# Patient Record
Sex: FEMALE | Race: WHITE | Employment: OTHER | URBAN - METROPOLITAN AREA
[De-identification: names, ages, dates, MRNs, and addresses within clinical notes are randomized per-mention and may not be internally consistent; named-entity substitution may affect disease eponyms.]

---

## 2019-10-15 ENCOUNTER — TELEPHONE (OUTPATIENT)
Dept: PULMONOLOGY | Age: 84
End: 2019-10-15

## 2019-10-17 ENCOUNTER — OFFICE VISIT (OUTPATIENT)
Dept: PULMONOLOGY | Age: 84
End: 2019-10-17
Payer: MEDICARE

## 2019-10-17 VITALS
OXYGEN SATURATION: 97 % | HEART RATE: 68 BPM | HEIGHT: 66 IN | DIASTOLIC BLOOD PRESSURE: 78 MMHG | WEIGHT: 173.5 LBS | SYSTOLIC BLOOD PRESSURE: 136 MMHG | BODY MASS INDEX: 27.88 KG/M2

## 2019-10-17 DIAGNOSIS — D68.9 COAGULATION DEFECT (HCC): ICD-10-CM

## 2019-10-17 DIAGNOSIS — R91.8 LUNG MASS: Primary | ICD-10-CM

## 2019-10-17 PROCEDURE — G8427 DOCREV CUR MEDS BY ELIG CLIN: HCPCS | Performed by: INTERNAL MEDICINE

## 2019-10-17 PROCEDURE — G8400 PT W/DXA NO RESULTS DOC: HCPCS | Performed by: INTERNAL MEDICINE

## 2019-10-17 PROCEDURE — 4040F PNEUMOC VAC/ADMIN/RCVD: CPT | Performed by: INTERNAL MEDICINE

## 2019-10-17 PROCEDURE — G8598 ASA/ANTIPLAT THER USED: HCPCS | Performed by: INTERNAL MEDICINE

## 2019-10-17 PROCEDURE — 1036F TOBACCO NON-USER: CPT | Performed by: INTERNAL MEDICINE

## 2019-10-17 PROCEDURE — G8484 FLU IMMUNIZE NO ADMIN: HCPCS | Performed by: INTERNAL MEDICINE

## 2019-10-17 PROCEDURE — 1123F ACP DISCUSS/DSCN MKR DOCD: CPT | Performed by: INTERNAL MEDICINE

## 2019-10-17 PROCEDURE — 99204 OFFICE O/P NEW MOD 45 MIN: CPT | Performed by: INTERNAL MEDICINE

## 2019-10-17 PROCEDURE — G8417 CALC BMI ABV UP PARAM F/U: HCPCS | Performed by: INTERNAL MEDICINE

## 2019-10-17 PROCEDURE — 1090F PRES/ABSN URINE INCON ASSESS: CPT | Performed by: INTERNAL MEDICINE

## 2019-10-22 ENCOUNTER — TELEPHONE (OUTPATIENT)
Dept: PULMONOLOGY | Age: 84
End: 2019-10-22

## 2019-10-23 ENCOUNTER — HOSPITAL ENCOUNTER (OUTPATIENT)
Dept: GENERAL RADIOLOGY | Age: 84
Discharge: HOME | End: 2019-10-23
Payer: MEDICARE

## 2019-10-23 ENCOUNTER — HOSPITAL ENCOUNTER (OUTPATIENT)
Dept: CT IMAGING | Age: 84
Discharge: HOME | End: 2019-10-23
Payer: MEDICARE

## 2019-10-23 VITALS
SYSTOLIC BLOOD PRESSURE: 139 MMHG | DIASTOLIC BLOOD PRESSURE: 70 MMHG | OXYGEN SATURATION: 96 % | TEMPERATURE: 98.4 F | HEART RATE: 65 BPM | RESPIRATION RATE: 18 BRPM

## 2019-10-23 DIAGNOSIS — R91.8 LUNG MASS: ICD-10-CM

## 2019-10-23 LAB
APTT: 30 SEC (ref 26–36)
INR BLD: 0.99 (ref 0.86–1.14)
PLATELET # BLD: 230 K/UL (ref 135–450)
PROTHROMBIN TIME: 11.3 SEC (ref 9.8–13)

## 2019-10-23 PROCEDURE — 71045 X-RAY EXAM CHEST 1 VIEW: CPT

## 2019-10-23 PROCEDURE — 85049 AUTOMATED PLATELET COUNT: CPT

## 2019-10-23 PROCEDURE — 7100000011 HC PHASE II RECOVERY - ADDTL 15 MIN

## 2019-10-23 PROCEDURE — 36415 COLL VENOUS BLD VENIPUNCTURE: CPT

## 2019-10-23 PROCEDURE — 7100000010 HC PHASE II RECOVERY - FIRST 15 MIN

## 2019-10-23 PROCEDURE — 88342 IMHCHEM/IMCYTCHM 1ST ANTB: CPT

## 2019-10-23 PROCEDURE — 85730 THROMBOPLASTIN TIME PARTIAL: CPT

## 2019-10-23 PROCEDURE — 6360000002 HC RX W HCPCS: Performed by: RADIOLOGY

## 2019-10-23 PROCEDURE — 88305 TISSUE EXAM BY PATHOLOGIST: CPT

## 2019-10-23 PROCEDURE — 2709999900 CT NEEDLE BIOPSY LUNG PERCUTANEOUS

## 2019-10-23 PROCEDURE — 88341 IMHCHEM/IMCYTCHM EA ADD ANTB: CPT

## 2019-10-23 PROCEDURE — 85610 PROTHROMBIN TIME: CPT

## 2019-10-23 RX ORDER — MIDAZOLAM HYDROCHLORIDE 1 MG/ML
0.5 INJECTION INTRAMUSCULAR; INTRAVENOUS ONCE
Status: COMPLETED | OUTPATIENT
Start: 2019-10-23 | End: 2019-10-23

## 2019-10-23 RX ORDER — FENTANYL CITRATE 50 UG/ML
25 INJECTION, SOLUTION INTRAMUSCULAR; INTRAVENOUS ONCE
Status: COMPLETED | OUTPATIENT
Start: 2019-10-23 | End: 2019-10-23

## 2019-10-23 RX ADMIN — FENTANYL CITRATE 25 MCG: 50 INJECTION, SOLUTION INTRAMUSCULAR; INTRAVENOUS at 11:40

## 2019-10-23 RX ADMIN — MIDAZOLAM HYDROCHLORIDE 0.5 MG: 2 INJECTION, SOLUTION INTRAMUSCULAR; INTRAVENOUS at 11:40

## 2019-10-23 ASSESSMENT — PAIN SCALES - GENERAL
PAINLEVEL_OUTOF10: 0

## 2019-11-05 ENCOUNTER — OFFICE VISIT (OUTPATIENT)
Dept: PULMONOLOGY | Age: 84
End: 2019-11-05
Payer: MEDICARE

## 2019-11-05 VITALS
OXYGEN SATURATION: 97 % | HEART RATE: 63 BPM | WEIGHT: 172 LBS | DIASTOLIC BLOOD PRESSURE: 84 MMHG | SYSTOLIC BLOOD PRESSURE: 130 MMHG | RESPIRATION RATE: 16 BRPM | BODY MASS INDEX: 27.76 KG/M2

## 2019-11-05 DIAGNOSIS — C34.2 MALIGNANT NEOPLASM OF MIDDLE LOBE OF RIGHT LUNG (HCC): Primary | ICD-10-CM

## 2019-11-05 PROCEDURE — G8598 ASA/ANTIPLAT THER USED: HCPCS | Performed by: INTERNAL MEDICINE

## 2019-11-05 PROCEDURE — 99214 OFFICE O/P EST MOD 30 MIN: CPT | Performed by: INTERNAL MEDICINE

## 2019-11-05 PROCEDURE — G8427 DOCREV CUR MEDS BY ELIG CLIN: HCPCS | Performed by: INTERNAL MEDICINE

## 2019-11-05 PROCEDURE — 1036F TOBACCO NON-USER: CPT | Performed by: INTERNAL MEDICINE

## 2019-11-05 PROCEDURE — 4040F PNEUMOC VAC/ADMIN/RCVD: CPT | Performed by: INTERNAL MEDICINE

## 2019-11-05 PROCEDURE — 1123F ACP DISCUSS/DSCN MKR DOCD: CPT | Performed by: INTERNAL MEDICINE

## 2019-11-05 PROCEDURE — G8400 PT W/DXA NO RESULTS DOC: HCPCS | Performed by: INTERNAL MEDICINE

## 2019-11-05 PROCEDURE — G8417 CALC BMI ABV UP PARAM F/U: HCPCS | Performed by: INTERNAL MEDICINE

## 2019-11-05 PROCEDURE — 1090F PRES/ABSN URINE INCON ASSESS: CPT | Performed by: INTERNAL MEDICINE

## 2019-11-05 PROCEDURE — G8484 FLU IMMUNIZE NO ADMIN: HCPCS | Performed by: INTERNAL MEDICINE

## 2019-11-07 ENCOUNTER — TELEPHONE (OUTPATIENT)
Dept: PULMONOLOGY | Age: 84
End: 2019-11-07

## 2019-11-07 ENCOUNTER — TRANSCRIBE ORDERS (OUTPATIENT)
Dept: ADMINISTRATIVE | Facility: HOSPITAL | Age: 84
End: 2019-11-07

## 2019-11-07 DIAGNOSIS — C34.2 MALIGNANT NEOPLASM OF MIDDLE LOBE, BRONCHUS OR LUNG (HCC): Primary | ICD-10-CM

## 2019-11-18 ENCOUNTER — HOSPITAL ENCOUNTER (OUTPATIENT)
Dept: MRI IMAGING | Facility: HOSPITAL | Age: 84
Discharge: HOME | End: 2019-11-18

## 2019-11-18 ENCOUNTER — HOSPITAL ENCOUNTER (OUTPATIENT)
Dept: PULMONOLOGY | Facility: HOSPITAL | Age: 84
Discharge: HOME | End: 2019-11-18

## 2019-11-18 ENCOUNTER — HOSPITAL ENCOUNTER (OUTPATIENT)
Dept: PET IMAGING | Facility: HOSPITAL | Age: 84
Discharge: HOME | End: 2019-11-18

## 2019-11-18 ENCOUNTER — HOSPITAL ENCOUNTER (OUTPATIENT)
Dept: PET IMAGING | Facility: HOSPITAL | Age: 84
Discharge: HOME | End: 2019-11-18
Admitting: INTERNAL MEDICINE

## 2019-11-18 DIAGNOSIS — C34.2 MALIGNANT NEOPLASM OF MIDDLE LOBE, BRONCHUS OR LUNG (HCC): ICD-10-CM

## 2019-11-18 LAB — GLUCOSE BLDC GLUCOMTR-MCNC: 106 MG/DL (ref 70–130)

## 2019-11-18 PROCEDURE — 94729 DIFFUSING CAPACITY: CPT

## 2019-11-18 PROCEDURE — 94727 GAS DIL/WSHOT DETER LNG VOL: CPT | Performed by: INTERNAL MEDICINE

## 2019-11-18 PROCEDURE — 94729 DIFFUSING CAPACITY: CPT | Performed by: INTERNAL MEDICINE

## 2019-11-18 PROCEDURE — 94060 EVALUATION OF WHEEZING: CPT | Performed by: INTERNAL MEDICINE

## 2019-11-18 PROCEDURE — 94060 EVALUATION OF WHEEZING: CPT

## 2019-11-18 PROCEDURE — 0 FLUDEOXYGLUCOSE F18 SOLUTION: Performed by: INTERNAL MEDICINE

## 2019-11-18 PROCEDURE — A9552 F18 FDG: HCPCS | Performed by: INTERNAL MEDICINE

## 2019-11-18 PROCEDURE — 82962 GLUCOSE BLOOD TEST: CPT

## 2019-11-18 PROCEDURE — 94727 GAS DIL/WSHOT DETER LNG VOL: CPT

## 2019-11-18 PROCEDURE — 70551 MRI BRAIN STEM W/O DYE: CPT

## 2019-11-18 PROCEDURE — 78815 PET IMAGE W/CT SKULL-THIGH: CPT

## 2019-11-18 RX ADMIN — FLUDEOXYGLUCOSE F18 1 DOSE: 300 INJECTION INTRAVENOUS at 13:40

## 2019-11-19 DIAGNOSIS — C34.2 MALIGNANT NEOPLASM OF MIDDLE LOBE OF RIGHT LUNG (HCC): ICD-10-CM

## 2019-11-25 DIAGNOSIS — C34.2 MALIGNANT NEOPLASM OF MIDDLE LOBE OF RIGHT LUNG (HCC): ICD-10-CM

## 2019-12-01 PROBLEM — C34.2 PRIMARY CANCER OF RIGHT MIDDLE LOBE OF LUNG (HCC): Status: ACTIVE | Noted: 2019-12-01

## 2019-12-01 NOTE — PROGRESS NOTES
DATE OF CONSULTATION: 12/2/2019    REFERRING PHYSICIAN: Juvencio Piña MD    Dear Juvencio Campo MD  Thank you for asking for my medical advice on this patient. I saw her in the  [place] office on 12/2/2019    REASON FOR CONSULTATION: Right middle lobe adenocarcinoma     HISTORY OF PRESENT ILLNESS: The patient is a very pleasant 84 y.o.  female   who was in her usual state of health until October 2019.  Patient had chest x-ray done by her primary care provider since she did not have one long time given her history of smoking.  This revealed right middle lobe lung nodule this was followed by CT scan that confirmed 4.3 cm right middle lobe lung mass with patient has CT-guided biopsy done October 25, 2019 that revealed adenocarcinoma. MRI brain whole-body PET scan did not show any evidence of metastatic disease.  Patient was referred to me for further recommendations.    SUBJECTIVE: When I saw the patient today she is here with her son.  He is complaining of mild fatigue but she does have chronic osteoarthritis with degenerative disc disease.  She fell recently and fractured her left proximal humerus bone.  She was deemed to be a surgical candidate.  Denied any weight loss.  She quit smoking more than 30 years ago.    Review of Systems   Constitutional: Negative for activity change, appetite change, chills, fatigue, fever and unexpected weight change.   HENT: Negative for hearing loss, mouth sores, nosebleeds, sore throat and trouble swallowing.    Eyes: Negative for visual disturbance.   Respiratory: Negative for cough, chest tightness, shortness of breath and wheezing.    Cardiovascular: Negative for chest pain, palpitations and leg swelling.   Gastrointestinal: Negative for abdominal distention, abdominal pain, blood in stool, constipation, diarrhea, nausea, rectal pain and vomiting.   Endocrine: Negative for cold intolerance and heat intolerance.   Genitourinary: Negative for difficulty urinating,  "dysuria, frequency and urgency.   Musculoskeletal: Negative for arthralgias, back pain, gait problem, joint swelling and myalgias.   Skin: Negative for rash.   Neurological: Negative for dizziness, tremors, syncope, weakness, light-headedness, numbness and headaches.   Hematological: Negative for adenopathy. Does not bruise/bleed easily.   Psychiatric/Behavioral: Negative for confusion, sleep disturbance and suicidal ideas. The patient is not nervous/anxious.        History reviewed. No pertinent past medical history.    Social History     Socioeconomic History   • Marital status:      Spouse name: Not on file   • Number of children: Not on file   • Years of education: Not on file   • Highest education level: Not on file       History reviewed. No pertinent family history.    History reviewed. No pertinent surgical history.    No Known Allergies       Current Outpatient Medications:   •  atorvastatin (LIPITOR) 10 MG tablet, Take 10 mg by mouth., Disp: , Rfl:   •  citalopram (CeleXA) 20 MG tablet, Take 20 mg by mouth., Disp: , Rfl:   •  clopidogrel (PLAVIX) 75 MG tablet, Take 75 mg by mouth., Disp: , Rfl:   •  lisinopril (PRINIVIL,ZESTRIL) 2.5 MG tablet, Take 2.5 mg by mouth., Disp: , Rfl:   •  melatonin 5 MG tablet tablet, Take 5 mg by mouth At Night As Needed., Disp: , Rfl:   •  aspirin 81 MG tablet, Take 81 mg by mouth., Disp: , Rfl:   •  gabapentin (NEURONTIN) 100 MG capsule, Take 100 mg by mouth., Disp: , Rfl:   •  HM OMEGA-3-6-9 FATTY ACIDS capsule, Take 1,200 mg by mouth., Disp: , Rfl:   •  RABEprazole (ACIPHEX) 20 MG EC tablet, Take 20 mg by mouth., Disp: , Rfl:     PHYSICAL EXAMINATION:   /77   Pulse 66   Temp 97.7 °F (36.5 °C) (Temporal)   Resp 12   Ht 167.6 cm (66\")   Wt 80.3 kg (177 lb)   SpO2 98%   BMI 28.57 kg/m²    ECOG Performance Status: 1 - Symptomatic but completely ambulatory  General Appearance:  alert, cooperative, no apparent distress and appears stated age "   Neurologic/Psychiatric: A&O x 3, gait steady, appropriate affect, strength 5/5 in all muscle groups   HEENT:  Normocephalic, without obvious abnormality, mucous membranes moist   Neck: Supple, symmetrical, trachea midline, no adenopathy;  No thyromegaly, masses, or tenderness   Lungs:   Clear to auscultation bilaterally; respirations regular, even, and unlabored bilaterally   Heart:  Regular rate and rhythm, no murmurs appreciated   Abdomen:   Soft, non-tender, non-distended and no organomegaly   Lymph nodes: No cervical, supraclavicular, inguinal or axillary adenopathy noted   Extremities: Normal, atraumatic; no clubbing, cyanosis, or edema    Skin: No rashes, ulcers, or suspicious lesions noted       No visits with results within 2 Week(s) from this visit.   Latest known visit with results is:   Hospital Outpatient Visit on 11/18/2019   Component Date Value Ref Range Status   • Glucose 11/18/2019 106  70 - 130 mg/dL Final        Mri Brain Without Contrast    Result Date: 11/18/2019  Narrative: EXAMINATION: MRI BRAIN WO CONTRAST-  INDICATION: C34.2-Malignant neoplasm of middle lobe, bronchus or lung.  TECHNIQUE: Multiplanar MRI of the brain without intravenous contrast.  COMPARISON: None.  FINDINGS: No restriction on diffusion-weighted sequences. Midline structures are symmetric without evidence of mass, mass effect or midline shift, however, advanced overall moderate to severe increased signal findings on T2 and FLAIR imaging within the supratentorial white matter consistent with chronic small vessel ischemic disease. Pituitary and sella within normal limits. Cervicomedullary junction is widely patent. Globes and orbits retain normal T2 signal characteristics. Visualized paranasal sinuses and mastoid air cells are grossly clear and well pneumatized. No cerebellopontine angle mass lesion. Normal signal flow voids of the distal internal carotid and basilar arteries.      Impression: No acute intracranial  abnormality specifically no evidence for acute infarction with advanced chronic small vessel ischemic disease findings of increased signal in the periventricular and deep white matter of the supratentorial structures. Limited evaluation for intracranial metastasis given lack of intravenous contrast.   D:  11/18/2019 E:  11/18/2019  This report was finalized on 11/18/2019 4:30 PM by Dr. Stevie Beck.      Nm Pet Skull Base To Mid Thigh    Result Date: 11/18/2019  Narrative: EXAMINATION: NM PET, SKULL BASE TO MID THIGH-11/18/2019:  INDICATION: C34.2  MAL NEOPLASM RIGHT LUNG; C34.2-Malignant neoplasm of middle lobe, bronchus or lung, right lung cancer.  TECHNIQUE: With fasting blood glucose level of 106 mg/dL, a total of 12.36 mCi of FDG was administered via the right wrist. Following appropriate delay PET/CT imaging was obtained from the skull vertex to the mid thighs bilaterally and fused multiplanar images were reconstructed. The CT scan is an unenhanced study and used for reference to the PET scan only and should not be considered a diagnostic CT scan. PET/CT imaging was reviewed in the axial, coronal, and sagittal planes as well as within the cine mode.  The radiation dose reduction device was turned on as low as reasonably achievable for each scan per ALARA protocol.  COMPARISON:  Initial exam for treatment. No prior study is available for comparison.  FINDINGS: Normal variant activity identified within the oropharynx and muscles of phonation. Normal variant activity identified in the region of the vocal cords. Slight asymmetry seen with uptake of tracer identified within the right thyroid lobe. Several small calcifications correlating to this area of uptake of tracer. Correlation to ultrasound can be performed as indicated. Within the chest there is fracture seen of the humeral head and neck on the right with uptake of tracer identified to suggest fracture. There is a focus of hypermetabolic activity correlating  to a soft tissue mass seen within the right lower lobe. This mass has maximum SUV of 9.0 and is consistent with malignancy. No additional hypermetabolic activity identified within the chest.  The abdomen and pelvis reveals normal variant activity seen within the GI and  tract. No hypermetabolic activity identified to suggest evidence of metastatic disease. Some inflammatory change is identified within the bursa of the greater trochanter of the right femur.      Impression: Single focus of hypermetabolic activity correlating to malignancy within the right mid lung. No additional hypermetabolic focus to suggest evidence of metastatic disease. There is hypermetabolic activity correlating to the shoulder fracture on the left. Inflammatory changes seen within the greater trochanter of the right femur.  D:  11/18/2019 E:  11/18/2019     This report was finalized on 11/18/2019 3:48 PM by Dr. Jessica Melendez MD.          DIAGNOSTIC DATA:   1. Radiology: As above   2. Dr. Piña's note reviewed by me and documented in the  chart.   3. Pathology report: CT guided biopsy 10/25/19 showed adenocarcinooma  4. Laboratory data: Reviewed by me and documented in the patient's chart     ASSESSMENT: The patient is a very pleasant 84 y.o.  female  with right middle lobe lung cancer    PROBLEM LIST:   1. Right middle lobe lung adenocarcinoma, [id number] stage IIA:  A. Presented with abnormal chest x-ray  B. Ct chest showed 4.3 cm right middle lobe lung cancer.  C. Ct guided biopsy done 10/25/19 showed moderately differentiated adenocarcinoma  D.  MRI brain whole-body PET scan done on November 2019 did not show any evidence of metastatic disease  2.  Coronary artery disease  3.  Hypertension  4.  Peripheral neuropathy    PLAN:   1. I had a long discussion today with the patient and her son about her  new diagnosis of lung cancer. I did go over the final pathology report in  detail with both of them. I reviewed the patient's documents  including refereing provider's notes, lab results, imaging, and path report.   2.  I reviewed the patient PET scan films myself and I went over the pictures with the patient and her son.  3.  Given the patient age multiple comorbidities and not being able to tolerate left humerus fracture repair I really do not think she will be a good candidate for right thoracotomy.  I think patient should be treated with CyberKnife.  I explained to the patient that CyberKnife is acceptable alternative to surgery.  I do think patient need to have fiducials given the size of the mass.  4.  I will discuss the case at tumor board tomorrow morning.  5.  I will send molecular testing.  6.  The patient follow-up with me in 4 months with repeat whole-body PET scan.  7.  Future treatment options include immunotherapy or targeted treatment.  Andrzej Mirza MD  12/2/2019

## 2019-12-02 ENCOUNTER — TELEPHONE (OUTPATIENT)
Dept: ONCOLOGY | Facility: CLINIC | Age: 84
End: 2019-12-02

## 2019-12-02 ENCOUNTER — CONSULT (OUTPATIENT)
Dept: ONCOLOGY | Facility: CLINIC | Age: 84
End: 2019-12-02

## 2019-12-02 ENCOUNTER — OFFICE VISIT (OUTPATIENT)
Dept: ORTHOPEDIC SURGERY | Facility: CLINIC | Age: 84
End: 2019-12-02

## 2019-12-02 VITALS — WEIGHT: 173 LBS | OXYGEN SATURATION: 98 % | BODY MASS INDEX: 27.8 KG/M2 | HEART RATE: 72 BPM | HEIGHT: 66 IN

## 2019-12-02 VITALS
SYSTOLIC BLOOD PRESSURE: 157 MMHG | HEIGHT: 66 IN | DIASTOLIC BLOOD PRESSURE: 77 MMHG | WEIGHT: 177 LBS | BODY MASS INDEX: 28.45 KG/M2 | RESPIRATION RATE: 12 BRPM | TEMPERATURE: 97.7 F | HEART RATE: 66 BPM | OXYGEN SATURATION: 98 %

## 2019-12-02 DIAGNOSIS — C34.2 PRIMARY CANCER OF RIGHT MIDDLE LOBE OF LUNG (HCC): Primary | ICD-10-CM

## 2019-12-02 DIAGNOSIS — S42.292A OTHER CLOSED DISPLACED FRACTURE OF PROXIMAL END OF LEFT HUMERUS, INITIAL ENCOUNTER: Primary | ICD-10-CM

## 2019-12-02 PROCEDURE — 99204 OFFICE O/P NEW MOD 45 MIN: CPT | Performed by: INTERNAL MEDICINE

## 2019-12-02 PROCEDURE — 99203 OFFICE O/P NEW LOW 30 MIN: CPT | Performed by: ORTHOPAEDIC SURGERY

## 2019-12-02 RX ORDER — CHOLECALCIFEROL (VITAMIN D3) 50 MCG
1200 CAPSULE ORAL
COMMUNITY

## 2019-12-02 RX ORDER — CITALOPRAM 20 MG/1
20 TABLET ORAL
COMMUNITY
Start: 2017-07-27

## 2019-12-02 RX ORDER — LISINOPRIL 2.5 MG/1
2.5 TABLET ORAL
COMMUNITY
Start: 2017-06-12 | End: 2021-01-01

## 2019-12-02 RX ORDER — RABEPRAZOLE SODIUM 20 MG/1
20 TABLET, DELAYED RELEASE ORAL
COMMUNITY

## 2019-12-02 RX ORDER — CHOLECALCIFEROL (VITAMIN D3) 125 MCG
5 CAPSULE ORAL NIGHTLY PRN
COMMUNITY

## 2019-12-02 RX ORDER — ATORVASTATIN CALCIUM 10 MG/1
10 TABLET, FILM COATED ORAL
COMMUNITY
Start: 2015-09-24

## 2019-12-02 RX ORDER — GABAPENTIN 100 MG/1
100 CAPSULE ORAL
COMMUNITY
End: 2021-01-01

## 2019-12-02 RX ORDER — CLOPIDOGREL BISULFATE 75 MG/1
75 TABLET ORAL
COMMUNITY
Start: 2015-09-24 | End: 2022-01-01 | Stop reason: ALTCHOICE

## 2019-12-02 NOTE — PROGRESS NOTES
[facility] Note    Subjective     Chief Complaint   Patient presents with   • Left Shoulder - Pain     6 weeks ago, patient missed a step, falling and landed on left shoulder.         ADITHYA Alvarez is a 84 y.o. female.  Patient presents today for evaluation of left shoulder pain.  She injured the shoulder about 6 weeks ago, when she fell resulting in a proximal humerus fracture.  Fracture was a valgus impacted fracture, and was treated conservatively.  She has mild pain today, which is dull in quality, and 2 out of 10, and she has been in a sling.      Patient Active Problem List   Diagnosis   • Primary cancer of right middle lobe of lung (CMS/HCC)     No past medical history on file.   No past surgical history on file.   No family history on file.  Social History     Socioeconomic History   • Marital status:      Spouse name: Not on file   • Number of children: Not on file   • Years of education: Not on file   • Highest education level: Not on file      Current Outpatient Medications on File Prior to Visit   Medication Sig Dispense Refill   • aspirin 81 MG tablet Take 81 mg by mouth.     • atorvastatin (LIPITOR) 10 MG tablet Take 10 mg by mouth.     • citalopram (CeleXA) 20 MG tablet Take 20 mg by mouth.     • clopidogrel (PLAVIX) 75 MG tablet Take 75 mg by mouth.     • gabapentin (NEURONTIN) 100 MG capsule Take 100 mg by mouth.     • HM OMEGA-3-6-9 FATTY ACIDS capsule Take 1,200 mg by mouth.     • lisinopril (PRINIVIL,ZESTRIL) 2.5 MG tablet Take 2.5 mg by mouth.     • melatonin 5 MG tablet tablet Take 5 mg by mouth At Night As Needed.     • RABEprazole (ACIPHEX) 20 MG EC tablet Take 20 mg by mouth.       No current facility-administered medications on file prior to visit.       No Known Allergies     Review of Systems   Constitutional: Negative for activity change, appetite change, chills, diaphoresis, fatigue, fever and unexpected weight change.   HENT: Negative for  "congestion, dental problem, drooling, ear discharge, ear pain, facial swelling, hearing loss, mouth sores, nosebleeds, postnasal drip, rhinorrhea, sinus pressure, sneezing, sore throat, tinnitus, trouble swallowing and voice change.    Eyes: Negative for photophobia, pain, discharge, redness, itching and visual disturbance.   Respiratory: Negative for apnea, cough, choking, chest tightness, shortness of breath, wheezing and stridor.    Cardiovascular: Positive for leg swelling. Negative for chest pain and palpitations.   Gastrointestinal: Negative for abdominal distention, abdominal pain, anal bleeding, blood in stool, constipation, diarrhea, nausea, rectal pain and vomiting.   Endocrine: Negative for cold intolerance, heat intolerance, polydipsia, polyphagia and polyuria.   Genitourinary: Positive for frequency. Negative for decreased urine volume, difficulty urinating, dysuria, enuresis, flank pain, genital sores, hematuria and urgency.   Musculoskeletal: Positive for arthralgias. Negative for back pain, gait problem, joint swelling, myalgias, neck pain and neck stiffness.   Skin: Negative for color change, pallor, rash and wound.   Allergic/Immunologic: Negative for environmental allergies, food allergies and immunocompromised state.   Neurological: Positive for dizziness and headaches. Negative for tremors, seizures, syncope, facial asymmetry, speech difficulty, weakness, light-headedness and numbness.   Hematological: Negative for adenopathy. Does not bruise/bleed easily.   Psychiatric/Behavioral: Negative for agitation, behavioral problems, confusion, decreased concentration, dysphoric mood, hallucinations, self-injury, sleep disturbance and suicidal ideas. The patient is not nervous/anxious and is not hyperactive.         Objective      Physical Exam  Pulse 72   Ht 167.5 cm (65.95\")   Wt 78.5 kg (173 lb)   SpO2 98%   Breastfeeding? No   BMI 27.97 kg/m²     Body mass index is 27.97 " kg/m².    General:   Mental Status:  Alert   Appearance: Cooperative, in no acute distress   Build and Nutrition: Well-nourished well-developed female   Orientation: Alert and oriented to person, place and time   Posture: Normal    Neurologic:   Sensation:    Left hand: Intact to light touch in the digits   Motor:  Left upper extremity: Intact deltoid, biceps, triceps, wrist flexors, wrist extensors, and interossei  Vascular:   Left upper extremity: 2+ radial pulse, prompt capillary refill    Upper Extremities:   Left Shoulder:    Tenderness:  None    Swelling:  None    Crepitus: None    Atrophy:  None    Range of motion:  External rotation:  30°       Forward flexion:  80°       Abduction:   70°  Instability:  None  Deformities:  None      Imaging/Studies  Imaging Results (Last 24 Hours)     Procedure Component Value Units Date/Time    XR Shoulder 2+ View Left [[id number]] Resulted:  12/02/19 1420     Updated:  12/02/19 1421    Narrative:       Left Shoulder Radiographs  Indication: Left proximal humerus fracture, follow-up  Views: AP, outlet and axillary views of the left shoulder    Comparison: None available    Findings:  Healing proximal humerus fracture in acceptable alignment, with impaction,   and no evidence of joint dislocation.              Assessment and Plan     Antonette was seen today for pain.    Diagnoses and all orders for this visit:    Other closed displaced fracture of proximal end of left humerus, initial encounter  -     XR Shoulder 2+ View Left  -     Ambulatory Referral to Physical Therapy Evaluate and treat, Ortho; ROM        1. Other closed displaced fracture of proximal end of left humerus, initial encounter        I reviewed my findings with the patient and her son who accompanies her today.  Fracture appears to be healing, and is in acceptable position given her functional demands.  At this point, recommended physical therapy, and a referral was provided.  I will see her back in 6 weeks  with a repeat x-ray to ensure continued healing, but sooner for any problems.  Anticipated functional limitations were discussed today.    Return in about 6 weeks (around 1/13/2020) for Recheck with X-Rays.      Medical Decision Making  Management Options : physical/occupational therapy and close treatment of fracture or dislocation  Data/Risk: radiology tests and independent visualization of imaging, lab tests, or EMG/NCV      Franco Mujica MD  12/02/19  4:32 PM

## 2019-12-02 NOTE — TELEPHONE ENCOUNTER
----- Message from Georgielaura Kolb sent at 12/2/2019 12:21 PM EST -----  Regarding: [facility]  Contact: [phone]  [place] with [facility] called and patient's insurance will not cover the testing for this pathology so now it would be up to the patient to get other insurance or pay out of pocket. If you need to reach him back with any questions or from where to go from here number is above.

## 2019-12-02 NOTE — TELEPHONE ENCOUNTER
VM left for patient's son, Doron, advising that dr gallego can send a blood test (Tempus) that should be covered by insurance since the next gen sequencing was not performed by pathology with Dr Piña  To return call for more explanation if willing to perform this test

## 2019-12-04 ENCOUNTER — OFFICE VISIT (OUTPATIENT)
Dept: RADIATION ONCOLOGY | Facility: HOSPITAL | Age: 84
End: 2019-12-04

## 2019-12-04 ENCOUNTER — HOSPITAL ENCOUNTER (OUTPATIENT)
Dept: RADIATION ONCOLOGY | Facility: HOSPITAL | Age: 84
Setting detail: RADIATION/ONCOLOGY SERIES
Discharge: HOME | End: 2019-12-04

## 2019-12-04 VITALS
DIASTOLIC BLOOD PRESSURE: 75 MMHG | WEIGHT: 176.1 LBS | BODY MASS INDEX: 28.3 KG/M2 | TEMPERATURE: 98.4 F | OXYGEN SATURATION: 95 % | SYSTOLIC BLOOD PRESSURE: 157 MMHG | HEIGHT: 66 IN | HEART RATE: 55 BPM | RESPIRATION RATE: 16 BRPM

## 2019-12-04 DIAGNOSIS — C34.2 PRIMARY CANCER OF RIGHT MIDDLE LOBE OF LUNG (HCC): Primary | ICD-10-CM

## 2019-12-04 PROCEDURE — G0463 HOSPITAL OUTPT CLINIC VISIT: HCPCS

## 2019-12-04 NOTE — PROGRESS NOTES
CONSULTATION NOTE      :                                                          1935  DATE OF CONSULTATION:                       2019   REQUESTING PHYSICIAN:                   Andrzej Mirza MD  REASON FOR CONSULTATION:           Primary cancer of right middle lobe of lung (CMS/HCC)  Staging form: Lung, AJCC 8th Edition  - Clinical: Stage IIA (cT2b, cN0, cM0) - Signed by Andrzej Mirza MD on 2019         BRIEF HISTORY:  The patient is a very pleasant 84 y.o. female  with recently diagnosed lung cancer.  She presented with cough secondary to bronchitis.  She has a remote history of cigarette smoking discontinued 30 years ago.  She had no change in breathing otherwise and does not use supplemental oxygen.  No hemoptysis.  No reported weight loss or chest pain.  Radiographic imaging, however, revealed a solitary noncalcified spiculated 4.3 cm right middle lobe lung tumor.  CT-guided biopsy performed 10/23/2019 showed moderately differentiated adenocarcinoma.  Nuclear medicine PET/CT performed 2019 showed to the right lung tumor to be metabolically active, SUV 9, consistent with malignancy.  There was no hypermetabolic activity identified elsewhere in the chest.  There was some reactive/inflammatory change in the area of recent left humerus fracture.  MRI of the brain showed no evidence of metastatic disease.  She is not an operative candidate.  She was referred for definitive treatment with special consideration for stereotactic body radiotherapy.    No Known Allergies    Social History     Socioeconomic History   • Marital status:      Spouse name: Not on file   • Number of children: Not on file   • Years of education: Not on file   • Highest education level: Not on file   Tobacco Use   • Smoking status: Former Smoker     Last attempt to quit: 1980     Years since quittin.9   • Smokeless tobacco: Never Used   Substance and Sexual Activity   • Alcohol use: No     Frequency: Never  "  • Drug use: No   • Sexual activity: Defer       Past Medical History:   Diagnosis Date   • H/O multiple myeloma    • Lung cancer (CMS/HCC)    • Stroke (CMS/HCC) 2014       family history includes Cancer in her father; Melanoma in her brother; Stroke in her mother.     Past Surgical History:   Procedure Laterality Date   • LUNG BIOPSY     • MULTIPLE TOOTH EXTRACTIONS          Review of Systems   Constitutional: Positive for fatigue.   Eyes: Negative.    Respiratory: Positive for wheezing.    Cardiovascular: Positive for leg swelling.   Gastrointestinal: Negative.    Endocrine: Negative.    Genitourinary: Positive for bladder incontinence.    Musculoskeletal: Positive for arthralgias and back pain.   Skin: Negative.    Neurological: Positive for headaches.   Hematological: Bruises/bleeds easily.   Psychiatric/Behavioral: Negative.            Objective   VITAL SIGNS:   Vitals:    12/04/19 1508   BP: 157/75   Pulse: 55   Resp: 16   Temp: 98.4 °F (36.9 °C)   TempSrc: Temporal   SpO2: 95%  Comment: RA   Weight: 79.9 kg (176 lb 1.6 oz)   Height: 167.6 cm (66\")   PainSc:   4   PainLoc: Leg  Comment: left leg        Karnofsky score: 70        Physical Exam   Constitutional: She is oriented to person, place, and time. She appears well-developed and well-nourished.   HENT:   Head: Normocephalic and atraumatic.   Neck: Normal range of motion. Neck supple.   Cardiovascular: Normal rate, regular rhythm and normal heart sounds.   No murmur heard.  Pulmonary/Chest: Effort normal and breath sounds normal. She has no wheezes. She has no rales.   Abdominal: Soft. Bowel sounds are normal. She exhibits no distension. There is no hepatosplenomegaly. There is no tenderness.   Musculoskeletal: Normal range of motion. She exhibits edema ( 1+ right lower leg and 2+ left lower leg with some erythema.  No calf tenderness.) and deformity ( Left shoulder and right wrist.). She exhibits no tenderness.   Lymphadenopathy:     She has no cervical " adenopathy.     She has no axillary adenopathy.        Right: No supraclavicular adenopathy present.        Left: No supraclavicular adenopathy present.   Neurological: She is alert and oriented to person, place, and time. She has normal strength. No sensory deficit.   Skin: Skin is warm and dry.   Psychiatric: She has a normal mood and affect. Her behavior is normal. Judgment and thought content normal.   Nursing note and vitals reviewed.           The following portions of the patient's history were reviewed and updated as appropriate: allergies, current medications, past family history, past medical history, past social history, past surgical history and problem list.    Assessment      Non-small cell carcinoma, adenocarcinoma of the right middle lobe lung, clinical stage IIA (T2b, N0, M0).  She is medically inoperable but still a good candidate for definitive treatment.  Stereotactic body radiotherapy would offer an excellent chance of local tumor control with significantly reduced treatment related morbidity.  The CyberKnife treatment procedures been reviewed in detail with patient and son today.  Informed consent obtained.    RECOMMENDATIONS: She will return later this week for CT simulation.  The right middle lobe lung tumor will receive 4 fractions of 12 Gray each on the CyberKnife.  I do not believe she will require placement of gold seed fiducials.    Return in about 2 days (around 12/6/2019) for Simulation.  Antonette was seen today for lung cancer.    Diagnoses and all orders for this visit:    Primary cancer of right middle lobe of lung (CMS/HCC)         Juan A Briseno MD

## 2019-12-06 ENCOUNTER — HOSPITAL ENCOUNTER (OUTPATIENT)
Dept: RADIATION ONCOLOGY | Facility: HOSPITAL | Age: 84
Discharge: HOME | End: 2019-12-06

## 2019-12-06 ENCOUNTER — APPOINTMENT (OUTPATIENT)
Dept: LAB | Facility: HOSPITAL | Age: 84
End: 2019-12-06

## 2019-12-06 DIAGNOSIS — C34.2 PRIMARY CANCER OF RIGHT MIDDLE LOBE OF LUNG (HCC): Primary | ICD-10-CM

## 2019-12-06 PROCEDURE — 77280 THER RAD SIMULAJ FIELD SMPL: CPT | Performed by: RADIOLOGY

## 2019-12-10 ENCOUNTER — TREATMENT (OUTPATIENT)
Dept: PHYSICAL THERAPY | Facility: CLINIC | Age: 84
End: 2019-12-10

## 2019-12-10 ENCOUNTER — TELEPHONE (OUTPATIENT)
Dept: ONCOLOGY | Facility: CLINIC | Age: 84
End: 2019-12-10

## 2019-12-10 DIAGNOSIS — M25.612 POST-TRAUMATIC STIFFNESS OF LEFT SHOULDER JOINT: ICD-10-CM

## 2019-12-10 DIAGNOSIS — M25.512 CHRONIC LEFT SHOULDER PAIN: ICD-10-CM

## 2019-12-10 DIAGNOSIS — G89.29 CHRONIC LEFT SHOULDER PAIN: ICD-10-CM

## 2019-12-10 DIAGNOSIS — S42.295S OTHER CLOSED NONDISPLACED FRACTURE OF PROXIMAL END OF LEFT HUMERUS, SEQUELA: Primary | ICD-10-CM

## 2019-12-10 PROCEDURE — 77295 3-D RADIOTHERAPY PLAN: CPT | Performed by: RADIOLOGY

## 2019-12-10 PROCEDURE — 97162 PT EVAL MOD COMPLEX 30 MIN: CPT | Performed by: PHYSICAL THERAPIST

## 2019-12-10 PROCEDURE — 77300 RADIATION THERAPY DOSE PLAN: CPT | Performed by: RADIOLOGY

## 2019-12-10 PROCEDURE — 77334 RADIATION TREATMENT AID(S): CPT | Performed by: RADIOLOGY

## 2019-12-10 PROCEDURE — 97110 THERAPEUTIC EXERCISES: CPT | Performed by: PHYSICAL THERAPIST

## 2019-12-10 NOTE — TELEPHONE ENCOUNTER
[facility] called to let us know that they do not have enough tissue remaining to do the PDL1HC and DNA Mismatch repair panel and they have already sent it off, so they won't be able to do it. They will, however, send over the report when they get it back.

## 2019-12-10 NOTE — PROGRESS NOTES
Physical Therapy Initial Evaluation and Plan of Care        Patient: Antonette Alvarez   : 1935  Diagnosis/ICD-10 Code:  Other closed nondisplaced fracture of proximal end of left humerus, sequela [S42.295S]  Referring practitioner: Franco Mujica MD  Date of Initial Visit: 12/10/2019  Today's Date: 12/10/2019  Patient seen for 1 sessions           Subjective Questionnaire: QuickDASH: 56.82      Subjective Evaluation    History of Present Illness  Date of onset: 10/19/2019  Mechanism of injury: Patient was stepping down from the kitchen to the family room when she fell directly onto the shoulder and resulted in a fx.  She was placed in a sling and seems to be healing routinely.  She presents today out of the sling completely.  She also fell about 4-6 weeks prior to that and fx'd the right wrist that was handled conservatively as well.    Quality of life: good    Pain  Current pain ratin  At best pain ratin  At worst pain ratin  Quality: dull ache and tight  Relieving factors: support  Aggravating factors: repetitive movement and lifting (gripping)  Progression: no change    Social Support  Lives in: multiple-level home  Lives with: adult children    Diagnostic Tests  X-ray: abnormal (healing fx)  MRI studies: normal    Treatments  Previous treatment: immobilization  Current treatment: medication  Current treatment comments: OTC tylenol.     Patient Goals  Patient goals for therapy: decreased pain, increased motion and increased strength             Objective       Postural Observations    Additional Postural Observation Details  Fwd head, mild slumped sitting posture.    Observations     Additional Observation Details  atrophy around the left deltoid, no sling, generalized unwillingness to move the LUE.      Active Range of Motion   Left Shoulder   Flexion: 75 degrees   Abduction: 75 degrees   External rotation BTH: C4   Internal rotation BTB: L2     Right Shoulder   External rotation BTH: C7    Internal rotation BTB: sacrum (psoterolateral hip)     Passive Range of Motion   Left Shoulder   Flexion: 160 degrees   Abduction: 150 degrees   External rotation 0°: 35 degrees   Internal rotation 0°: WFL    Joint Play   Left Shoulder  Hypomobile in the anterior capsule, posterior capsule and inferior capsule.    Strength/Myotome Testing     Left Shoulder     Planes of Motion   Flexion: 2+   Abduction: 2+   External rotation at 0°: 3   Internal rotation at 0°: 4+     Isolated Muscles   Biceps: 5   Triceps: 5     Right Shoulder     Planes of Motion   Flexion: 4+   Abduction: 4   External rotation at 0°: 4+   Internal rotation at 0°: 4+     Isolated Muscles   Biceps: 5   Triceps: 5     Additional Strength Details  : Left 25, 25, 23  Right 25,20,22.  Position two.         Assessment & Plan     Assessment  Impairments: abnormal gait, abnormal or restricted ROM, activity intolerance, impaired balance, impaired physical strength, lacks appropriate home exercise program, pain with function and safety issue  Assessment details: Patient presents s/p left shoulder humeral fx but she is progressing well at this point, she demonstrate nearly 90 degrees of elevation with weakness in her RTC as expected.  She is undergoing concurrent cancer treatment for lung cancer and has a history of CVA.  She has recently moved in with her son and his family and has spent time less independent since the injury.  She also has a right wrist fx from a fall 4-6 weeks prior to the humeral fx, but she doesn't have an order for this currently.  This will likely improve with her exercises and time overall, but she may need further treatment for this as well with less improvements.    Prognosis: fair  Prognosis details: Barriers include advanced age, concurrent CA treatments, CVA history.  Functional Limitations: carrying objects, lifting, pulling, pushing, uncomfortable because of pain, moving in bed, reaching behind back and reaching  overhead  Goals  Plan Goals: Short Term Goals (4wks)  1. Patient to report right shoulder pain less than or equal to 1/10.  2. Patient to demonstrate at least 110 degrees of AROM left shoulder flexion  3. Patient will be independent and compliant with initial home exercise program.   4. Pt will report resting pain 0-1/10, 2-3/10 with PROM.  5. Pt will demonstrate 10-30° improvements in PFE, PER, PIR, and PABD.    Long Term Goals (6-8wks)  1. Patient to demonstrate at least 135 degrees of left shoulder flexion AROM.  2. Patient to demonstrate at least 135 degrees of left shoulder abduction AROM.  3. Patient to achieve left hand behind head to at least C7 without significant change in pain.  4. Pt will perform left  hand behind back to at least L3 to improve ability to tuck in shirt.  5. Pt. will be independent and compliant with advanced home exercise program to facilitate self-management of symptoms.  6. Patient will improve Quick Dash score to at least 30%.  7. Patient to resume independent cooking and cleaning tasks without limitation.      Plan  Therapy options: will be seen for skilled physical therapy services  Planned modality interventions: thermotherapy (hydrocollator packs) and cryotherapy  Planned therapy interventions: manual therapy, therapeutic activities, postural training, body mechanics training, functional ROM exercises, flexibility, gait training, stretching, strengthening, joint mobilization, neuromuscular re-education, soft tissue mobilization and spinal/joint mobilization  Frequency: 2x week  Duration in visits: 16  Treatment plan discussed with: patient  Plan details: Slow progression of ROM AA-A, scapular and shoulder stabilization, manual and modalities as indicated based on pain.        Timed:  Manual Therapy:    0     mins  49936;  Therapeutic Exercise:    10     mins  46148;     Neuromuscular Eduin:    0    mins  08163;    Therapeutic Activity:     0     mins  74386;     Gait Training:       0     mins  49852;     Ultrasound:     0     mins  94218;    Electrical Stimulation:    0     mins  07637 ( );    Untimed:  Electrical Stimulation:    0     mins  46910 ( );  Mechanical Traction:    0     mins  60265;     Timed Treatment:   10   mins   Total Treatment:     40   mins    PT SIGNATURE: Aaron Zarco, PT   DATE TREATMENT INITIATED: 12/10/2019    Medicare Initial Certification  Certification Period: 3/9/2020  I certify that the therapy services are furnished while this patient is under my care.  The services outlined above are required by this patient, and will be reviewed every 90 days.     PHYSICIAN: Franco Mujica MD      DATE:     Please sign and return via fax to [phone].. Thank you, [facility].

## 2019-12-10 NOTE — TELEPHONE ENCOUNTER
Spoke with [facility] pathology who states that they will have [employer] send back the block that was originally sent by the referring physician to have this testing done. It was not covered by patient's insurance, so the testing was never completed. They just received the request for tissue from our [facility] submission request, so they will have [employer] return the block so it can be submitted to [facility].

## 2019-12-10 NOTE — TELEPHONE ENCOUNTER
Update:  Insurance will not cover [facility] one testing.   [facility] never reached out to ask if she wants to be self-pay.     They want to make sure that we don't need anything else from [facility] then they will send it off to do PDL1HC and DNA Mismatch repair panel, and that's the only testing that will be done.     Please give them a call at

## 2019-12-17 ENCOUNTER — TREATMENT (OUTPATIENT)
Dept: PHYSICAL THERAPY | Facility: CLINIC | Age: 84
End: 2019-12-17

## 2019-12-17 DIAGNOSIS — M25.512 CHRONIC LEFT SHOULDER PAIN: ICD-10-CM

## 2019-12-17 DIAGNOSIS — M25.612 POST-TRAUMATIC STIFFNESS OF LEFT SHOULDER JOINT: ICD-10-CM

## 2019-12-17 DIAGNOSIS — G89.29 CHRONIC LEFT SHOULDER PAIN: ICD-10-CM

## 2019-12-17 DIAGNOSIS — S42.295S OTHER CLOSED NONDISPLACED FRACTURE OF PROXIMAL END OF LEFT HUMERUS, SEQUELA: Primary | ICD-10-CM

## 2019-12-17 PROCEDURE — 97110 THERAPEUTIC EXERCISES: CPT | Performed by: PHYSICAL THERAPIST

## 2019-12-17 PROCEDURE — A9300 EXERCISE EQUIPMENT: HCPCS | Performed by: PHYSICAL THERAPIST

## 2019-12-17 PROCEDURE — 97140 MANUAL THERAPY 1/> REGIONS: CPT | Performed by: PHYSICAL THERAPIST

## 2019-12-17 NOTE — PROGRESS NOTES
Physical Therapy Daily Progress Note        Patient: Antonette Alvarez   : 1935  Diagnosis/ICD-10 Code:  Other closed nondisplaced fracture of proximal end of left humerus, sequela [S42.295S]  Referring practitioner: Franco Mujica MD  Date of Initial Visit: Type: THERAPY  Noted: 12/10/2019  Today's Date: 2019  Patient seen for 2 sessions         Patient reports she has been working on her exercises and feels she is making improvements.  She is most limited with reaching behind her, but notices continued troubles lifting her pants after going to the bathroom. Her pain level is 3/10 upon arrival.      Objective   Passive ER arm at about 30 abd 45 with end range pain  Passive flexion to about 135.  Active flexion to 95    See Exercise, Manual, and Modality Logs for complete treatment.       Assessment & Plan     Assessment  Assessment details: Patient is improving well as expected and her ROM is well exceeding expectations at this point.  She is beginning cyberknife radiation treatments, which could impact her care potentially this week.  She is appropriate to begin light strengthening activities.    Plan  Plan details: Initiate light strengthening activities.            Timed:  Manual Therapy:    11     mins  94263;  Therapeutic Exercise:    20     mins  29363;     Neuromuscular Eduin:    0    mins  87732;    Therapeutic Activity:     0     mins  15939;     Gait Trainin     mins  60755;     Ultrasound:     0     mins  27149;    Electrical Stimulation:    0     mins  71670 ( );    Untimed:  Electrical Stimulation:    0     mins  34790 ( );  Mechanical Traction:    0     mins  96808;     Timed Treatment:   31   mins   Total Treatment:     31   mins  Aaron Zarco, PT  Physical Therapist

## 2019-12-18 ENCOUNTER — HOSPITAL ENCOUNTER (OUTPATIENT)
Dept: RADIATION ONCOLOGY | Facility: HOSPITAL | Age: 84
Discharge: HOME | End: 2019-12-18

## 2019-12-18 PROCEDURE — 77373 STRTCTC BDY RAD THER TX DLVR: CPT | Performed by: RADIOLOGY

## 2019-12-18 PROCEDURE — 77280 THER RAD SIMULAJ FIELD SMPL: CPT | Performed by: RADIOLOGY

## 2019-12-19 ENCOUNTER — HOSPITAL ENCOUNTER (OUTPATIENT)
Dept: RADIATION ONCOLOGY | Facility: HOSPITAL | Age: 84
Discharge: HOME | End: 2019-12-19

## 2019-12-19 PROCEDURE — 77373 STRTCTC BDY RAD THER TX DLVR: CPT | Performed by: RADIOLOGY

## 2019-12-20 ENCOUNTER — HOSPITAL ENCOUNTER (OUTPATIENT)
Dept: RADIATION ONCOLOGY | Facility: HOSPITAL | Age: 84
Discharge: HOME | End: 2019-12-20

## 2019-12-20 PROCEDURE — 77373 STRTCTC BDY RAD THER TX DLVR: CPT | Performed by: RADIOLOGY

## 2019-12-23 ENCOUNTER — HOSPITAL ENCOUNTER (OUTPATIENT)
Dept: RADIATION ONCOLOGY | Facility: HOSPITAL | Age: 84
Discharge: HOME | End: 2019-12-23

## 2019-12-23 PROCEDURE — 77336 RADIATION PHYSICS CONSULT: CPT | Performed by: RADIOLOGY

## 2019-12-23 PROCEDURE — 77373 STRTCTC BDY RAD THER TX DLVR: CPT | Performed by: RADIOLOGY

## 2019-12-27 ENCOUNTER — TREATMENT (OUTPATIENT)
Dept: PHYSICAL THERAPY | Facility: CLINIC | Age: 84
End: 2019-12-27

## 2019-12-27 DIAGNOSIS — S42.295S OTHER CLOSED NONDISPLACED FRACTURE OF PROXIMAL END OF LEFT HUMERUS, SEQUELA: Primary | ICD-10-CM

## 2019-12-27 DIAGNOSIS — M25.512 CHRONIC LEFT SHOULDER PAIN: ICD-10-CM

## 2019-12-27 DIAGNOSIS — M25.612 POST-TRAUMATIC STIFFNESS OF LEFT SHOULDER JOINT: ICD-10-CM

## 2019-12-27 DIAGNOSIS — G89.29 CHRONIC LEFT SHOULDER PAIN: ICD-10-CM

## 2019-12-27 PROCEDURE — 97110 THERAPEUTIC EXERCISES: CPT | Performed by: PHYSICAL THERAPIST

## 2019-12-27 PROCEDURE — 97140 MANUAL THERAPY 1/> REGIONS: CPT | Performed by: PHYSICAL THERAPIST

## 2019-12-27 NOTE — PROGRESS NOTES
Physical Therapy Daily Progress Note        Patient: Antonette Alvarez   : 1935  Diagnosis/ICD-10 Code:  Other closed nondisplaced fracture of proximal end of left humerus, sequela [S42.295S]  Referring practitioner: Franco Mujica MD  Date of Initial Visit: Type: THERAPY  Noted: 12/10/2019  Today's Date: 2019  Patient seen for 3 sessions         Patient reports that she has completed her radiation treatments and has had fatigue that has limited her participation in exercises a little at home.  She notes that her pain is along the superior shoulder and primarily with moving the arm.  She is, however, sleeping better on her right side. Her pain level is 2/10 upon arrival.          Objective   AROM elevation to 95 scapular plane.  PROM flex 135, abd 135, ER 55 at 45 ABD    See Exercise, Manual, and Modality Logs for complete treatment.       Assessment & Plan     Assessment  Assessment details: Patient demonstrate better than anticipated ROM since her surgery following humeral fx.  She is progressing through exercises and her pain is improving.  She is able to improve her comfort with sleeping.  She continues to have concurrent wrist pain that is unchanged.    Plan  Plan details: Add band RTC strengthening.              Timed:  Manual Therapy:    14     mins  07526;  Therapeutic Exercise:    17     mins  37338;     Neuromuscular Eduin:    0    mins  05746;    Therapeutic Activity:     0     mins  13546;     Gait Trainin     mins  11695;     Ultrasound:     0     mins  38224;    Electrical Stimulation:    0     mins  09675 ( );    Untimed:  Electrical Stimulation:    0     mins  24036 ( );  Mechanical Traction:    0     mins  24802;     Timed Treatment:   31   mins   Total Treatment:     31   mins  Aaron Zarco, PT  Physical Therapist

## 2020-01-07 ENCOUNTER — TREATMENT (OUTPATIENT)
Dept: PHYSICAL THERAPY | Facility: CLINIC | Age: 85
End: 2020-01-07

## 2020-01-07 DIAGNOSIS — M25.512 CHRONIC LEFT SHOULDER PAIN: ICD-10-CM

## 2020-01-07 DIAGNOSIS — M25.612 POST-TRAUMATIC STIFFNESS OF LEFT SHOULDER JOINT: ICD-10-CM

## 2020-01-07 DIAGNOSIS — S42.295S OTHER CLOSED NONDISPLACED FRACTURE OF PROXIMAL END OF LEFT HUMERUS, SEQUELA: Primary | ICD-10-CM

## 2020-01-07 DIAGNOSIS — G89.29 CHRONIC LEFT SHOULDER PAIN: ICD-10-CM

## 2020-01-07 PROCEDURE — 97110 THERAPEUTIC EXERCISES: CPT | Performed by: PHYSICAL THERAPIST

## 2020-01-07 NOTE — PROGRESS NOTES
Re-Assessment / Re-Certification        Patient: Antonette Alvarez   : 1935  Diagnosis/ICD-10 Code:  Other closed nondisplaced fracture of proximal end of left humerus, sequela [S42.295S]  Referring practitioner: Franco Mujica MD  Date of Initial Visit: Type: THERAPY  Noted: 12/10/2019  Today's Date: 2020  Patient seen for 4 sessions      Subjective:     Subjective Questionnaire: QuickDASH: 25%  Clinical Progress: improved  Home Program Compliance: Yes  Treatment has included: therapeutic exercise, manual therapy and therapeutic activity    Subjective Evaluation    History of Present Illness    Subjective comment: Patient notes a mild increase in her pain during exercise at times but it is temporary annd transient.     Objective       Postural Observations    Additional Postural Observation Details  Fwd head, mild slumped sitting posture.    Observations     Additional Observation Details  atrophy around the left deltoid, no sling    Active Range of Motion   Left Shoulder   Flexion: 122 (scapular plane) degrees with pain  External rotation BTH: C6   Internal rotation BTB: sacrum with pain    Right Shoulder   External rotation BTH: C7   Internal rotation BTB: L2     Passive Range of Motion   Left Shoulder   Flexion: 160 degrees   Abduction: 150 degrees   External rotation 0°: 50 degrees   Internal rotation 0°: WFL    Joint Play   Left Shoulder  Hypomobile in the anterior capsule, posterior capsule and inferior capsule.    Strength/Myotome Testing     Left Shoulder     Planes of Motion   Flexion: 2+   Abduction: 2+   External rotation at 0°: 3   Internal rotation at 0°: 4+     Isolated Muscles   Biceps: 5   Triceps: 5     Right Shoulder     Planes of Motion   Flexion: 4+   Abduction: 4   External rotation at 0°: 4+   Internal rotation at 0°: 4+     Isolated Muscles   Biceps: 5   Triceps: 5     Additional Strength Details  : Left 32,32,32 Right 25, 26, 25.  Position two     Assessment & Plan      Assessment  Impairments: abnormal gait, abnormal or restricted ROM, activity intolerance, impaired balance, impaired physical strength, lacks appropriate home exercise program, pain with function and safety issue  Assessment details: Reassessment performed in clinic today.  She has improved over 45 degrees in her elevation ability.  She is more comfortable with moving the arm but she has pain at end range elevation.  She still has significant RTC weakness as exepcted with her injury.  She still is having right wrist pain and  strength weakness from a second fracture. While she is improving and is exceeding expectations at this point, she will benefit from strengthening and stabilization at least through an HEP.  Prognosis: fair  Prognosis details: Barriers include advanced age, concurrent CA treatments, CVA history.  Functional Limitations: carrying objects, lifting, pulling, pushing, uncomfortable because of pain, moving in bed, reaching behind back and reaching overhead  Goals  Plan Goals: Short Term Goals (4wks)  1. Patient to report right shoulder pain less than or equal to 1/10.  2. Patient to demonstrate at least 110 degrees of AROM left shoulder flexion  3. Patient will be independent and compliant with initial home exercise program.   4. Pt will report resting pain 0-1/10, 2-3/10 with PROM.  5. Pt will demonstrate 10-30° improvements in PFE, PER, PIR, and PABD.    Long Term Goals (6-8wks)  1. Patient to demonstrate at least 135 degrees of left shoulder flexion AROM.  2. Patient to demonstrate at least 135 degrees of left shoulder abduction AROM.  3. Patient to achieve left hand behind head to at least C7 without significant change in pain.  4. Pt will perform left  hand behind back to at least L3 to improve ability to tuck in shirt.  5. Pt. will be independent and compliant with advanced home exercise program to facilitate self-management of symptoms.  6. Patient will improve Quick Dash score to at  least 30%.  7. Patient to resume independent cooking and cleaning tasks without limitation.      Plan  Therapy options: will be seen for skilled physical therapy services  Planned modality interventions: thermotherapy (hydrocollator packs) and cryotherapy  Planned therapy interventions: manual therapy, therapeutic activities, postural training, body mechanics training, functional ROM exercises, flexibility, gait training, stretching, strengthening, joint mobilization, neuromuscular re-education, soft tissue mobilization and spinal/joint mobilization  Frequency: 1x week  Duration in visits: 10  Treatment plan discussed with: patient  Plan details: Begin to progress shoulder strengthening and stabilization with scapular strengthening, manual and modalities as indicated.      Progress toward previous goals: Partially Met            Timeframe: 1 month  Prognosis to achieve goals: good    PT Signature: Aaron Zarco, PT      Based upon review of the patient's progress and continued therapy plan, it is my medical opinion that Antonette Alvarez should continue physical therapy treatment at [facility]  [address] [place] [id number] [phone].    Signature: __________________________________  Franco Mujica MD    Timed:  Manual Therapy:    0     mins  60968;  Therapeutic Exercise:    41     mins  84359;     Neuromuscular Eduin:    0    mins  50060;    Therapeutic Activity:     0     mins  67919;     Gait Trainin     mins  14243;     Ultrasound:     0     mins  35980;    Electrical Stimulation:    0     mins  45188 ( );    Untimed:  Electrical Stimulation:    0     mins  27876 ( );  Mechanical Traction:    0     mins  79122;     Timed Treatment:   41   mins   Total Treatment:     41   mins

## 2020-01-13 ENCOUNTER — OFFICE VISIT (OUTPATIENT)
Dept: ORTHOPEDIC SURGERY | Facility: CLINIC | Age: 85
End: 2020-01-13

## 2020-01-13 VITALS — OXYGEN SATURATION: 98 % | WEIGHT: 170 LBS | BODY MASS INDEX: 27.32 KG/M2 | HEIGHT: 66 IN | HEART RATE: 80 BPM

## 2020-01-13 DIAGNOSIS — S42.292D OTHER CLOSED DISPLACED FRACTURE OF PROXIMAL END OF LEFT HUMERUS WITH ROUTINE HEALING, SUBSEQUENT ENCOUNTER: ICD-10-CM

## 2020-01-13 DIAGNOSIS — Z09 FRACTURE FOLLOW-UP: Primary | ICD-10-CM

## 2020-01-13 PROCEDURE — 99212 OFFICE O/P EST SF 10 MIN: CPT | Performed by: ORTHOPAEDIC SURGERY

## 2020-01-13 NOTE — PROGRESS NOTES
[facility] [place] [place] [facility] Clinic Note    Subjective     Chief Complaint   Patient presents with   • Follow-up     6 week f/u; Other closed displaced fracture of proximal end of left humerus        ADITHYA Alvarez is a 84 y.o. female.  She follows up today for her left proximal humerus fracture.  She is doing well today, with only mild pain.  Improvement with physical therapy.      Patient Active Problem List   Diagnosis   • Primary cancer of right middle lobe of lung (CMS/HCC)     Past Medical History:   Diagnosis Date   • H/O multiple myeloma    • Lung cancer (CMS/HCC)    • Stroke (CMS/HCC)       Past Surgical History:   Procedure Laterality Date   • LUNG BIOPSY     • MULTIPLE TOOTH EXTRACTIONS        Family History   Problem Relation Age of Onset   • Stroke Mother    • Cancer Father    • Melanoma Brother      Social History     Socioeconomic History   • Marital status:      Spouse name: Not on file   • Number of children: Not on file   • Years of education: Not on file   • Highest education level: Not on file   Tobacco Use   • Smoking status: Former Smoker     Last attempt to quit: 1980     Years since quittin.0   • Smokeless tobacco: Never Used   Substance and Sexual Activity   • Alcohol use: No     Frequency: Never   • Drug use: No   • Sexual activity: Defer      Current Outpatient Medications on File Prior to Visit   Medication Sig Dispense Refill   • aspirin 81 MG tablet Take 81 mg by mouth.     • atorvastatin (LIPITOR) 10 MG tablet Take 10 mg by mouth.     • citalopram (CeleXA) 20 MG tablet Take 20 mg by mouth.     • clopidogrel (PLAVIX) 75 MG tablet Take 75 mg by mouth.     • gabapentin (NEURONTIN) 100 MG capsule Take 100 mg by mouth.     • HM OMEGA-3-6-9 FATTY ACIDS capsule Take 1,200 mg by mouth.     • lisinopril (PRINIVIL,ZESTRIL) 2.5 MG tablet Take 2.5 mg by mouth.     • melatonin 5 MG tablet tablet Take 5 mg by mouth At Night As Needed.     • RABEprazole (ACIPHEX) 20 MG EC  tablet Take 20 mg by mouth.       No current facility-administered medications on file prior to visit.       No Known Allergies     Review of Systems   Constitutional: Negative for activity change, appetite change, chills, diaphoresis, fatigue, fever and unexpected weight change.   HENT: Negative for congestion, dental problem, drooling, ear discharge, ear pain, facial swelling, hearing loss, mouth sores, nosebleeds, postnasal drip, rhinorrhea, sinus pressure, sneezing, sore throat, tinnitus, trouble swallowing and voice change.    Eyes: Negative for photophobia, pain, discharge, redness, itching and visual disturbance.   Respiratory: Negative for apnea, cough, choking, chest tightness, shortness of breath, wheezing and stridor.    Cardiovascular: Negative for chest pain, palpitations and leg swelling.   Gastrointestinal: Negative for abdominal distention, abdominal pain, anal bleeding, blood in stool, constipation, diarrhea, nausea, rectal pain and vomiting.   Endocrine: Negative for cold intolerance, heat intolerance, polydipsia, polyphagia and polyuria.   Genitourinary: Positive for frequency. Negative for decreased urine volume, difficulty urinating, dysuria, enuresis, flank pain, genital sores, hematuria and urgency.   Musculoskeletal: Positive for arthralgias. Negative for back pain, gait problem, joint swelling, myalgias, neck pain and neck stiffness.   Skin: Negative for color change, pallor, rash and wound.   Allergic/Immunologic: Negative for environmental allergies, food allergies and immunocompromised state.   Neurological: Positive for dizziness. Negative for tremors, seizures, syncope, facial asymmetry, speech difficulty, weakness, light-headedness, numbness and headaches.   Hematological: Negative for adenopathy. Does not bruise/bleed easily.   Psychiatric/Behavioral: Negative for agitation, behavioral problems, confusion, decreased concentration, dysphoric mood, hallucinations, self-injury, sleep  "disturbance and suicidal ideas. The patient is not nervous/anxious and is not hyperactive.         Objective      Physical Exam  Pulse 80   Ht 167.6 cm (65.98\")   Wt 77.1 kg (170 lb)   SpO2 98%   BMI 27.45 kg/m²     Body mass index is 27.45 kg/m².    General:   Mental Status:  Alert   Appearance: Cooperative, in no acute distress   Build and Nutrition: Well-nourished well-developed female   Orientation: Alert and oriented to person, place and time   Posture: Normal    Upper Extremities:   Left Shoulder:    Tenderness:  None    Swelling:  None    Range of motion:  External rotation:  40°       Forward flexion:  160°       Abduction:   150°  Deformities:  None      Imaging/Studies  Imaging Results (Last 24 Hours)     Procedure Component Value Units Date/Time    XR Shoulder 2+ View Left [[id number]] Resulted:  01/13/20 0931     Updated:  01/13/20 0932    Narrative:       Left Shoulder Radiographs  Indication: Follow-up left proximal humerus fracture  Views: AP, outlet and axillary views of the left shoulder    Comparison: 12/2/2019    Findings:  Fracture alignment is stable compared to the previous films, with good   signs of initial healing, with valgus impaction of the fracture, with no   unusual bony features.              Assessment and Plan     Antonette was seen today for follow-up.    Diagnoses and all orders for this visit:    Fracture follow-up  -     XR Shoulder 2+ View Left    Other closed displaced fracture of proximal end of left humerus with routine healing, subsequent encounter        1. Fracture follow-up    2. Other closed displaced fracture of proximal end of left humerus with routine healing, subsequent encounter        I reviewed my findings with the patient and her son.  Fracture is healed, and range of motion has improved.  She will finish out her physical therapy, and I will see her back if there are any problems in the future.  She seems to be functional with her motion, and can get her hand " behind her head and back.      Return if symptoms worsen or fail to improve.      Medical Decision Making  Management Options : close treatment of fracture or dislocation  Data/Risk: radiology tests and independent visualization of imaging, lab tests, or EMG/NCV      Franco Mujica MD  01/13/20  12:01 PM

## 2020-01-14 ENCOUNTER — TREATMENT (OUTPATIENT)
Dept: PHYSICAL THERAPY | Facility: CLINIC | Age: 85
End: 2020-01-14

## 2020-01-14 DIAGNOSIS — M25.612 POST-TRAUMATIC STIFFNESS OF LEFT SHOULDER JOINT: ICD-10-CM

## 2020-01-14 DIAGNOSIS — M25.512 CHRONIC LEFT SHOULDER PAIN: ICD-10-CM

## 2020-01-14 DIAGNOSIS — S42.295S OTHER CLOSED NONDISPLACED FRACTURE OF PROXIMAL END OF LEFT HUMERUS, SEQUELA: Primary | ICD-10-CM

## 2020-01-14 DIAGNOSIS — G89.29 CHRONIC LEFT SHOULDER PAIN: ICD-10-CM

## 2020-01-14 PROCEDURE — 97110 THERAPEUTIC EXERCISES: CPT | Performed by: PHYSICAL THERAPIST

## 2020-01-14 NOTE — PROGRESS NOTES
Physical Therapy Daily Progress Note        Patient: Antonette Alvarez   : 1935  Diagnosis/ICD-10 Code:  Other closed nondisplaced fracture of proximal end of left humerus, sequela [S42.295S]  Referring practitioner: Franco Mujica MD  Date of Initial Visit: Type: THERAPY  Noted: 12/10/2019  Today's Date: 2020  Patient seen for 5 sessions         Patient reports that she is doing great and feels she can continue PT independently at home.  She has been released by ortho.  She still has a little pain with lowering from an elevated position. Her pain level is 0-1/10 upon arrival.      Objective   Full PROM elevation.        See Exercise, Manual, and Modality Logs for complete treatment.       Assessment & Plan     Assessment  Assessment details: Patient is independent with her current program and appropriate to transition to [facility].    Plan  Plan details: Hold PT, follow up PRN over the next three weeks.              Timed:  Manual Therapy:    0     mins  88421;  Therapeutic Exercise:    30     mins  02137;     Neuromuscular Eduin:    0    mins  38330;    Therapeutic Activity:     0     mins  21810;     Gait Trainin     mins  74222;     Ultrasound:     0     mins  64986;    Electrical Stimulation:    0     mins  52017 ( );    Untimed:  Electrical Stimulation:    0     mins  61665 ( );  Mechanical Traction:    0     mins  74693;     Timed Treatment:   30   mins   Total Treatment:     30   mins  Aaron Zarco, PT  Physical Therapist

## 2020-01-21 ENCOUNTER — OFFICE VISIT (OUTPATIENT)
Dept: RADIATION ONCOLOGY | Facility: HOSPITAL | Age: 85
End: 2020-01-21

## 2020-01-21 ENCOUNTER — HOSPITAL ENCOUNTER (OUTPATIENT)
Dept: RADIATION ONCOLOGY | Facility: HOSPITAL | Age: 85
Setting detail: RADIATION/ONCOLOGY SERIES
Discharge: HOME | End: 2020-01-21

## 2020-01-21 VITALS
WEIGHT: 175.3 LBS | TEMPERATURE: 98.4 F | DIASTOLIC BLOOD PRESSURE: 70 MMHG | BODY MASS INDEX: 29.2 KG/M2 | OXYGEN SATURATION: 93 % | HEIGHT: 65 IN | HEART RATE: 63 BPM | SYSTOLIC BLOOD PRESSURE: 160 MMHG | RESPIRATION RATE: 16 BRPM

## 2020-01-21 DIAGNOSIS — C34.2 PRIMARY CANCER OF RIGHT MIDDLE LOBE OF LUNG (HCC): Primary | ICD-10-CM

## 2020-01-21 PROCEDURE — G0463 HOSPITAL OUTPT CLINIC VISIT: HCPCS

## 2020-01-21 NOTE — PROGRESS NOTES
FOLLOW UP NOTE    PATIENT:                                                      Antonette Alvarez  MEDICAL RECORD #:                        [id number]  :                                                          1935  COMPLETION DATE:    2019  DIAGNOSIS:     Primary cancer of right middle lobe of lung (CMS/HCC)  - Stage IIA (cT2b, cN0, cM0)      BRIEF HISTORY:  Ms. Alvarez returns for initial follow-up.  Remote history of multiple myeloma, in remission.  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  MRI of brain and whole body PET scan did not show evidence of metastatic disease.  She was not considered a good operative candidate.  She underwent definitive treatment with CyberKnife SBRT to the right lung mass with 48 Gy in 4 fractions.  She tolerated treatment well.  She did develop moderate post-treatment fatigue, now improving.  Denies hemoptysis, shortness of air, or chest pain.  She does have occasional cough with clear sputum.  She did not develop esophagitis symptoms and maintains a stable weight.  She continues to heal from a left humerus fracture status post fall.        MEDICATIONS: Medication reconciliation for the patient was reviewed and confirmed in the electronic medical record.    Review of Systems   Constitutional: Positive for fatigue.   Respiratory: Positive for cough. Negative for hemoptysis and shortness of breath.    Cardiovascular: Positive for leg swelling.   Musculoskeletal: Positive for arthralgias.   All other systems reviewed and are negative.      KPS 80%    Physical Exam   Constitutional: She is oriented to person, place, and time. She appears well-developed and well-nourished. No distress.   HENT:   Head: Normocephalic and atraumatic.   Eyes: Pupils are equal, round, and reactive to light. Conjunctivae are normal.   Neck: Normal range of motion. Neck supple.   Cardiovascular: Normal rate and regular rhythm.  "  Pulmonary/Chest: Effort normal and breath sounds normal. She has no wheezes. She exhibits no tenderness.   Musculoskeletal: She exhibits edema and tenderness.   RLE 1+ edema.  LLE 2+ edema.  Range of motion to LUE limited by pain.  Tender to palpation over left humeral head.   Lymphadenopathy:     She has no cervical adenopathy.        Right: No supraclavicular adenopathy present.        Left: No supraclavicular adenopathy present.   Neurological: She is alert and oriented to person, place, and time.   Skin: Skin is warm and dry.   Psychiatric: She has a normal mood and affect. Her behavior is normal. Judgment and thought content normal.   Nursing note and vitals reviewed.      VITAL SIGNS:   Vitals:    01/21/20 1330   BP: 160/70   Pulse: 63   Resp: 16   Temp: 98.4 °F (36.9 °C)   TempSrc: Temporal   SpO2: 93%   Weight: 79.5 kg (175 lb 4.8 oz)   Height: 165.1 cm (65\")   PainSc: 0-No pain       The following portions of the patient's history were reviewed and updated as appropriate: allergies, current medications, past family history, past medical history, past social history, past surgical history and problem list.       Antonette was seen today for primary cancer of right middle lobe of lung.    Diagnoses and all orders for this visit:    Primary cancer of right middle lobe of lung (CMS/HCC)      IMPRESSION:    History of medically inoperable, right middle lobe lung adenocarcinoma, clinical stage IIA (T2b, N0, M0).  She is 1 month status post definitive treatment with CyberKnife SBRT.  She tolerated treatment well.    No signs or symptoms of acute radiation-related toxicities.  PET scan has been previously ordered to determine tumor response to treatment.  We discussed follow-up intervals, surveillance imaging, and expectations for response to treatment.  She remains under the medical oncologic care of Dr. Mirza.      RECOMMENDATIONS:  Ms. Alvarez is scheduled for PET scan at the end of March, and will follow up with " Dr. Mirza on 4/7/2020.  I will try to coordinate 3 month follow-up same day in our office.    Return in about 11 weeks (around 4/7/2020) for Office Visit.    MEENA Falk

## 2020-03-24 ENCOUNTER — DOCUMENTATION (OUTPATIENT)
Dept: PHYSICAL THERAPY | Facility: CLINIC | Age: 85
End: 2020-03-24

## 2020-03-24 DIAGNOSIS — C34.2 PRIMARY CANCER OF RIGHT MIDDLE LOBE OF LUNG (HCC): Primary | ICD-10-CM

## 2020-03-24 NOTE — PROGRESS NOTES
Discharge Summary  Discharge Summary from Physical Therapy Report      Dates  PT visit: 12/10/2019-1/14/2020  Number of Visits: 5/7     Discharge Status of Patient: Patient was making really good progress, MD was agreeable to transition to [facility] and she did not require follow up after last visit.    Goals: Partially Met    Discharge Plan: Continue with current home exercise program as instructed      Date of Discharge 2/14/2020        Aaron Zarco, PT  Physical Therapist

## 2020-03-30 ENCOUNTER — TELEPHONE (OUTPATIENT)
Dept: ONCOLOGY | Facility: CLINIC | Age: 85
End: 2020-03-30

## 2020-03-30 NOTE — TELEPHONE ENCOUNTER
Spoke with Doron and rescheduled patient to 5/8/20 for visit and then transferred the call to central scheduling so he could r/s PET Scan.

## 2020-03-30 NOTE — TELEPHONE ENCOUNTER
CALLER- MEENU DAVIES (SON)    PT-REDD BLACK-DR. KIESHA CORRIGAN ASKED TO SPEAK WITH DR. HENDERSON OR A NURSE ABOUT POSTPONING THE PT'S 4/7/20 F/U APPT WITH DR. HENDERSON.     THE PT WAS SUPPOSED TO F/U EVERY 3 MONTHS BUT MAY RISK DECLINING HEALTH.    SHE JUST FINISHED TREATMENT WITH PLAN TO F/U.     MEENU IS ASKING IF IT'S POSSIBLE TO DELAY THE APPT TO 5/7/20 WITHOUT NEGATIVELY IMPACTING PT'S HEALTH.    MEENU'S PHONE #:    [phone])

## 2020-04-02 ENCOUNTER — APPOINTMENT (OUTPATIENT)
Dept: PET IMAGING | Facility: HOSPITAL | Age: 85
End: 2020-04-02

## 2020-05-04 ENCOUNTER — HOSPITAL ENCOUNTER (OUTPATIENT)
Dept: PET IMAGING | Facility: HOSPITAL | Age: 85
Discharge: HOME | End: 2020-05-04

## 2020-05-04 ENCOUNTER — HOSPITAL ENCOUNTER (OUTPATIENT)
Dept: PET IMAGING | Facility: HOSPITAL | Age: 85
Discharge: HOME | End: 2020-05-04
Admitting: INTERNAL MEDICINE

## 2020-05-04 DIAGNOSIS — C34.2 PRIMARY CANCER OF RIGHT MIDDLE LOBE OF LUNG (HCC): ICD-10-CM

## 2020-05-04 LAB — GLUCOSE BLDC GLUCOMTR-MCNC: 100 MG/DL (ref 70–130)

## 2020-05-04 PROCEDURE — 78815 PET IMAGE W/CT SKULL-THIGH: CPT

## 2020-05-04 PROCEDURE — A9552 F18 FDG: HCPCS | Performed by: INTERNAL MEDICINE

## 2020-05-04 PROCEDURE — 82962 GLUCOSE BLOOD TEST: CPT

## 2020-05-04 PROCEDURE — 0 FLUDEOXYGLUCOSE F18 SOLUTION: Performed by: INTERNAL MEDICINE

## 2020-05-04 RX ADMIN — FLUDEOXYGLUCOSE F18 1 DOSE: 300 INJECTION INTRAVENOUS at 10:52

## 2020-05-08 ENCOUNTER — TELEMEDICINE (OUTPATIENT)
Dept: ONCOLOGY | Facility: CLINIC | Age: 85
End: 2020-05-08

## 2020-05-08 DIAGNOSIS — C34.2 PRIMARY CANCER OF RIGHT MIDDLE LOBE OF LUNG (HCC): Primary | ICD-10-CM

## 2020-05-08 PROCEDURE — 99442 PR PHYS/QHP TELEPHONE EVALUATION 11-20 MIN: CPT | Performed by: INTERNAL MEDICINE

## 2020-05-08 NOTE — PROGRESS NOTES
DATE OF VISIT: 5/8/2020    REASON FOR VISIT: Followup for right middle lobe non-small cell lung cancer     HISTORY OF PRESENT ILLNESS: The patient is a very pleasant 84 y.o. female  with past medical history significant for right lung cancer.  Patient was treated with CyberKnife addition treatment January 2020. The patient is here today for a follow-up visit.    SUBJECTIVE: The patient has been doing fairly well. she was interviewed today using telemedicine given the current pandemic.  Her son was sitting next to her. she denied any fever or  chills, no night sweats, denied any headaches    PAST MEDICAL HISTORY/SOCIAL HISTORY/FAMILY HISTORY: Reviewed by me and unchanged from my documentation done on 05/08/20.    Review of Systems   Constitutional: Negative for activity change, appetite change, chills, fatigue, fever and unexpected weight change.   HENT: Negative for hearing loss, mouth sores, nosebleeds, sore throat and trouble swallowing.    Eyes: Negative for visual disturbance.   Respiratory: Negative for cough, chest tightness, shortness of breath and wheezing.    Cardiovascular: Negative for chest pain, palpitations and leg swelling.   Gastrointestinal: Negative for abdominal distention, abdominal pain, blood in stool, constipation, diarrhea, nausea, rectal pain and vomiting.   Endocrine: Negative for cold intolerance and heat intolerance.   Genitourinary: Negative for difficulty urinating, dysuria, frequency and urgency.   Musculoskeletal: Negative for arthralgias, back pain, gait problem, joint swelling and myalgias.   Skin: Negative for rash.   Neurological: Negative for dizziness, tremors, syncope, weakness, light-headedness, numbness and headaches.   Hematological: Negative for adenopathy. Does not bruise/bleed easily.   Psychiatric/Behavioral: Negative for confusion, sleep disturbance and suicidal ideas. The patient is not nervous/anxious.          Current Outpatient Medications:   •  aspirin 81 MG  tablet, Take 81 mg by mouth., Disp: , Rfl:   •  atorvastatin (LIPITOR) 10 MG tablet, Take 10 mg by mouth., Disp: , Rfl:   •  citalopram (CeleXA) 20 MG tablet, Take 20 mg by mouth., Disp: , Rfl:   •  clopidogrel (PLAVIX) 75 MG tablet, Take 75 mg by mouth., Disp: , Rfl:   •  gabapentin (NEURONTIN) 100 MG capsule, Take 100 mg by mouth., Disp: , Rfl:   •  HM OMEGA-3-6-9 FATTY ACIDS capsule, Take 1,200 mg by mouth., Disp: , Rfl:   •  lisinopril (PRINIVIL,ZESTRIL) 2.5 MG tablet, Take 2.5 mg by mouth., Disp: , Rfl:   •  melatonin 5 MG tablet tablet, Take 5 mg by mouth At Night As Needed., Disp: , Rfl:   •  RABEprazole (ACIPHEX) 20 MG EC tablet, Take 20 mg by mouth., Disp: , Rfl:     PHYSICAL EXAMINATION:   There were no vitals taken for this visit.   There were no vitals filed for this visit.    ECOG Performance Status: 1 - Symptomatic but completely ambulatory  General Appearance:  alert, cooperative, no apparent distress and appears stated age   Neurologic/Psychiatric: A&O x 3, gait steady, appropriate affect, strength 5/5 in all muscle groups   HEENT:     Neck:    Lungs:      Heart:     Abdomen:      Lymph nodes:    Extremities:    Skin:      Hospital Outpatient Visit on 05/04/2020   Component Date Value Ref Range Status   • Glucose 05/04/2020 100  70 - 130 mg/dL Final        Nm Pet Skull Base To Mid Thigh    Result Date: 5/5/2020  Narrative: EXAMINATION: NM PET SKULL BASE TO MID THIGH-  INDICATION: C34.2-Malignant neoplasm of middle lobe, bronchus or lung; followup lung cancer, multiple myeloma.  TECHNIQUE: With fasting blood glucose level of 100 mg/dL, a total of 12.08 mCi of FDG was administered via the right upper arm. Following appropriate delay, PET CT imaging was obtained from the skull vertex to the mid thighs bilaterally and fused multiplanar images were reconstructed. The CT scan is an unenhanced study and used for reference to the PET scan only and should not be considered a diagnostic CT scan. PET CT imaging  was reviewed in the axial, coronal, and sagittal planes as well as within the cine mode.  COMPARISON: Subsequent exam for treatment with prior study available for comparison dated 11/18/2019.  FINDINGS: There is normal variant activity identified within the oropharynx and muscles of phonation.  Normal variant activity identified in the region of the vocal cords. There is degenerative changes identified within the cervical spine. Uptake is seen in the paraspinal muscles. There is asymmetric uptake again seen of tracer within the right thyroid lobe. No hypermetabolic focus is identified within the neck to suggest evidence of malignancy or metastatic disease. There is significant decrease seen in size of the mass in the periphery of the right upper lobe. There is only minimal minimal residual markings identified on today's examination. No significant hypermetabolic activity associated with the residual opacification. Previously noted maximum SUV was 9.0. Today's maximum SUV in the residual area is 1.3 and falls well below the threshold level for malignancy. There is evidence of a tiny right pleural effusion. Cardiac activity is within normal limits. Within the abdomen and pelvis there is normal variant activity seen within the GI and  tract. No hypermetabolic focus to suggest evidence of malignancy or metastatic disease. Upper thighs are unremarkable.      Impression: 1. Stable activity identified in the right thyroid lobe. Previously seen activity in the mass in the right upper lobe and its periphery is resolved in the interval. There is minimal residual nodular density remaining that demonstrates no significant hypermetabolic activity. Remainder of the exam is grossly unremarkable. 2. Essentially negative PET CT scan.  D:  05/04/2020 E:  05/04/2020    This report was finalized on 5/5/2020 11:48 AM by Dr. Jessica Melendez MD.        ASSESSMENT: The patient is a very pleasant 84 y.o. female  with  right middle lobe  lung cancer    PROBLEM LIST:   1. Right middle lobe lung adenocarcinoma, [id number] stage IIA:  A. Presented with abnormal chest x-ray  B. Ct chest showed 4.3 cm right middle lobe lung cancer.  C. Ct guided biopsy done 10/25/19 showed moderately differentiated adenocarcinoma  D.  MRI brain whole-body PET scan done on November 2019 did not show any evidence of metastatic disease  2.  Coronary artery disease  3.  Hypertension  4.  Peripheral neuropathy  5. Multiple myeloma:  A.  Treated on clinical trial at the [facility] using regimen containing thalidomide.  B.  Has been remission for the last 10 years    PLAN:  1.  I did go over the PET scan results with the patient and her son Doron reassured there is no evidence of residual or recurrent disease.  2.  The patient follow-up with us in 6-month with another PET scan per  3.  We will repeat serum protein pheresis and return.    This visit has been rescheduled as a phone visit to comply with patient safety concerns in accordance with CDC recommendations. Total time of discussion was 15 minutes.      Andrzej Mirza MD  5/8/2020

## 2020-05-11 ENCOUNTER — HOSPITAL ENCOUNTER (OUTPATIENT)
Dept: RADIATION ONCOLOGY | Facility: HOSPITAL | Age: 85
Setting detail: RADIATION/ONCOLOGY SERIES
Discharge: HOME | End: 2020-05-11

## 2020-11-10 ENCOUNTER — LAB (OUTPATIENT)
Dept: LAB | Facility: HOSPITAL | Age: 85
End: 2020-11-10

## 2020-11-10 ENCOUNTER — HOSPITAL ENCOUNTER (OUTPATIENT)
Dept: PET IMAGING | Facility: HOSPITAL | Age: 85
Discharge: HOME | End: 2020-11-10

## 2020-11-10 DIAGNOSIS — C34.2 PRIMARY CANCER OF RIGHT MIDDLE LOBE OF LUNG (HCC): ICD-10-CM

## 2020-11-10 LAB
ALBUMIN SERPL-MCNC: 4.3 G/DL (ref 3.5–5.2)
ALBUMIN/GLOB SERPL: 2.2 G/DL
ALP SERPL-CCNC: 70 U/L (ref 39–117)
ALT SERPL W P-5'-P-CCNC: 14 U/L (ref 1–33)
ANION GAP SERPL CALCULATED.3IONS-SCNC: 10 MMOL/L (ref 5–15)
AST SERPL-CCNC: 20 U/L (ref 1–32)
BASOPHILS # BLD AUTO: 0.07 10*3/MM3 (ref 0–0.2)
BASOPHILS NFR BLD AUTO: 1.2 % (ref 0–1.5)
BILIRUB SERPL-MCNC: 0.5 MG/DL (ref 0–1.2)
BUN SERPL-MCNC: 14 MG/DL (ref 8–23)
BUN/CREAT SERPL: 20 (ref 7–25)
CALCIUM SPEC-SCNC: 9.7 MG/DL (ref 8.6–10.5)
CHLORIDE SERPL-SCNC: 104 MMOL/L (ref 98–107)
CO2 SERPL-SCNC: 28 MMOL/L (ref 22–29)
CREAT SERPL-MCNC: 0.7 MG/DL (ref 0.57–1)
DEPRECATED RDW RBC AUTO: 51.2 FL (ref 37–54)
EOSINOPHIL # BLD AUTO: 0.19 10*3/MM3 (ref 0–0.4)
EOSINOPHIL NFR BLD AUTO: 3.2 % (ref 0.3–6.2)
ERYTHROCYTE [DISTWIDTH] IN BLOOD BY AUTOMATED COUNT: 15.5 % (ref 12.3–15.4)
GFR SERPL CREATININE-BSD FRML MDRD: 80 ML/MIN/1.73
GLOBULIN UR ELPH-MCNC: 2 GM/DL
GLUCOSE BLDC GLUCOMTR-MCNC: 105 MG/DL (ref 70–130)
GLUCOSE SERPL-MCNC: 96 MG/DL (ref 65–99)
HCT VFR BLD AUTO: 35.7 % (ref 34–46.6)
HGB BLD-MCNC: 10.9 G/DL (ref 12–15.9)
IMM GRANULOCYTES # BLD AUTO: 0.02 10*3/MM3 (ref 0–0.05)
IMM GRANULOCYTES NFR BLD AUTO: 0.3 % (ref 0–0.5)
LYMPHOCYTES # BLD AUTO: 1.46 10*3/MM3 (ref 0.7–3.1)
LYMPHOCYTES NFR BLD AUTO: 24.9 % (ref 19.6–45.3)
MCH RBC QN AUTO: 27.2 PG (ref 26.6–33)
MCHC RBC AUTO-ENTMCNC: 30.5 G/DL (ref 31.5–35.7)
MCV RBC AUTO: 89 FL (ref 79–97)
MONOCYTES # BLD AUTO: 0.57 10*3/MM3 (ref 0.1–0.9)
MONOCYTES NFR BLD AUTO: 9.7 % (ref 5–12)
NEUTROPHILS NFR BLD AUTO: 3.55 10*3/MM3 (ref 1.7–7)
NEUTROPHILS NFR BLD AUTO: 60.7 % (ref 42.7–76)
NRBC BLD AUTO-RTO: 0 /100 WBC (ref 0–0.2)
PLATELET # BLD AUTO: 176 10*3/MM3 (ref 140–450)
PMV BLD AUTO: 12.3 FL (ref 6–12)
POTASSIUM SERPL-SCNC: 4.8 MMOL/L (ref 3.5–5.2)
PROT SERPL-MCNC: 6.3 G/DL (ref 6–8.5)
RBC # BLD AUTO: 4.01 10*6/MM3 (ref 3.77–5.28)
SODIUM SERPL-SCNC: 142 MMOL/L (ref 136–145)
WBC # BLD AUTO: 5.86 10*3/MM3 (ref 3.4–10.8)

## 2020-11-10 PROCEDURE — 80053 COMPREHEN METABOLIC PANEL: CPT

## 2020-11-10 PROCEDURE — 0 FLUDEOXYGLUCOSE F18 SOLUTION: Performed by: INTERNAL MEDICINE

## 2020-11-10 PROCEDURE — 85025 COMPLETE CBC W/AUTO DIFF WBC: CPT

## 2020-11-10 PROCEDURE — A9552 F18 FDG: HCPCS | Performed by: INTERNAL MEDICINE

## 2020-11-10 PROCEDURE — 84155 ASSAY OF PROTEIN SERUM: CPT

## 2020-11-10 PROCEDURE — 78815 PET IMAGE W/CT SKULL-THIGH: CPT

## 2020-11-10 PROCEDURE — 84165 PROTEIN E-PHORESIS SERUM: CPT

## 2020-11-10 PROCEDURE — 36415 COLL VENOUS BLD VENIPUNCTURE: CPT

## 2020-11-10 PROCEDURE — 82962 GLUCOSE BLOOD TEST: CPT

## 2020-11-10 RX ADMIN — FLUDEOXYGLUCOSE F18 1 DOSE: 300 INJECTION INTRAVENOUS at 10:25

## 2020-11-11 ENCOUNTER — TELEPHONE (OUTPATIENT)
Dept: ONCOLOGY | Facility: CLINIC | Age: 85
End: 2020-11-11

## 2020-11-11 LAB
ALBUMIN SERPL ELPH-MCNC: 3.8 G/DL (ref 2.9–4.4)
ALBUMIN/GLOB SERPL: 1.3 {RATIO} (ref 0.7–1.7)
ALPHA1 GLOB SERPL ELPH-MCNC: 0.3 G/DL (ref 0–0.4)
ALPHA2 GLOB SERPL ELPH-MCNC: 1 G/DL (ref 0.4–1)
B-GLOBULIN SERPL ELPH-MCNC: 1 G/DL (ref 0.7–1.3)
GAMMA GLOB SERPL ELPH-MCNC: 0.7 G/DL (ref 0.4–1.8)
GLOBULIN SER CALC-MCNC: 3 G/DL (ref 2.2–3.9)
LABORATORY COMMENT REPORT: NORMAL
M PROTEIN SERPL ELPH-MCNC: NORMAL G/DL
PROT PATTERN SERPL ELPH-IMP: NORMAL
PROT SERPL-MCNC: 6.8 G/DL (ref 6–8.5)

## 2020-11-11 NOTE — TELEPHONE ENCOUNTER
Spoke with pt son Doron I told him that he may come in with his mother for her appt with MEENA Gastelum. He verbalized understanding.

## 2020-11-11 NOTE — TELEPHONE ENCOUNTER
Caller: MEENU DAVIES    Relationship to patient: SON    Best call back number: [phone]    REQUEST THAT HE BE ALLOWED TO  COME IN WITH HIS MOTHER TO HER APPT, SAYS SHE IS NOT STABLE AT THE MOMENT AND HE WANTS TO ASSIST HER

## 2020-11-13 ENCOUNTER — OFFICE VISIT (OUTPATIENT)
Dept: ONCOLOGY | Facility: CLINIC | Age: 85
End: 2020-11-13

## 2020-11-13 VITALS
SYSTOLIC BLOOD PRESSURE: 170 MMHG | TEMPERATURE: 96.9 F | RESPIRATION RATE: 20 BRPM | HEIGHT: 65 IN | OXYGEN SATURATION: 94 % | HEART RATE: 72 BPM | WEIGHT: 183 LBS | DIASTOLIC BLOOD PRESSURE: 77 MMHG | BODY MASS INDEX: 30.49 KG/M2

## 2020-11-13 DIAGNOSIS — C34.2 PRIMARY CANCER OF RIGHT MIDDLE LOBE OF LUNG (HCC): Primary | ICD-10-CM

## 2020-11-13 PROCEDURE — 99213 OFFICE O/P EST LOW 20 MIN: CPT | Performed by: NURSE PRACTITIONER

## 2020-11-13 NOTE — PROGRESS NOTES
DATE OF VISIT: 11/13/2020    REASON FOR VISIT: Followup for right middle lobe non-small cell lung cancer     HISTORY OF PRESENT ILLNESS: The patient is a very pleasant 85 y.o. female  with past medical history significant for right lung cancer.  Patient was treated with CyberKnife addition treatment January 2020. The patient is here today for a follow-up visit.    SUBJECTIVE: The patient is here today with her son. She has been doing fairly well. She has had no significant changes in health history. She denies new bone or joint pain. She still has some difficulty with her speech and gait since having a stoke a few years ago. She has been staying in during the pandemic, and has gained some weight.     PAST MEDICAL HISTORY/SOCIAL HISTORY/FAMILY HISTORY: Reviewed by me and unchanged from my documentation done on 11/13/20.    Review of Systems   Constitutional: Negative for activity change, appetite change, chills, fatigue, fever and unexpected weight change.        Weight gain   HENT: Negative for hearing loss, mouth sores, nosebleeds, sore throat and trouble swallowing.    Eyes: Negative for visual disturbance.   Respiratory: Negative for cough, chest tightness, shortness of breath and wheezing.    Cardiovascular: Negative for chest pain, palpitations and leg swelling.   Gastrointestinal: Negative for abdominal distention, abdominal pain, blood in stool, constipation, diarrhea, nausea, rectal pain and vomiting.   Endocrine: Negative for cold intolerance and heat intolerance.   Genitourinary: Negative for difficulty urinating, dysuria, frequency and urgency.   Musculoskeletal: Positive for gait problem. Negative for arthralgias, back pain, joint swelling and myalgias.        Weakness to left leg   Skin: Negative for rash.   Neurological: Negative for dizziness, tremors, syncope, weakness, light-headedness, numbness and headaches.        Mild expressive aphasia   Hematological: Negative for adenopathy. Does not  "bruise/bleed easily.   Psychiatric/Behavioral: Negative for confusion, sleep disturbance and suicidal ideas. The patient is not nervous/anxious.          Current Outpatient Medications:   •  aspirin 81 MG tablet, Take 81 mg by mouth., Disp: , Rfl:   •  atorvastatin (LIPITOR) 10 MG tablet, Take 10 mg by mouth., Disp: , Rfl:   •  citalopram (CeleXA) 20 MG tablet, Take 20 mg by mouth., Disp: , Rfl:   •  clopidogrel (PLAVIX) 75 MG tablet, Take 75 mg by mouth., Disp: , Rfl:   •  gabapentin (NEURONTIN) 100 MG capsule, Take 100 mg by mouth., Disp: , Rfl:   •  HM OMEGA-3-6-9 FATTY ACIDS capsule, Take 1,200 mg by mouth., Disp: , Rfl:   •  lisinopril (PRINIVIL,ZESTRIL) 2.5 MG tablet, Take 2.5 mg by mouth., Disp: , Rfl:   •  melatonin 5 MG tablet tablet, Take 5 mg by mouth At Night As Needed., Disp: , Rfl:   •  RABEprazole (ACIPHEX) 20 MG EC tablet, Take 20 mg by mouth., Disp: , Rfl:     PHYSICAL EXAMINATION:   /77 Comment: RUE  Pulse 72   Temp 96.9 °F (36.1 °C) (Temporal)   Resp 20   Ht 165.1 cm (65\")   Wt 83 kg (183 lb)   SpO2 94% Comment: RA  BMI 30.45 kg/m²    Pain Score    11/13/20 1335   PainSc:   3   PainLoc: Leg  Comment: Left       ECOG Performance Status: 1 - Symptomatic but completely ambulatory  General Appearance:  alert, cooperative, no apparent distress and appears stated age   Neurologic/Psychiatric: A&O x 3, slight limp, but gain steady, appropriate affect.   HEENT:  Normocephalic, without obvious abnormality, mucous membranes moist   Neck: Supple, symmetrical, trachea midline, no adenopathy;  No thyromegaly, masses, or tenderness   Lungs:   Clear to auscultation bilaterally; respirations regular, even, and unlabored bilaterally   Heart:  Regular rate and rhythm, no murmurs appreciated   Abdomen:      Lymph nodes: No cervical, supraclavicular, inguinal or axillary adenopathy noted   Extremities: Normal, atraumatic; no clubbing, cyanosis, or edema    Skin: No rashes, ulcers, or suspicious lesions " noted     Hospital Outpatient Visit on 11/10/2020   Component Date Value Ref Range Status   • Glucose 11/10/2020 105  70 - 130 mg/dL Final   Lab on 11/10/2020   Component Date Value Ref Range Status   • Glucose 11/10/2020 96  65 - 99 mg/dL Final   • BUN 11/10/2020 14  8 - 23 mg/dL Final   • Creatinine 11/10/2020 0.70  0.57 - 1.00 mg/dL Final   • Sodium 11/10/2020 142  136 - 145 mmol/L Final   • Potassium 11/10/2020 4.8  3.5 - 5.2 mmol/L Final    Specimen hemolyzed.  Results may be affected.   • Chloride 11/10/2020 104  98 - 107 mmol/L Final   • CO2 11/10/2020 28.0  22.0 - 29.0 mmol/L Final   • Calcium 11/10/2020 9.7  8.6 - 10.5 mg/dL Final   • Total Protein 11/10/2020 6.3  6.0 - 8.5 g/dL Final   • Albumin 11/10/2020 4.30  3.50 - 5.20 g/dL Final   • ALT (SGPT) 11/10/2020 14  1 - 33 U/L Final    Specimen hemolyzed.  Results may be affected.   • AST (SGOT) 11/10/2020 20  1 - 32 U/L Final   • Alkaline Phosphatase 11/10/2020 70  39 - 117 U/L Final   • Total Bilirubin 11/10/2020 0.5  0.0 - 1.2 mg/dL Final   • eGFR Non African Amer 11/10/2020 80  >60 mL/min/1.73 Final   • Globulin 11/10/2020 2.0  gm/dL Final   • A/G Ratio 11/10/2020 2.2  g/dL Final   • BUN/Creatinine Ratio 11/10/2020 20.0  7.0 - 25.0 Final   • Anion Gap 11/10/2020 10.0  5.0 - 15.0 mmol/L Final   • Total Protein 11/10/2020 6.8  6.0 - 8.5 g/dL Final   • Albumin 11/10/2020 3.8  2.9 - 4.4 g/dL Final   • Alpha-1-Globulin 11/10/2020 0.3  0.0 - 0.4 g/dL Final   • Alpha-2-Globulin 11/10/2020 1.0  0.4 - 1.0 g/dL Final   • Beta Globulin 11/10/2020 1.0  0.7 - 1.3 g/dL Final   • Gamma Globulin 11/10/2020 0.7  0.4 - 1.8 g/dL Final   • M-Scott 11/10/2020 Not Observed  Not Observed g/dL Final   • Globulin 11/10/2020 3.0  2.2 - 3.9 g/dL Final   • A/G Ratio 11/10/2020 1.3  0.7 - 1.7 Final   • Please note 11/10/2020 Comment   Final    Protein electrophoresis scan will follow via computer, mail, or   delivery.   • SPE Interpretation 11/10/2020 Comment   Final    The  SPE pattern appears unremarkable. Evidence of monoclonal  protein is not apparent.   • WBC 11/10/2020 5.86  3.40 - 10.80 10*3/mm3 Final   • RBC 11/10/2020 4.01  3.77 - 5.28 10*6/mm3 Final   • Hemoglobin 11/10/2020 10.9* 12.0 - 15.9 g/dL Final   • Hematocrit 11/10/2020 35.7  34.0 - 46.6 % Final   • MCV 11/10/2020 89.0  79.0 - 97.0 fL Final   • MCH 11/10/2020 27.2  26.6 - 33.0 pg Final   • MCHC 11/10/2020 30.5* 31.5 - 35.7 g/dL Final   • RDW 11/10/2020 15.5* 12.3 - 15.4 % Final   • RDW-SD 11/10/2020 51.2  37.0 - 54.0 fl Final   • MPV 11/10/2020 12.3* 6.0 - 12.0 fL Final   • Platelets 11/10/2020 176  140 - 450 10*3/mm3 Final   • Neutrophil % 11/10/2020 60.7  42.7 - 76.0 % Final   • Lymphocyte % 11/10/2020 24.9  19.6 - 45.3 % Final   • Monocyte % 11/10/2020 9.7  5.0 - 12.0 % Final   • Eosinophil % 11/10/2020 3.2  0.3 - 6.2 % Final   • Basophil % 11/10/2020 1.2  0.0 - 1.5 % Final   • Immature Grans % 11/10/2020 0.3  0.0 - 0.5 % Final   • Neutrophils, Absolute 11/10/2020 3.55  1.70 - 7.00 10*3/mm3 Final   • Lymphocytes, Absolute 11/10/2020 1.46  0.70 - 3.10 10*3/mm3 Final   • Monocytes, Absolute 11/10/2020 0.57  0.10 - 0.90 10*3/mm3 Final   • Eosinophils, Absolute 11/10/2020 0.19  0.00 - 0.40 10*3/mm3 Final   • Basophils, Absolute 11/10/2020 0.07  0.00 - 0.20 10*3/mm3 Final   • Immature Grans, Absolute 11/10/2020 0.02  0.00 - 0.05 10*3/mm3 Final   • nRBC 11/10/2020 0.0  0.0 - 0.2 /100 WBC Final        Nm Pet Skull Base To Mid Thigh    Result Date: 11/11/2020  Narrative: EXAMINATION: NM PET, SKULL BASE TO MID THIGH-11/10/2020:  INDICATION: F/U scan; C34.2-Malignant neoplasm of middle lobe, bronchus or lung.  TECHNIQUE: Fasting blood glucose 105 mg/dL. Radiopharmaceutical injection 12.0 mCi of F-18 FDG injected into a right AC vein with the patient imaged after an appropriate amount of time. CT datasets performed for fusion analysis alone and should not be used for diagnostic purposes as these are low-dose protocol.   COMPARISON: PET/CT dated 05/04/2020.  FINDINGS:  HEAD AND NECK: Physiologic activity within grossly symmetric cerebral hemispheres on limited intracranial evaluation. Physiologic activity within the extraocular muscles, muscles of phonation and tonsils with continued asymmetric activity in the right thyroid, maximum SUV 4.8 versus 4.5 on prior comparison with at least some areas of calcification present. No new site of abnormal hypermetabolism within the head and neck identified.  CHEST: Physiologic activity within the left ventricular myocardium without abnormal hypermetabolism of the chest or pulmonary parenchyma otherwise identified.  ABDOMEN AND PELVIS: Physiologic activity within the liver, spleen, renal collecting systems and GI tract without abnormal hypermetabolism of the abdomen or pelvis.  Osseous structures and proximal thighs unremarkable for abnormal hypermetabolism.      Impression: Continued asymmetric and mildly increased activity in the right thyroid lobe previously identified with mild progression of hypermetabolism. Essentially negative PET/CT otherwise.  D:  11/10/2020 E:  11/10/2020     This report was finalized on 11/11/2020 12:05 PM by Dr. Stevie Beck.        ASSESSMENT: The patient is a very pleasant 85 y.o. female  with  right middle lobe lung cancer    PROBLEM LIST:   1. Right middle lobe lung adenocarcinoma, [id number] stage IIA:  A. Presented with abnormal chest x-ray  B. Ct chest showed 4.3 cm right middle lobe lung cancer.  C. Ct guided biopsy done 10/25/19 showed moderately differentiated adenocarcinoma  D.  MRI brain whole-body PET scan done on November 2019 did not show any evidence of metastatic disease  2.  Coronary artery disease  3.  Hypertension  4.  Peripheral neuropathy  5. Multiple myeloma:  A.  Treated on clinical trial at the [facility] using regimen containing thalidomide.  B.  Has been remission for the last 10 years    PLAN:  1.  I reviewed the PET scan results  with the patient and her son. I reviewed the images myself. I told the patient she has no evidence of new or progressive disease. She still has some mild uptake in her thyroid however it is stable.   2. I reviewed the lab results with the patient. I reassured her that her M-spike is not observed. Her kidney function and liver enzymes are normal. She has mild normocytic anemia with normal WBC and platelets.   3. We will see the patient back in 6 months with repeat PET scan ordered prior to return as well as repeat labs including CBC, CMP, and SPEP.  4. I advised she call and return sooner with any new symptoms such as worsening cough, chest pain, bone pain, unexplained weight loss, or fatigue for evaluation of symptoms.   5. She will continue Plavix and aspirin as well as Lipitor secondary to history of CVA.         Oriana Last, APRN  11/13/2020

## 2021-01-01 ENCOUNTER — TELEPHONE (OUTPATIENT)
Dept: INFUSION THERAPY | Facility: HOSPITAL | Age: 86
End: 2021-01-01

## 2021-01-01 ENCOUNTER — HOSPITAL ENCOUNTER (OUTPATIENT)
Dept: PET IMAGING | Facility: HOSPITAL | Age: 86
Discharge: HOME | End: 2021-07-06

## 2021-01-01 ENCOUNTER — HOSPITAL ENCOUNTER (OUTPATIENT)
Dept: CT IMAGING | Facility: HOSPITAL | Age: 86
Discharge: HOME | End: 2021-11-29
Admitting: INTERNAL MEDICINE

## 2021-01-01 ENCOUNTER — OFFICE VISIT (OUTPATIENT)
Dept: ONCOLOGY | Facility: CLINIC | Age: 86
End: 2021-01-01

## 2021-01-01 ENCOUNTER — HOSPITAL ENCOUNTER (OUTPATIENT)
Dept: RADIATION ONCOLOGY | Facility: HOSPITAL | Age: 86
Discharge: HOME | End: 2021-08-26

## 2021-01-01 ENCOUNTER — OFFICE VISIT (OUTPATIENT)
Dept: RADIATION ONCOLOGY | Facility: HOSPITAL | Age: 86
End: 2021-01-01

## 2021-01-01 ENCOUNTER — TELEPHONE (OUTPATIENT)
Dept: RADIATION ONCOLOGY | Facility: HOSPITAL | Age: 86
End: 2021-01-01

## 2021-01-01 ENCOUNTER — HOSPITAL ENCOUNTER (OUTPATIENT)
Dept: RADIATION ONCOLOGY | Facility: HOSPITAL | Age: 86
Discharge: HOME | End: 2021-08-30

## 2021-01-01 ENCOUNTER — LAB (OUTPATIENT)
Dept: LAB | Facility: HOSPITAL | Age: 86
End: 2021-01-01

## 2021-01-01 ENCOUNTER — HOSPITAL ENCOUNTER (OUTPATIENT)
Dept: RADIATION ONCOLOGY | Facility: HOSPITAL | Age: 86
Setting detail: RADIATION/ONCOLOGY SERIES
Discharge: HOME | End: 2021-08-18

## 2021-01-01 ENCOUNTER — APPOINTMENT (OUTPATIENT)
Dept: CT IMAGING | Facility: HOSPITAL | Age: 86
End: 2021-01-01

## 2021-01-01 ENCOUNTER — HOSPITAL ENCOUNTER (OUTPATIENT)
Dept: RADIATION ONCOLOGY | Facility: HOSPITAL | Age: 86
Discharge: HOME | End: 2021-08-31

## 2021-01-01 ENCOUNTER — HOSPITAL ENCOUNTER (OUTPATIENT)
Dept: RADIATION ONCOLOGY | Facility: HOSPITAL | Age: 86
Setting detail: RADIATION/ONCOLOGY SERIES
Discharge: HOME | End: 2021-07-14

## 2021-01-01 ENCOUNTER — TELEPHONE (OUTPATIENT)
Dept: ONCOLOGY | Facility: CLINIC | Age: 86
End: 2021-01-01

## 2021-01-01 ENCOUNTER — HOSPITAL ENCOUNTER (OUTPATIENT)
Dept: CT IMAGING | Facility: HOSPITAL | Age: 86
Discharge: HOME | End: 2021-08-10
Admitting: RADIOLOGY

## 2021-01-01 ENCOUNTER — HOSPITAL ENCOUNTER (OUTPATIENT)
Dept: CT IMAGING | Facility: HOSPITAL | Age: 86
Discharge: HOME | End: 2021-08-11
Admitting: RADIOLOGY

## 2021-01-01 ENCOUNTER — HOSPITAL ENCOUNTER (OUTPATIENT)
Dept: PET IMAGING | Facility: HOSPITAL | Age: 86
End: 2021-01-01

## 2021-01-01 ENCOUNTER — APPOINTMENT (OUTPATIENT)
Dept: PET IMAGING | Facility: HOSPITAL | Age: 86
End: 2021-01-01

## 2021-01-01 ENCOUNTER — HOSPITAL ENCOUNTER (OUTPATIENT)
Dept: RADIATION ONCOLOGY | Facility: HOSPITAL | Age: 86
Setting detail: RADIATION/ONCOLOGY SERIES
Discharge: HOME | End: 2021-10-04

## 2021-01-01 ENCOUNTER — HOSPITAL ENCOUNTER (OUTPATIENT)
Dept: CT IMAGING | Facility: HOSPITAL | Age: 86
Discharge: HOME | End: 2021-05-21

## 2021-01-01 ENCOUNTER — HOSPITAL ENCOUNTER (OUTPATIENT)
Dept: RADIATION ONCOLOGY | Facility: HOSPITAL | Age: 86
Discharge: HOME | End: 2021-08-27

## 2021-01-01 VITALS
OXYGEN SATURATION: 90 % | BODY MASS INDEX: 30.49 KG/M2 | SYSTOLIC BLOOD PRESSURE: 142 MMHG | HEIGHT: 65 IN | HEART RATE: 77 BPM | WEIGHT: 183 LBS | DIASTOLIC BLOOD PRESSURE: 65 MMHG | TEMPERATURE: 96.4 F | RESPIRATION RATE: 18 BRPM

## 2021-01-01 VITALS
WEIGHT: 183 LBS | HEIGHT: 65 IN | SYSTOLIC BLOOD PRESSURE: 183 MMHG | HEART RATE: 79 BPM | TEMPERATURE: 97.9 F | OXYGEN SATURATION: 90 % | BODY MASS INDEX: 30.49 KG/M2 | DIASTOLIC BLOOD PRESSURE: 73 MMHG | RESPIRATION RATE: 18 BRPM

## 2021-01-01 VITALS
TEMPERATURE: 96.8 F | BODY MASS INDEX: 29.32 KG/M2 | OXYGEN SATURATION: 99 % | WEIGHT: 182.4 LBS | SYSTOLIC BLOOD PRESSURE: 159 MMHG | DIASTOLIC BLOOD PRESSURE: 79 MMHG | HEART RATE: 74 BPM | HEIGHT: 66 IN | RESPIRATION RATE: 20 BRPM

## 2021-01-01 VITALS
HEART RATE: 83 BPM | BODY MASS INDEX: 29.28 KG/M2 | DIASTOLIC BLOOD PRESSURE: 64 MMHG | OXYGEN SATURATION: 93 % | SYSTOLIC BLOOD PRESSURE: 127 MMHG | TEMPERATURE: 97.7 F | WEIGHT: 181.4 LBS | RESPIRATION RATE: 20 BRPM

## 2021-01-01 VITALS
WEIGHT: 180 LBS | HEART RATE: 87 BPM | SYSTOLIC BLOOD PRESSURE: 187 MMHG | BODY MASS INDEX: 28.93 KG/M2 | RESPIRATION RATE: 18 BRPM | OXYGEN SATURATION: 93 % | DIASTOLIC BLOOD PRESSURE: 79 MMHG | HEIGHT: 66 IN | TEMPERATURE: 97.5 F

## 2021-01-01 VITALS
BODY MASS INDEX: 29.25 KG/M2 | DIASTOLIC BLOOD PRESSURE: 63 MMHG | WEIGHT: 182 LBS | SYSTOLIC BLOOD PRESSURE: 133 MMHG | RESPIRATION RATE: 18 BRPM | HEART RATE: 71 BPM | OXYGEN SATURATION: 94 % | TEMPERATURE: 98 F | HEIGHT: 66 IN

## 2021-01-01 VITALS
DIASTOLIC BLOOD PRESSURE: 72 MMHG | OXYGEN SATURATION: 93 % | TEMPERATURE: 97.6 F | BODY MASS INDEX: 30.56 KG/M2 | HEART RATE: 75 BPM | WEIGHT: 183.4 LBS | SYSTOLIC BLOOD PRESSURE: 155 MMHG | RESPIRATION RATE: 16 BRPM | HEIGHT: 65 IN

## 2021-01-01 DIAGNOSIS — C34.2 PRIMARY CANCER OF RIGHT MIDDLE LOBE OF LUNG (HCC): ICD-10-CM

## 2021-01-01 DIAGNOSIS — C34.2 PRIMARY CANCER OF RIGHT MIDDLE LOBE OF LUNG (HCC): Primary | ICD-10-CM

## 2021-01-01 DIAGNOSIS — C79.72 MALIGNANT NEOPLASM METASTATIC TO LEFT ADRENAL GLAND (HCC): ICD-10-CM

## 2021-01-01 DIAGNOSIS — C79.72 MALIGNANT NEOPLASM METASTATIC TO LEFT ADRENAL GLAND (HCC): Primary | ICD-10-CM

## 2021-01-01 LAB
ALBUMIN SERPL ELPH-MCNC: 3.8 G/DL (ref 2.9–4.4)
ALBUMIN SERPL-MCNC: 4.2 G/DL (ref 3.5–5.2)
ALBUMIN/GLOB SERPL: 1.3 {RATIO} (ref 0.7–1.7)
ALBUMIN/GLOB SERPL: 1.8 G/DL
ALP SERPL-CCNC: 75 U/L (ref 39–117)
ALPHA1 GLOB SERPL ELPH-MCNC: 0.2 G/DL (ref 0–0.4)
ALPHA2 GLOB SERPL ELPH-MCNC: 0.8 G/DL (ref 0.4–1)
ALT SERPL W P-5'-P-CCNC: 11 U/L (ref 1–33)
ANION GAP SERPL CALCULATED.3IONS-SCNC: 7 MMOL/L (ref 5–15)
AST SERPL-CCNC: 16 U/L (ref 1–32)
B-GLOBULIN SERPL ELPH-MCNC: 1.2 G/DL (ref 0.7–1.3)
BASOPHILS # BLD AUTO: 0.06 10*3/MM3 (ref 0–0.2)
BASOPHILS NFR BLD AUTO: 0.9 % (ref 0–1.5)
BILIRUB SERPL-MCNC: 0.5 MG/DL (ref 0–1.2)
BUN SERPL-MCNC: 14 MG/DL (ref 8–23)
BUN/CREAT SERPL: 21.2 (ref 7–25)
CALCIUM SPEC-SCNC: 9.9 MG/DL (ref 8.6–10.5)
CHLORIDE SERPL-SCNC: 103 MMOL/L (ref 98–107)
CO2 SERPL-SCNC: 28 MMOL/L (ref 22–29)
CREAT BLDA-MCNC: 0.7 MG/DL (ref 0.6–1.3)
CREAT BLDA-MCNC: 0.7 MG/DL (ref 0.6–1.3)
CREAT SERPL-MCNC: 0.66 MG/DL (ref 0.57–1)
DEPRECATED RDW RBC AUTO: 52.6 FL (ref 37–54)
EOSINOPHIL # BLD AUTO: 0.2 10*3/MM3 (ref 0–0.4)
EOSINOPHIL NFR BLD AUTO: 2.9 % (ref 0.3–6.2)
ERYTHROCYTE [DISTWIDTH] IN BLOOD BY AUTOMATED COUNT: 16.1 % (ref 12.3–15.4)
GAMMA GLOB SERPL ELPH-MCNC: 0.7 G/DL (ref 0.4–1.8)
GFR SERPL CREATININE-BSD FRML MDRD: 85 ML/MIN/1.73
GLOBULIN SER CALC-MCNC: 3 G/DL (ref 2.2–3.9)
GLOBULIN UR ELPH-MCNC: 2.4 GM/DL
GLUCOSE BLDC GLUCOMTR-MCNC: 96 MG/DL (ref 70–130)
GLUCOSE SERPL-MCNC: 102 MG/DL (ref 65–99)
HCT VFR BLD AUTO: 36 % (ref 34–46.6)
HGB BLD-MCNC: 11.2 G/DL (ref 12–15.9)
IMM GRANULOCYTES # BLD AUTO: 0.03 10*3/MM3 (ref 0–0.05)
IMM GRANULOCYTES NFR BLD AUTO: 0.4 % (ref 0–0.5)
INR PPP: 0.96 (ref 0.85–1.16)
LABORATORY COMMENT REPORT: NORMAL
LYMPHOCYTES # BLD AUTO: 1.62 10*3/MM3 (ref 0.7–3.1)
LYMPHOCYTES NFR BLD AUTO: 23.4 % (ref 19.6–45.3)
M PROTEIN SERPL ELPH-MCNC: NORMAL G/DL
MCH RBC QN AUTO: 27.9 PG (ref 26.6–33)
MCHC RBC AUTO-ENTMCNC: 31.1 G/DL (ref 31.5–35.7)
MCV RBC AUTO: 89.6 FL (ref 79–97)
MONOCYTES # BLD AUTO: 0.69 10*3/MM3 (ref 0.1–0.9)
MONOCYTES NFR BLD AUTO: 10 % (ref 5–12)
NEUTROPHILS NFR BLD AUTO: 4.32 10*3/MM3 (ref 1.7–7)
NEUTROPHILS NFR BLD AUTO: 62.4 % (ref 42.7–76)
NRBC BLD AUTO-RTO: 0 /100 WBC (ref 0–0.2)
PLATELET # BLD AUTO: 193 10*3/MM3 (ref 140–450)
PLATELET # BLD AUTO: 218 10*3/MM3 (ref 140–450)
PMV BLD AUTO: 11.6 FL (ref 6–12)
POTASSIUM SERPL-SCNC: 4.6 MMOL/L (ref 3.5–5.2)
PROT PATTERN SERPL ELPH-IMP: NORMAL
PROT SERPL-MCNC: 6.6 G/DL (ref 6–8.5)
PROT SERPL-MCNC: 6.8 G/DL (ref 6–8.5)
PROTHROMBIN TIME: 12.5 SECONDS (ref 11.4–14.4)
RBC # BLD AUTO: 4.02 10*6/MM3 (ref 3.77–5.28)
SODIUM SERPL-SCNC: 138 MMOL/L (ref 136–145)
WBC # BLD AUTO: 6.92 10*3/MM3 (ref 3.4–10.8)

## 2021-01-01 PROCEDURE — 80053 COMPREHEN METABOLIC PANEL: CPT | Performed by: NURSE PRACTITIONER

## 2021-01-01 PROCEDURE — 77012 CT SCAN FOR NEEDLE BIOPSY: CPT

## 2021-01-01 PROCEDURE — 77290 THER RAD SIMULAJ FIELD CPLX: CPT | Performed by: RADIOLOGY

## 2021-01-01 PROCEDURE — G0463 HOSPITAL OUTPT CLINIC VISIT: HCPCS

## 2021-01-01 PROCEDURE — 85610 PROTHROMBIN TIME: CPT | Performed by: RADIOLOGY

## 2021-01-01 PROCEDURE — 77300 RADIATION THERAPY DOSE PLAN: CPT | Performed by: RADIOLOGY

## 2021-01-01 PROCEDURE — 25010000002 DIPHENHYDRAMINE PER 50 MG: Performed by: RADIOLOGY

## 2021-01-01 PROCEDURE — 82565 ASSAY OF CREATININE: CPT

## 2021-01-01 PROCEDURE — 71260 CT THORAX DX C+: CPT

## 2021-01-01 PROCEDURE — 0 FLUDEOXYGLUCOSE F18 SOLUTION: Performed by: INTERNAL MEDICINE

## 2021-01-01 PROCEDURE — 99152 MOD SED SAME PHYS/QHP 5/>YRS: CPT

## 2021-01-01 PROCEDURE — 85049 AUTOMATED PLATELET COUNT: CPT | Performed by: RADIOLOGY

## 2021-01-01 PROCEDURE — 99214 OFFICE O/P EST MOD 30 MIN: CPT | Performed by: INTERNAL MEDICINE

## 2021-01-01 PROCEDURE — 85025 COMPLETE CBC W/AUTO DIFF WBC: CPT | Performed by: NURSE PRACTITIONER

## 2021-01-01 PROCEDURE — 99153 MOD SED SAME PHYS/QHP EA: CPT

## 2021-01-01 PROCEDURE — 77373 STRTCTC BDY RAD THER TX DLVR: CPT | Performed by: RADIOLOGY

## 2021-01-01 PROCEDURE — 25010000002 IOPAMIDOL 61 % SOLUTION: Performed by: INTERNAL MEDICINE

## 2021-01-01 PROCEDURE — 77295 3-D RADIOTHERAPY PLAN: CPT | Performed by: RADIOLOGY

## 2021-01-01 PROCEDURE — 74177 CT ABD & PELVIS W/CONTRAST: CPT

## 2021-01-01 PROCEDURE — 77334 RADIATION TREATMENT AID(S): CPT | Performed by: RADIOLOGY

## 2021-01-01 PROCEDURE — A4648 IMPLANTABLE TISSUE MARKER: HCPCS

## 2021-01-01 PROCEDURE — 25010000002 FENTANYL CITRATE (PF) 50 MCG/ML SOLUTION: Performed by: RADIOLOGY

## 2021-01-01 PROCEDURE — 36415 COLL VENOUS BLD VENIPUNCTURE: CPT | Performed by: INTERNAL MEDICINE

## 2021-01-01 PROCEDURE — 25010000003 LIDOCAINE 1 % SOLUTION: Performed by: RADIOLOGY

## 2021-01-01 PROCEDURE — 77332 RADIATION TREATMENT AID(S): CPT | Performed by: RADIOLOGY

## 2021-01-01 PROCEDURE — 84165 PROTEIN E-PHORESIS SERUM: CPT | Performed by: NURSE PRACTITIONER

## 2021-01-01 PROCEDURE — 25010000002 IOPAMIDOL 61 % SOLUTION: Performed by: NURSE PRACTITIONER

## 2021-01-01 PROCEDURE — 82962 GLUCOSE BLOOD TEST: CPT

## 2021-01-01 PROCEDURE — A9552 F18 FDG: HCPCS | Performed by: INTERNAL MEDICINE

## 2021-01-01 PROCEDURE — 78815 PET IMAGE W/CT SKULL-THIGH: CPT

## 2021-01-01 PROCEDURE — 77336 RADIATION PHYSICS CONSULT: CPT | Performed by: RADIOLOGY

## 2021-01-01 RX ORDER — SODIUM CHLORIDE 0.9 % (FLUSH) 0.9 %
10 SYRINGE (ML) INJECTION AS NEEDED
Status: DISCONTINUED | OUTPATIENT
Start: 2021-01-01 | End: 2021-01-01 | Stop reason: HOSPADM

## 2021-01-01 RX ORDER — MIDAZOLAM HYDROCHLORIDE 1 MG/ML
INJECTION INTRAMUSCULAR; INTRAVENOUS
Status: DISCONTINUED
Start: 2021-01-01 | End: 2021-01-01 | Stop reason: WASHOUT

## 2021-01-01 RX ORDER — FENTANYL CITRATE 50 UG/ML
INJECTION, SOLUTION INTRAMUSCULAR; INTRAVENOUS
Status: DISPENSED
Start: 2021-01-01 | End: 2021-01-01

## 2021-01-01 RX ORDER — SODIUM CHLORIDE 0.9 % (FLUSH) 0.9 %
3 SYRINGE (ML) INJECTION EVERY 12 HOURS SCHEDULED
Status: DISCONTINUED | OUTPATIENT
Start: 2021-01-01 | End: 2021-01-01 | Stop reason: HOSPADM

## 2021-01-01 RX ORDER — ONDANSETRON 8 MG/1
8 TABLET, ORALLY DISINTEGRATING ORAL EVERY 8 HOURS PRN
Qty: 10 TABLET | Refills: 0 | Status: SHIPPED | OUTPATIENT
Start: 2021-01-01

## 2021-01-01 RX ORDER — TOLTERODINE 4 MG/1
CAPSULE, EXTENDED RELEASE ORAL
COMMUNITY
Start: 2021-04-21

## 2021-01-01 RX ORDER — LIDOCAINE HYDROCHLORIDE 10 MG/ML
20 INJECTION, SOLUTION INFILTRATION; PERINEURAL ONCE
Status: COMPLETED | OUTPATIENT
Start: 2021-01-01 | End: 2021-01-01

## 2021-01-01 RX ORDER — DIPHENHYDRAMINE HYDROCHLORIDE 50 MG/ML
INJECTION INTRAMUSCULAR; INTRAVENOUS
Status: DISPENSED
Start: 2021-01-01 | End: 2021-01-01

## 2021-01-01 RX ORDER — DIPHENHYDRAMINE HYDROCHLORIDE 50 MG/ML
INJECTION INTRAMUSCULAR; INTRAVENOUS
Status: DISCONTINUED
Start: 2021-01-01 | End: 2021-01-01 | Stop reason: HOSPADM

## 2021-01-01 RX ORDER — ALENDRONATE SODIUM 70 MG/1
TABLET ORAL
COMMUNITY
Start: 2021-01-01

## 2021-01-01 RX ORDER — LISINOPRIL 5 MG/1
5 TABLET ORAL DAILY
COMMUNITY
Start: 2021-01-01

## 2021-01-01 RX ORDER — GABAPENTIN 300 MG/1
CAPSULE ORAL
COMMUNITY
Start: 2021-01-01

## 2021-01-01 RX ORDER — ONDANSETRON 8 MG/1
8 TABLET, ORALLY DISINTEGRATING ORAL EVERY 8 HOURS PRN
Qty: 10 TABLET | Refills: 0 | Status: CANCELLED | OUTPATIENT
Start: 2021-01-01

## 2021-01-01 RX ORDER — PREDNISOLONE ACETATE 10 MG/ML
SUSPENSION/ DROPS OPHTHALMIC
COMMUNITY
Start: 2021-01-01

## 2021-01-01 RX ORDER — DIPHENHYDRAMINE HYDROCHLORIDE 50 MG/ML
INJECTION INTRAMUSCULAR; INTRAVENOUS
Status: COMPLETED | OUTPATIENT
Start: 2021-01-01 | End: 2021-01-01

## 2021-01-01 RX ORDER — FENTANYL CITRATE 50 UG/ML
INJECTION, SOLUTION INTRAMUSCULAR; INTRAVENOUS
Status: COMPLETED | OUTPATIENT
Start: 2021-01-01 | End: 2021-01-01

## 2021-01-01 RX ADMIN — IOPAMIDOL 85 ML: 612 INJECTION, SOLUTION INTRAVENOUS at 11:00

## 2021-01-01 RX ADMIN — LIDOCAINE HYDROCHLORIDE 20 ML: 10 INJECTION, SOLUTION INFILTRATION; PERINEURAL at 09:12

## 2021-01-01 RX ADMIN — FENTANYL CITRATE 25 MCG: 0.05 INJECTION, SOLUTION INTRAMUSCULAR; INTRAVENOUS at 09:01

## 2021-01-01 RX ADMIN — FLUDEOXYGLUCOSE F18 1 DOSE: 300 INJECTION INTRAVENOUS at 09:16

## 2021-01-01 RX ADMIN — IOPAMIDOL 85 ML: 612 INJECTION, SOLUTION INTRAVENOUS at 14:21

## 2021-01-01 RX ADMIN — DIPHENHYDRAMINE HYDROCHLORIDE 50 MG: 50 INJECTION INTRAMUSCULAR; INTRAVENOUS at 09:00

## 2021-05-07 NOTE — TELEPHONE ENCOUNTER
Spoke with Doron and patient is scheduled for CT Scan on 5/21/21 Arrive at 10:30am and Scan at 11:00am. NPO 2 hours prior. Sees Dr. Mirza same day at 1:45pm. Spoke with Amy lettmartinez Sal know that they will do all labs with scan on 5/21/21.

## 2021-05-07 NOTE — TELEPHONE ENCOUNTER
Caller: MEENU DAVIES    Relationship: SON    Best call back number: [phone]    What orders are you requesting (i.e. lab or imaging):LABS/ BLOODWORK TO CHECK MYLOMA PER [facility]    In what timeframe would the patient need to come in: 5/21 W/ CT SCAN    Where will you receive your lab/imaging services:[facility] LOCATION    Additional notes:

## 2021-05-07 NOTE — TELEPHONE ENCOUNTER
----- Message from MEENA Mckeon sent at 5/7/2021  9:07 AM EDT -----  Regarding: RE: PET SCAN  Ok to switch to CTs  ----- Message -----  From: Amy Cespedes, RN  Sent: 5/7/2021  To: MEENA Mckeon  Subject: FW: PET SCAN                                     Can you review and let me know if you want me to switch this to ct scans?    ----- Message -----  From: Lesli Ag  Sent: 5/6/2021   2:56 PM EDT  To: MEENA Mckeon, [facility] Nurse [facility]  Subject: PET SCAN                                         Patient is scheduled for PET scan on Monday 5/10 with diagnosis Primary cancer of right middle lobe of lung (CMS/HCC) (C34.2. patient last treatment was in January 2020 with post PET done in May 2020 Essentially negative PET CT scan, repeat PET in November 2020  Essentially negative PET which per notes in November states no evidence of new or progressive disease. She still has some mild uptake in her thyroid however it is stable. Since patient is currently receiving no treatment and the last 2 PET's are essentially negative PET does not meet Medicare guidelines as Medicare does not cover PET for surveillance    Thank you,  PET Compliance

## 2021-05-21 NOTE — PROGRESS NOTES
DATE OF VISIT: 5/21/2021    REASON FOR VISIT: Followup for right middle lobe non-small cell lung cancer     HISTORY OF PRESENT ILLNESS: The patient is a very pleasant 85 y.o. female  with past medical history significant for right lung cancer.  Patient was treated with CyberKnife radiation treatment January 2020. The patient is here today for a follow-up visit.    SUBJECTIVE: The patient is here today with her son.  She is doing fairly well.  She denies any fever chills night sweats.  She is anxious about the scan results.    PAST MEDICAL HISTORY/SOCIAL HISTORY/FAMILY HISTORY: Reviewed by me and unchanged from my documentation done on 05/21/21.    Review of Systems   Constitutional: Negative for activity change, appetite change, chills, fatigue, fever and unexpected weight change.        Weight gain   HENT: Negative for hearing loss, mouth sores, nosebleeds, sore throat and trouble swallowing.    Eyes: Negative for visual disturbance.   Respiratory: Negative for cough, chest tightness, shortness of breath and wheezing.    Cardiovascular: Negative for chest pain, palpitations and leg swelling.   Gastrointestinal: Negative for abdominal distention, abdominal pain, blood in stool, constipation, diarrhea, nausea, rectal pain and vomiting.   Endocrine: Negative for cold intolerance and heat intolerance.   Genitourinary: Negative for difficulty urinating, dysuria, frequency and urgency.   Musculoskeletal: Positive for gait problem. Negative for arthralgias, back pain, joint swelling and myalgias.        Weakness to left leg   Skin: Negative for rash.   Neurological: Negative for dizziness, tremors, syncope, weakness, light-headedness, numbness and headaches.        Mild expressive aphasia   Hematological: Negative for adenopathy. Does not bruise/bleed easily.   Psychiatric/Behavioral: Negative for confusion, sleep disturbance and suicidal ideas. The patient is not nervous/anxious.          Current Outpatient Medications:  "  •  aspirin 81 MG tablet, Take 81 mg by mouth., Disp: , Rfl:   •  atorvastatin (LIPITOR) 10 MG tablet, Take 10 mg by mouth., Disp: , Rfl:   •  citalopram (CeleXA) 20 MG tablet, Take 20 mg by mouth., Disp: , Rfl:   •  clopidogrel (PLAVIX) 75 MG tablet, Take 75 mg by mouth., Disp: , Rfl:   •  gabapentin (NEURONTIN) 100 MG capsule, Take 100 mg by mouth., Disp: , Rfl:   •  HM OMEGA-3-6-9 FATTY ACIDS capsule, Take 1,200 mg by mouth., Disp: , Rfl:   •  lisinopril (PRINIVIL,ZESTRIL) 2.5 MG tablet, Take 2.5 mg by mouth., Disp: , Rfl:   •  melatonin 5 MG tablet tablet, Take 5 mg by mouth At Night As Needed., Disp: , Rfl:   •  RABEprazole (ACIPHEX) 20 MG EC tablet, Take 20 mg by mouth., Disp: , Rfl:   No current facility-administered medications for this visit.    PHYSICAL EXAMINATION:   /65   Pulse 77   Temp 96.4 °F (35.8 °C) (Temporal)   Resp 18   Ht 165.1 cm (65\")   Wt 83 kg (183 lb)   SpO2 90%   BMI 30.45 kg/m²    Pain Score    05/21/21 1337   PainSc: 0-No pain       ECOG Performance Status: 1 - Symptomatic but completely ambulatory  General Appearance:  alert, cooperative, no apparent distress and appears stated age   Neurologic/Psychiatric: A&O x 3, slight limp, but gain steady, appropriate affect.   HEENT:  Normocephalic, without obvious abnormality, mucous membranes moist   Neck: Supple, symmetrical, trachea midline, no adenopathy;  No thyromegaly, masses, or tenderness   Lungs:   Clear to auscultation bilaterally; respirations regular, even, and unlabored bilaterally   Heart:  Regular rate and rhythm, no murmurs appreciated   Abdomen:      Lymph nodes: No cervical, supraclavicular, inguinal or axillary adenopathy noted   Extremities: Normal, atraumatic; no clubbing, cyanosis, or edema    Skin: No rashes, ulcers, or suspicious lesions noted     No visits with results within 2 Week(s) from this visit.   Latest known visit with results is:   Hospital Outpatient Visit on 11/10/2020   Component Date Value " Ref Range Status   • Glucose 11/10/2020 105  70 - 130 mg/dL Final        CT Chest With Contrast Diagnostic, CT Abdomen Pelvis With Contrast    Result Date: 5/21/2021  Narrative: EXAMINATION: CT CHEST W CONTRAST DIAGNOSTIC-, CT ABDOMEN AND PELVIS W CONTRAST-  INDICATION: Followup scans; C34.2-Malignant neoplasm of middle lobe, bronchus or lung.  TECHNIQUE: 5 mm post IV contrast images through chest. 5 mm post IV contrast portal venous phase and delayed venous phase images through the abdomen and pelvis.  The radiation dose reduction device was turned on for each scan per the ALARA (As Low as Reasonably Achievable) protocol.  COMPARISON: Whole-body PET/CT scan 11/10/2020.  FINDINGS: History indicates previous right lung cancer, status post CyberKnife therapy. Also prior history of multiple myeloma. No current symptoms, followup. Previous 11/10/2020 whole-body PET scan by report showed mild right thyroid lobectomy, but essentially negative study for metastatic disease.  CHEST CT SCAN WITH IV CONTRAST: Small partially calcified right thyroid nodule appears stable. No significant mediastinal adenopathy or other mass is seen. Scattered shotty right paratracheal and AP window lymph nodes appear stable. No axillary or supraclavicular adenopathy is seen. There is no evidence of pericardial or pleural effusion. There is a moderate-sized hiatal hernia. Lung window images show stable area of groundglass disease in the anterior left apex, and very small area of groundglass disease in the posterior left upper lobe also unchanged. Small groundglass lesion adjacent the mid left major fissure is stable. There is stable postinflammatory or posttreatment scarring of the right lower lobe and adjacent right middle lobe. No new or progressive pulmonary parenchymal lesion, new airspace disease or effusion is seen. Bony structures appear to be intact.      Impression: Stable areas of bilateral lung scarring and groundglass disease compared  to previous PET CT scan. Moderate-sized hiatal hernia. No new or progressive chest disease is seen.  ABDOMEN AND PELVIS CT SCAN WITH IV CONTRAST: There appear to be one or two minute left lobe liver cysts, but both areas are stable from the prior study. No new liver lesions are seen. Gallbladder, spleen, pancreas, and right adrenal gland appears stable. There is new enlargement of the left adrenal gland, primarily enlargement of the medial limb which measures approximately 33 x 16 mm, previously approximately 22 x 6 mm. The adrenal gland was nonhypermetabolic on the prior study. No upper abdominal adenopathy, ascites or acute inflammatory change is seen. There are multiple bilateral renal cysts. Incidental note is made of a benign-appearing pure fat density lipoma in the right lateral upper abdominal wall, unchanged.  Regarding the lower abdomen and pelvis, large and small bowel loops are normal in caliber and grossly normal in appearance. Bladder is nondistended and normal in appearance. Uterus is premenopausal in size. Ovaries are not visible, apparently atrophic. Bony structures appear to be intact. There is previous L2 biconcave depression and grade 2 anterior subluxation of L4 on L5 but no acute bony trauma is seen.  IMPRESSION: New enlargement of the medial limb of left adrenal gland, concerning for metastasis. No evidence of potential metastatic disease elsewhere.  D:  05/21/2021 E:  05/21/2021        ASSESSMENT: The patient is a very pleasant 85 y.o. female  with  right middle lobe lung cancer    PROBLEM LIST:   1. Right middle lobe lung adenocarcinoma, [id number] stage IIA:  A. Presented with abnormal chest x-ray  B. Ct chest showed 4.3 cm right middle lobe lung cancer.  C. Ct guided biopsy done 10/25/19 showed moderately differentiated adenocarcinoma  D.  MRI brain whole-body PET scan done on November 2019 did not show any evidence of metastatic disease  2.  Coronary artery disease  3.  Hypertension  4.   Peripheral neuropathy  5. Multiple myeloma:  A.  Treated on clinical trial at the present [place] using regimen containing thalidomide.  B.  Has been remission for the last 10 years    PLAN:  1.  I reviewed the CT scan results with the patient and her son.  I reviewed the images myself.  My interpretation is: Right adrenal nodule that is new from before.  2. I we will arrange for whole-body PET scan.  If this area is hypermetabolic and isolated we will arrange for either CyberKnife or resection on the other hand if she has evidence of other sites of disease we will talk about systemic treatment.   3.  She will follow-up with me to go over the results.  4.  I did go over the blood work results from November 10, 2020 reassured her there was no evidence of monoclonal gammopathy.   5. She will continue Plavix and aspirin as well as Lipitor secondary to history of CVA.         Andrzej Mirza MD  5/21/2021

## 2021-07-09 NOTE — PROGRESS NOTES
DATE OF VISIT: 7/9/2021    REASON FOR VISIT: Followup for right middle lobe non-small cell lung cancer     HISTORY OF PRESENT ILLNESS: The patient is a very pleasant 85 y.o. female  with past medical history significant for right lung cancer.  Patient was treated with CyberKnife radiation treatment January 2020. The patient is here today for a follow-up visit.    SUBJECTIVE: The patient is here today with her son.  She is doing fairly well she denied fever chills night sweats she denies abdominal pain.  She is getting ready go on vacation for the next 4 weeks.    PAST MEDICAL HISTORY/SOCIAL HISTORY/FAMILY HISTORY: Reviewed by me and unchanged from my documentation done on 07/09/21.    Review of Systems   Constitutional: Negative for activity change, appetite change, chills, fatigue, fever and unexpected weight change.        Weight gain   HENT: Negative for hearing loss, mouth sores, nosebleeds, sore throat and trouble swallowing.    Eyes: Negative for visual disturbance.   Respiratory: Negative for cough, chest tightness, shortness of breath and wheezing.    Cardiovascular: Negative for chest pain, palpitations and leg swelling.   Gastrointestinal: Negative for abdominal distention, abdominal pain, blood in stool, constipation, diarrhea, nausea, rectal pain and vomiting.   Endocrine: Negative for cold intolerance and heat intolerance.   Genitourinary: Negative for difficulty urinating, dysuria, frequency and urgency.   Musculoskeletal: Positive for gait problem. Negative for arthralgias, back pain, joint swelling and myalgias.        Weakness to left leg   Skin: Negative for rash.   Neurological: Negative for dizziness, tremors, syncope, weakness, light-headedness, numbness and headaches.        Mild expressive aphasia   Hematological: Negative for adenopathy. Does not bruise/bleed easily.   Psychiatric/Behavioral: Negative for confusion, sleep disturbance and suicidal ideas. The patient is not nervous/anxious.   "        Current Outpatient Medications:   •  alendronate (FOSAMAX) 70 MG tablet, TAKE 1 TABLET BY MOUTH ONE TIME A WEEK, Disp: , Rfl:   •  aspirin 81 MG tablet, Take 81 mg by mouth., Disp: , Rfl:   •  atorvastatin (LIPITOR) 10 MG tablet, Take 10 mg by mouth., Disp: , Rfl:   •  citalopram (CeleXA) 20 MG tablet, Take 20 mg by mouth., Disp: , Rfl:   •  clopidogrel (PLAVIX) 75 MG tablet, Take 75 mg by mouth., Disp: , Rfl:   •  gabapentin (NEURONTIN) 100 MG capsule, Take 100 mg by mouth., Disp: , Rfl:   •  gabapentin (NEURONTIN) 300 MG capsule, TAKE 1 CAPSULE BY MOUTH THREE TIMES A DAY FOR NEUROPATHY, Disp: , Rfl:   •  HM OMEGA-3-6-9 FATTY ACIDS capsule, Take 1,200 mg by mouth., Disp: , Rfl:   •  lisinopril (PRINIVIL,ZESTRIL) 2.5 MG tablet, Take 2.5 mg by mouth., Disp: , Rfl:   •  lisinopril (PRINIVIL,ZESTRIL) 5 MG tablet, Take 5 mg by mouth Daily. for blood pressure., Disp: , Rfl:   •  melatonin 5 MG tablet tablet, Take 5 mg by mouth At Night As Needed., Disp: , Rfl:   •  RABEprazole (ACIPHEX) 20 MG EC tablet, Take 20 mg by mouth., Disp: , Rfl:   •  tolterodine LA (DETROL LA) 4 MG 24 hr capsule, TAKE 1 CAPSULE BY MOUTH AT BEDTIME FOR BLADDER, Disp: , Rfl:     PHYSICAL EXAMINATION:   BP (!) 183/73   Pulse 79   Temp 97.9 °F (36.6 °C) (Temporal)   Resp 18   Ht 165.1 cm (65\")   Wt 83 kg (183 lb)   SpO2 90%   BMI 30.45 kg/m²    Pain Score    07/09/21 1428   PainSc: 0-No pain       ECOG Performance Status: 1 - Symptomatic but completely ambulatory  General Appearance:  alert, cooperative, no apparent distress and appears stated age   Neurologic/Psychiatric: A&O x 3, slight limp, but gain steady, appropriate affect.   HEENT:  Normocephalic, without obvious abnormality, mucous membranes moist   Neck: Supple, symmetrical, trachea midline, no adenopathy;  No thyromegaly, masses, or tenderness   Lungs:   Clear to auscultation bilaterally; respirations regular, even, and unlabored bilaterally   Heart:  Regular rate and " rhythm, no murmurs appreciated   Abdomen:      Lymph nodes: No cervical, supraclavicular, inguinal or axillary adenopathy noted   Extremities: Normal, atraumatic; no clubbing, cyanosis, or edema    Skin: No rashes, ulcers, or suspicious lesions noted     Hospital Outpatient Visit on 07/06/2021   Component Date Value Ref Range Status   • Glucose 07/06/2021 96  70 - 130 mg/dL Final    Meter: [id number] : [id number] Bonny Lopes        NM Pet Skull Base To Mid Thigh    Result Date: 7/6/2021  Narrative: EXAMINATION: NM PET, SKULL BASE TO MID THIGH-07/06/2021:  INDICATION: Abnormal CT scan; C34.2-Malignant neoplasm of middle lobe, bronchus or lung.  TECHNIQUE: Fasting blood glucose 96 mg/dL with radiopharmaceutical injection 12.42 mCi of F-18 FDG injected into a right AC vein with the patient imaged after an appropriate amount of time. CT datasets performed for fusion analysis alone and should not be used for diagnostic purposes as these are low-dose protocol.  The radiation dose reduction device was turned on as low as reasonably achievable for each scan per ALARA protocol.  COMPARISON: CT chest, abdomen and pelvis 05/21/2021, PET/CT dated 11/10/2020.  FINDINGS:  HEAD AND NECK: Grossly symmetric appearance of cerebral hemispheres on limited intracranial evaluation. Physiologic variant activity within the extraocular muscles, muscles of phonation and tonsils. Continued asymmetric activity in the right thyroid lobe as seen on prior.  CHEST: Physiologic activity within the left ventricle myocardium. Lung parenchyma without abnormal hypermetabolism or abnormal hypermetabolism in the chest wall.  ABDOMEN AND PELVIS: Physiologic activity within the liver, spleen, renal collecting systems and GI tract with recently identified medial limb enlargement and nodular focus of the left adrenal gland demonstrating abnormal hypermetabolism on the current exam, new from prior PET/CT, measuring 5.7 concerning for metastasis without  separate abnormal hypermetabolism in the abdomen and pelvis. Osseous structures and proximal thighs unremarkable.      Impression: Area of concern on recent CT nodular density involving the left adrenal gland medial limb concerning for adrenal metastasis given abnormal hypermetabolism, new from prior PET/CT, without separate site of abnormal hypermetabolism on the current exam.  D:  07/06/2021 E:  07/06/2021     This report was finalized on 7/6/2021 4:52 PM by Dr. Stevie Beck.        ASSESSMENT: The patient is a very pleasant 85 y.o. female  with  right middle lobe lung cancer    PROBLEM LIST:   1. Right middle lobe lung adenocarcinoma, [id number] stage IIA:  A. Presented with abnormal chest x-ray  B. Ct chest showed 4.3 cm right middle lobe lung cancer.  C. Ct guided biopsy done 10/25/19 showed moderately differentiated adenocarcinoma  D.  MRI brain whole-body PET scan done on November 2019 did not show any evidence of metastatic disease  2.  Coronary artery disease  3.  Hypertension  4.  Peripheral neuropathy  5. Multiple myeloma:  A.  Treated on clinical trial at the [facility] using regimen containing thalidomide.  B.  Has been remission for the last 10 years    PLAN:  1.  I reviewed the PET scan results with the patient and her son.  I reviewed the images myself.  My interpretation is: Left adrenal nodule that is new from before with SUV 5.7.  2.  I gave the patient 2 options either surgical excision versus definitive CyberKnife.  The patient is not interested in surgery.  3.  I discussed the case Dr. Silverman over the phone to coordinate patient care.  Dr. Silverman is was happy to see her today however the patient would like to wait until she is back from her vacation which is not until mid August.  Unfortunately that would delay her treatment but she is comfortable with this plan.  4.  She will follow with me in 4 months with repeated CT scans.  5. She will continue Plavix and aspirin as well as Lipitor  secondary to history of CVA.         Andrzej Mirza MD  7/9/2021

## 2021-07-14 PROBLEM — C79.72 MALIGNANT NEOPLASM METASTATIC TO LEFT ADRENAL GLAND: Status: ACTIVE | Noted: 2021-01-01

## 2021-07-14 NOTE — PROGRESS NOTES
CONSULTATION NOTE      :                                                          1935  DATE OF CONSULTATION:                       2021   REQUESTING PHYSICIAN:                   Andrzej Mirza MD  REASON FOR CONSULTATION:           Primary cancer of right middle lobe of lung (CMS/HCC)  - Stage IIA (cT2b, cN0, cM0)         BRIEF HISTORY:  The patient is a very pleasant 85 y.o. female  with a new solitary left adrenal gland metastasis.  She has a history of medically inoperable non-small cell carcinoma, adenocarcinoma, of the right middle lobe of the lung treated with stereotactic body radiotherapy 2019.  She tolerated the treatment well with no significant difficulty.  She has had an excellent response with continued intrathoracic tumor control.  Recent surveillance CT imaging of abdomen and pelvis showed solitary new enlarging tumor involving the medial limb of the left adrenal gland increasing from 2.2 x 0.6 cm in 2020 to 3.3 x 1.6 cm on 2021.  PET/CT 2021 shows a left adrenal tumor to be a solitary focus of hypermetabolism, SUV 5.7, consistent with metastasis.  There was no abnormal hypermetabolism within the thorax or elsewhere.  Patient was referred back for consideration of stereotactic body radiotherapy to this new site of malignancy.    Allergy: No Known Allergies    Social History:   Social History     Socioeconomic History   • Marital status:      Spouse name: Not on file   • Number of children: Not on file   • Years of education: Not on file   • Highest education level: Not on file   Tobacco Use   • Smoking status: Former Smoker     Quit date: 1980     Years since quittin.5   • Smokeless tobacco: Never Used   Substance and Sexual Activity   • Alcohol use: No   • Drug use: No   • Sexual activity: Defer       Past Medical History:   Past Medical History:   Diagnosis Date   • GERD (gastroesophageal reflux disease)    • H/O multiple myeloma    • Lung cancer  "(CMS/HCC)    • Primary adenocarcinoma of middle lobe of right lung (CMS/HCC)    • Stroke (CMS/HCC) 2014       Family History: family history includes Cancer in her father; Melanoma in her brother; Stroke in her mother.     Surgical History:   Past Surgical History:   Procedure Laterality Date   • CYBERKNIFE  12/23/2019    Right lung mass   • LUNG BIOPSY     • MULTIPLE TOOTH EXTRACTIONS          Review of Systems:   Review of Systems   Constitutional: Positive for fatigue.   Cardiovascular: Positive for leg swelling.   Musculoskeletal: Positive for back pain.           Objective   VITAL SIGNS:   Vitals:    07/14/21 1308   BP: 155/72   Pulse: 75   Resp: 16   Temp: 97.6 °F (36.4 °C)   SpO2: 93%  Comment: RA   Weight: 83.2 kg (183 lb 6.4 oz)   Height: 165.1 cm (65\")   PainSc: 0-No pain        No data recorded    Physical Exam:   Physical Exam  Vitals and nursing note reviewed.   Constitutional:       Appearance: She is well-developed.   HENT:      Head: Normocephalic and atraumatic.   Cardiovascular:      Rate and Rhythm: Normal rate and regular rhythm.      Heart sounds: Normal heart sounds. No murmur heard.     Pulmonary:      Effort: Pulmonary effort is normal.      Breath sounds: Normal breath sounds. No wheezing or rales.   Abdominal:      General: Bowel sounds are normal. There is no distension.      Palpations: Abdomen is soft.      Tenderness: There is no abdominal tenderness.   Musculoskeletal:         General: No tenderness. Normal range of motion.      Cervical back: Normal range of motion and neck supple.   Lymphadenopathy:      Cervical: No cervical adenopathy.      Upper Body:      Right upper body: No supraclavicular adenopathy.      Left upper body: No supraclavicular adenopathy.   Skin:     General: Skin is warm and dry.   Neurological:      Mental Status: She is alert and oriented to person, place, and time.      Sensory: No sensory deficit.   Psychiatric:         Behavior: Behavior normal.         " Thought Content: Thought content normal.         Judgment: Judgment normal.              The following portions of the patient's history were reviewed and updated as appropriate: allergies, current medications, past family history, past medical history, past social history, past surgical history and problem list.    Assessment:   Assessment      Solitary new hypermetabolic left adrenal gland tumor in a patient with history of treated adenocarcinoma of the right lung.  This is radiographically consistent with oligometastatic lung cancer.  Patient is otherwise doing well with good performance status in spite of her age of 85 years.  Stereotactic body radiotherapy would be her most appropriate treatment for this situation.  The CyberKnife treatment procedure has been reviewed with patient and son.  Gold seed fiducial placement will be required.  Informed consent was obtained.    RECOMMENDATIONS: Patient will be scheduled for percutaneous CT-guided placement of gold seed fiducials.  She has a trip planned for next week.  Fiducial placement should occur on or around August 9, 2021.  Approximately 5 to 7 days later she should return for treatment planning CT.  The left adrenal tumor will likely receive a dose of approximately 40-48 Olson delivered in 4 daily fractions.  Pretreatment antiemetic with Zofran 8 mg p.o. 1 hour prior to each CyberKnife session.    Follow Up:   Return in about 26 days (around 8/9/2021) for Procedure.  Diagnoses and all orders for this visit:    1. Primary cancer of right middle lobe of lung (CMS/HCC) (Primary)  -     CT Guidance For Fiducial Placement; Future    2. 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  Dispense: 10 tablet; Refill: 0         Juan A Briseno MD

## 2021-07-15 NOTE — TELEPHONE ENCOUNTER
Pt's son, Doron, called asking to talk dates and times.  Called pt back and explained that scheduling, schedules the CT guided fiducial placement.    Then after that is scheduled we will schedule the ct simulation.  Gave him the number to scheduling.  Pt verbalized understanding.

## 2021-07-16 NOTE — TELEPHONE ENCOUNTER
Pt's son, Doron, called stating he got fiducial placement on 8/10/21.  Given a re-eval appointment with Dr. Briseno on 8/18/2021 @ 0900 and CT simulation @ 1000.  Doron verbalized understanding.

## 2021-08-10 NOTE — PRE-PROCEDURE NOTE
An immediate patient assessment was done prior to the administration of moderate and/or deep conscious sedation.      Progress Note    Antonette O Alvarez      Pre-op Diagnosis:   Adrenal nodule  Post-op diagnosis:  same        Anesthesia: * No anesthesia type entered *     ASA SCALE ASSESSMENT:  3-Severe systemic disease that results in functional limitation    MALLAMPATI CLASSIFICATION:  2-Able to visualize the soft palate, fauces, uvula. The anterior & posterior tonsilar pillars are hidden by the tongue.    Staff:   Liam Holden Jr., MD       Estimated Blood Loss: none    Urine Voided: * No values recorded between 8/10/2021 12:00 AM and 8/10/2021 12:30 PM *    Specimens:                None-procedure cancelled      Drains: * No LDAs found *      Findings: unable to perform procedure due to increase risk of pneumothorax. Discussed with referring on placing markers in a different location. Patient will be rescheduled     Complications: none      Liam Holden Jr., MD    Vascular Interventional Radiology    Date: 08/10/21   Time: 12:30 PM EDT

## 2021-08-10 NOTE — TELEPHONE ENCOUNTER
I was notified by IR that they were unable to place fiducials.  Dr. Briseno spoke with Dr. Hodlen and scans will be reviewed to see it fiducials can be placed in the kidney in the near future.  All of this information relayed to son, Doron.  oDron verbalized understanding.

## 2021-08-10 NOTE — TELEPHONE ENCOUNTER
Contacted Dr. Briseno's office to inform him that Dr. Holden was unable to gain a safe window to place fiducials into the adrenal mass as ordered. Message left with Dr. Briseno's assistant Juani's voicemail with information and contact information if has any questions.

## 2021-08-10 NOTE — TELEPHONE ENCOUNTER
Reinforced that pt is to remain off of her Plavix and aspirin between now and then.  Son confirms that she has not taken them today and that he has removed them from her medication container. Confirmed arrival time of 0700 in the morning.

## 2021-08-10 NOTE — NURSING NOTE
Pt discharged from ir dept.  Dr. Holden was unable to place fiducials today.  Pt and son aware of situation and verbalized understanding. Pt transported to exit via wheelchair per RN.

## 2021-08-10 NOTE — NURSING NOTE
Pt brought to CT3 for fudicial placement.  After images were taken and reviewed by Dr Holden there was no safe window to place fiducials.  Pt returned to [facility] and Dr Holden to speak to family by telephone.

## 2021-08-11 NOTE — NURSING NOTE
Pt discharged from ir dept s/p placement of fiducials in kidney and adrenal gland in preparation for cyberknife.  Pt tolerated procedure without complications. Discharge instructions reviewed with patient and son.  Both verbalized understanding.  Pt transported to exit via wheelchair per CNA.

## 2021-08-11 NOTE — PROCEDURES
Vascular Interventional Radiology Procedure Note    Antonette Alvarez      Pre-op Diagnosis:   Left adrenal mass       Post-Op Diagnosis Codes:   same    Procedure/CPT® Codes:        * No procedures listed *    * No surgeons listed *    Anesthesia: * No anesthesia type entered *    Staff:   * No surgical staff found *         Estimated Blood Loss: none    Urine Voided: * No values recorded between 8/11/2021 12:00 AM and 8/11/2021  9:51 AM *    Specimens:                None          Drains: * No LDAs found *    Findings: placed 2 fiducial markers in (Sup left kidney and adrenal) per physician request    Complications: none          Liam Holden Jr., MD   Vascular Interventional Radiology    Date: 8/11/2021  Time: 09:51 EDT

## 2021-08-11 NOTE — NURSING NOTE
CT guided gold seed fiducials x 2 placed by Dr Hloden, one placed to superior pole of left kidney and one to left adrenal.  Pt tolerated well.  Report called to [facility].

## 2021-08-18 NOTE — PROGRESS NOTES
RE-EVALUATION    PATIENT:                                                      Antonette Alvarez  :                                                          1935  DATE:                          2021   DIAGNOSIS:     Left adrenal metastasis  Primary cancer of right middle lobe of lung (CMS/HCC)  - Stage IIA (cT2b, cN0, cM0)       BRIEF HISTORY:  The patient is a very pleasant 85 y.o. female  with oligometastatic lung cancer.  She is found to have a new left adrenal tumor radiographically consistent solitary metastasis.  She is planned to undergo stereotactic body radiotherapy.  Gold seed fiducial placement was successful on second attempt with placement of markers in the lower pole of the adrenal gland and kidney.  She tolerated this procedure very well.  No current change in voiding, hematuria, flank pain, fevers or change in breathing.  She returns today for simulation.  Informed consent was obtained on 2021 and she remains a good candidate for stereotactic body radiotherapy.    No Known Allergies    Review of Systems   All other systems reviewed and are negative.          Objective   VITAL SIGNS:   Vitals:    21 0902   BP: 127/64   Pulse: 83   Resp: 20   Temp: 97.7 °F (36.5 °C)   TempSrc: Temporal   SpO2: 93%   Weight: 82.3 kg (181 lb 6.4 oz)   PainSc: 0-No pain        KPS 80%    Physical Exam  Vitals and nursing note reviewed.   Constitutional:       Appearance: She is well-developed.   HENT:      Head: Normocephalic and atraumatic.   Cardiovascular:      Rate and Rhythm: Normal rate and regular rhythm.      Heart sounds: Normal heart sounds. No murmur heard.     Pulmonary:      Effort: Pulmonary effort is normal.      Breath sounds: Normal breath sounds. No wheezing or rales.   Abdominal:      General: Bowel sounds are normal. There is no distension.      Palpations: Abdomen is soft.      Tenderness: There is no abdominal tenderness.   Musculoskeletal:         General: No tenderness. Normal  range of motion.      Cervical back: Normal range of motion and neck supple.   Lymphadenopathy:      Cervical: No cervical adenopathy.      Upper Body:      Right upper body: No supraclavicular adenopathy.      Left upper body: No supraclavicular adenopathy.   Skin:     General: Skin is warm and dry.   Neurological:      Mental Status: She is alert and oriented to person, place, and time.      Sensory: No sensory deficit.   Psychiatric:         Behavior: Behavior normal.         Thought Content: Thought content normal.         Judgment: Judgment normal.              The following portions of the patient's history were reviewed and updated as appropriate: allergies, current medications, past family history, past medical history, past social history, past surgical history and problem list.    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  This is radiographically consistent with oligometastatic lung cancer.  She tolerated gold seed fiducial placement well.    RECOMMENDATIONS: CT simulation today.  The left adrenal tumor will receive a dose of 32-48 Gray delivered and 4 daily fractions on the CyberKnife.  Patient has antiemetic premedication and will come to the treatment fasting for at least 3 hours.  She may continue to take all regular medications.  Patient and family request treatment to be completed by the end of the month, prior to change in insurance coverage, if possible.       Juan A Briseno MD   none

## 2021-10-04 NOTE — PROGRESS NOTES
TELEMEDICINE FOLLOW UP NOTE    PATIENT:                                                      Antonette Alvarez  MEDICAL RECORD #:                        [id number]  :                                                          1935  COMPLETION DATE:   2021  DIAGNOSIS:     Left adrenal metastasis  Primary cancer of right middle lobe of lung (HCC)  - Stage IIA (cT2b, cN0, cM0)    This visit has been rescheduled as a phone visit to comply with patient safety concerns in accordance with CDC recommendations in light of the COVID-19 pandemic and at the request of the patient. Total time of discussion was 16 minutes.  Verbal consent given.    COVID-19 RISK SCREEN     1. Has the patient had close contact without PPE with a lab confirmed COVID-19 (+) person or a person under investigation (PUI) for COVID-19 infection?  -- No     2. Has the patient had respiratory symptoms, worsened/new cough and/or SOA, unexplained fever, or sudden loss of smell and/or taste in the past 7 days? --  No    3. Does the patient have baseline higher exposure risk such as working in healthcare field or currently residing in healthcare facility?  --  No         BRIEF HISTORY:    Initial follow-up visit for oligometastatic lung cancer, previously treated with stereotactic body radiotherapy in 2019 for a right middle lobe primary lung lesion.  She has had an excellent response with continued intrathoracic tumor control.  Recent surveillance CT imaging and subsequent PET/CT scan show new hypermetabolic left adrenal gland tumor radiographically consistent with solitary metastasis.  She underwent CyberKnife stereotactic body radiotherapy to a dose of 40 Gy in 4 fractions delivered to the isolated left adrenal tumor.  She tolerated treatment well.  She developed mild posttreatment fatigue, which subsided after a couple of weeks.  She otherwise denies nausea, abdominal or flank pain, change in voiding, hematuria, or other acute concerns  today.  No new focal pains.  Breathing remains stable.  Appetite is good.           MEDICATIONS: Medication reconciliation for the patient was reviewed and confirmed in the electronic medical record.    Review of Systems   All other systems reviewed and are negative.          KPS:  80%    Physical Exam  Pulmonary:      Respirations even, unlabored. No audible wheezing or cough.  Neurological:      A+Ox4, conversant, answers questions appropriately.  Psychiatric:     Judgement, affect, and decision-making WNL.    Limited physical exam as visit was conducted remotely via telephone in light of the COVID-19 pandemic.        The following portions of the patient's history were reviewed and updated as appropriate: allergies, current medications, past family history, past medical history, past social history, past surgical history and problem list.         Diagnoses and all orders for this visit:    1. 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  This is radiographically consistent with oligometastatic lung cancer.  She is now 1 month status post CyberKnife SBRT to the left adrenal metastasis.  She tolerated treatment well and did not develop significant acute radiation-related toxicities.  She has not yet undergone repeat imaging to determine response to treatment.  CT scans of the chest, abdomen, and pelvis have been ordered per medical oncology and will be due in early December 2021.  We reviewed follow-up intervals, surveillance imaging, and expectations for response to treatment.      RECOMMENDATIONS:   Antonette Schreiber continues routine oncologic surveillance under the care of Dr. Mirza, with follow-up scheduled 12/3/2021 and repeat imaging studies prior.  I will schedule her to return to our clinic in approximately 6 months, or certainly sooner as  indicated on imaging.    Return in about 6 months (around 4/4/2022) for Office Visit.    Osmel Flores, APRN

## 2021-12-03 NOTE — PROGRESS NOTES
DATE OF VISIT: 12/3/2021    REASON FOR VISIT: Followup for right middle lobe non-small cell lung cancer     HISTORY OF PRESENT ILLNESS: The patient is a very pleasant 86 y.o. female  with past medical history significant for right lung cancer.  Patient was treated with CyberKnife radiation treatment January 2020. The patient is here today for a follow-up visit.    SUBJECTIVE: The patient is here today with her son.  She is doing fairly well.  She was able to tolerate her CyberKnife without any serious side effects.    PAST MEDICAL HISTORY/SOCIAL HISTORY/FAMILY HISTORY: Reviewed by me and unchanged from my documentation done on 12/03/21.    Review of Systems   Constitutional: Negative for activity change, appetite change, chills, fatigue, fever and unexpected weight change.        Weight gain   HENT: Negative for hearing loss, mouth sores, nosebleeds, sore throat and trouble swallowing.    Eyes: Negative for visual disturbance.   Respiratory: Negative for cough, chest tightness, shortness of breath and wheezing.    Cardiovascular: Negative for chest pain, palpitations and leg swelling.   Gastrointestinal: Negative for abdominal distention, abdominal pain, blood in stool, constipation, diarrhea, nausea, rectal pain and vomiting.   Endocrine: Negative for cold intolerance and heat intolerance.   Genitourinary: Negative for difficulty urinating, dysuria, frequency and urgency.   Musculoskeletal: Positive for gait problem. Negative for arthralgias, back pain, joint swelling and myalgias.        Weakness to left leg   Skin: Negative for rash.   Neurological: Negative for dizziness, tremors, syncope, weakness, light-headedness, numbness and headaches.        Mild expressive aphasia   Hematological: Negative for adenopathy. Does not bruise/bleed easily.   Psychiatric/Behavioral: Negative for confusion, sleep disturbance and suicidal ideas. The patient is not nervous/anxious.          Current Outpatient Medications:   •   "alendronate (FOSAMAX) 70 MG tablet, TAKE 1 TABLET BY MOUTH ONE TIME A WEEK, Disp: , Rfl:   •  aspirin 81 MG tablet, Take 81 mg by mouth., Disp: , Rfl:   •  atorvastatin (LIPITOR) 10 MG tablet, Take 10 mg by mouth., Disp: , Rfl:   •  citalopram (CeleXA) 20 MG tablet, Take 20 mg by mouth., Disp: , Rfl:   •  clopidogrel (PLAVIX) 75 MG tablet, Take 75 mg by mouth., Disp: , Rfl:   •  gabapentin (NEURONTIN) 300 MG capsule, TAKE 1 CAPSULE BY MOUTH THREE TIMES A DAY FOR NEUROPATHY, Disp: , Rfl:   •  HM OMEGA-3-6-9 FATTY ACIDS capsule, Take 1,200 mg by mouth., Disp: , Rfl:   •  lisinopril (PRINIVIL,ZESTRIL) 5 MG tablet, Take 5 mg by mouth Daily. for blood pressure., Disp: , Rfl:   •  melatonin 5 MG tablet tablet, Take 5 mg by mouth At Night As Needed., Disp: , Rfl:   •  ondansetron ODT (Zofran ODT) 8 MG disintegrating tablet, Place 1 tablet on the tongue Every 8 (Eight) Hours As Needed for Nausea or Vomiting., Disp: 10 tablet, Rfl: 0  •  prednisoLONE acetate (PRED FORTE) 1 % ophthalmic suspension, INSTILL 1 DROP INTO THE AFFECTED EYE(S) 4 TIMES DAILY FOR 4 DAYS THEN TWICE DAILY FOR 4 DAYS. START AFTER YAG, Disp: , Rfl:   •  RABEprazole (ACIPHEX) 20 MG EC tablet, Take 20 mg by mouth., Disp: , Rfl:   •  tolterodine LA (DETROL LA) 4 MG 24 hr capsule, TAKE 1 CAPSULE BY MOUTH AT BEDTIME FOR BLADDER, Disp: , Rfl:     PHYSICAL EXAMINATION:   BP (!) 187/79   Pulse 87   Temp 97.5 °F (36.4 °C) (Temporal)   Resp 18   Ht 167.6 cm (65.98\")   Wt 81.6 kg (180 lb)   SpO2 93%   BMI 29.07 kg/m²    Pain Score    12/03/21 1441   PainSc: 0-No pain       ECOG Performance Status: 1 - Symptomatic but completely ambulatory  General Appearance:  alert, cooperative, no apparent distress and appears stated age   Neurologic/Psychiatric: A&O x 3, slight limp, but gain steady, appropriate affect.   HEENT:  Normocephalic, without obvious abnormality, mucous membranes moist   Neck: Supple, symmetrical, trachea midline, no adenopathy;  No thyromegaly, " masses, or tenderness   Lungs:   Clear to auscultation bilaterally; respirations regular, even, and unlabored bilaterally   Heart:  Regular rate and rhythm, no murmurs appreciated   Abdomen:      Lymph nodes: No cervical, supraclavicular, inguinal or axillary adenopathy noted   Extremities: Normal, atraumatic; no clubbing, cyanosis, or edema    Skin: No rashes, ulcers, or suspicious lesions noted     Hospital Outpatient Visit on 11/29/2021   Component Date Value Ref Range Status   • Creatinine 11/29/2021 0.70  0.60 - 1.30 mg/dL Final    Serial Number: [id number]:  [id number]        CT Chest With Contrast Diagnostic, CT Abdomen Pelvis With Contrast    Result Date: 11/30/2021  Narrative: EXAMINATION: CT CHEST W CONTRAST DIAGNOSTIC-, CT ABDOMEN AND PELVIS W CONTRAST-  INDICATION: Followup scans; C34.2-Malignant neoplasm of middle lobe, bronchus or lung.  TECHNIQUE: CT chest, abdomen and pelvis with intravenous contrast administration.  The radiation dose reduction device was turned on for each scan per the ALARA (As Low as Reasonably Achievable) protocol.  COMPARISON: CT dated 05/21/2021.  FINDINGS: CHEST: Thyroid is heterogeneous with right thyroid nodule containing coarsened calcifications of the periphery stable from prior. No bulky mediastinal adenopathy. Central airways are patent. Esophagus seen in normal course to the distal segment where there is a moderate to large hiatal hernia. Atherosclerotic nonaneurysmal thoracic aorta. Central pulmonary arteries are grossly patent. Cardiac size enlarged with four-chamber cardiomegaly without pericardial effusion. Extended lung windows reveal biapical pleural-parenchymal scarring greatest in the left upper lung anteriorly. No dominant nodule or mass with opacifications in the right midlung also noted as seen on prior without interval change. No pleural effusion. Degenerative changes of the thoracic spine without aggressive osseous lesion. No soft tissue body wall findings  of acuity.  ABDOMEN AND PELVIS: Liver has low-attenuation areas of hepatic cysts or hemangiomas unchanged from prior without new dominant lesion. Gallbladder is unremarkable. No biliary dilatation. Pancreas and spleen are unremarkable. Adrenals demonstrate fiducial placement adjacent to the left adrenal gland with resolved left adrenal mass from prior. Kidneys demonstrate bilateral renal cortical cysts. No hydronephrosis or hydroureter. No bulky retroperitoneal adenopathy. Atherosclerotic nonaneurysmal patent abdominal aorta. Portal veins and IVC are patent. GI tract evaluation without focal thickening or distal portion dilatation of bowel. Moderate colonic stool burden. No free fluid or intraabdominal free air. Pelvic viscera unremarkable without bulky pelvic adenopathy or free fluid. Degenerative changes of the spine and pelvis without aggressive osseous lesion stable degenerative changes and compression deformity at the L2 level similar to prior. No soft tissue body wall findings of acuity.      Impression: 1. Grossly stable appearance of the lungs with right midlung scarring and patchy opacifications without new nodule or mass to suggest progression or new lesion. 2. Left adrenal gland significantly reduced in overall size resolved mass or involvement from prior comparison without new nodule and adjacent fiducial placement in the interim along the lateral inferior margin of the left adrenal gland. 3. No progressive metastatic disease within the abdomen or pelvis.  D:  11/29/2021 E:  11/30/2021   This report was finalized on 11/30/2021 8:47 AM by Dr. Stevie Beck.        ASSESSMENT: The patient is a very pleasant 86 y.o. female  with  right middle lobe lung cancer    PROBLEM LIST:   1. Right middle lobe lung adenocarcinoma, [id number] stage IIA:  A. Presented with abnormal chest x-ray  B. Ct chest showed 4.3 cm right middle lobe lung cancer.  C. Ct guided biopsy done 10/25/19 showed moderately differentiated  adenocarcinoma  D.  MRI brain whole-body PET scan done on November 2019 did not show any evidence of metastatic disease  E.  Isolated left adrenal recurrence: Status post CyberKnife radiotherapy done end of August 2021  2.  Coronary artery disease  3.  Hypertension  4.  Peripheral neuropathy  5. Multiple myeloma:  A.  Treated on clinical trial at the [facility] using regimen containing thalidomide.  B.  Has been remission for the last 10 years    PLAN:  1.  I reviewed the CT scan results with the patient and her son.  I reviewed the images myself.  My interpretation is: Significant decrease in size of the left adrenal nodule.  No evidence of new or progressive disease.  2.  I we will continue watchful waiting at this point but  3.  She will follow with me in 4 months with repeated CT scans.  4. She will continue Plavix and aspirin as well as Lipitor secondary to history of CVA.     Andrzej Mirza MD  12/3/2021

## 2022-01-01 ENCOUNTER — APPOINTMENT (OUTPATIENT)
Dept: CT IMAGING | Facility: HOSPITAL | Age: 87
End: 2022-01-01

## 2022-01-01 ENCOUNTER — OFFICE VISIT (OUTPATIENT)
Dept: ONCOLOGY | Facility: CLINIC | Age: 87
End: 2022-01-01

## 2022-01-01 ENCOUNTER — APPOINTMENT (OUTPATIENT)
Dept: GENERAL RADIOLOGY | Facility: HOSPITAL | Age: 87
End: 2022-01-01

## 2022-01-01 ENCOUNTER — APPOINTMENT (OUTPATIENT)
Dept: ULTRASOUND IMAGING | Facility: HOSPITAL | Age: 87
End: 2022-01-01

## 2022-01-01 ENCOUNTER — HOSPITAL ENCOUNTER (OUTPATIENT)
Dept: PET IMAGING | Facility: HOSPITAL | Age: 87
Discharge: HOME | End: 2022-03-29

## 2022-01-01 ENCOUNTER — LAB (OUTPATIENT)
Dept: LAB | Facility: HOSPITAL | Age: 87
End: 2022-01-01

## 2022-01-01 ENCOUNTER — HOSPITAL ENCOUNTER (EMERGENCY)
Facility: HOSPITAL | Age: 87
Discharge: HOME | End: 2022-04-01
Attending: EMERGENCY MEDICINE | Admitting: EMERGENCY MEDICINE

## 2022-01-01 ENCOUNTER — APPOINTMENT (OUTPATIENT)
Dept: CARDIOLOGY | Facility: HOSPITAL | Age: 87
End: 2022-01-01

## 2022-01-01 ENCOUNTER — TELEPHONE (OUTPATIENT)
Dept: ONCOLOGY | Facility: CLINIC | Age: 87
End: 2022-01-01

## 2022-01-01 ENCOUNTER — HOSPITAL ENCOUNTER (INPATIENT)
Facility: HOSPITAL | Age: 87
LOS: 1 days | End: 2022-05-04
Attending: INTERNAL MEDICINE | Admitting: INTERNAL MEDICINE

## 2022-01-01 ENCOUNTER — HOSPITAL ENCOUNTER (INPATIENT)
Facility: HOSPITAL | Age: 87
LOS: 15 days | Discharge: HOSPICE | End: 2022-05-03
Attending: EMERGENCY MEDICINE | Admitting: FAMILY MEDICINE

## 2022-01-01 VITALS
HEIGHT: 65 IN | RESPIRATION RATE: 22 BRPM | DIASTOLIC BLOOD PRESSURE: 44 MMHG | WEIGHT: 192.1 LBS | BODY MASS INDEX: 32.01 KG/M2 | SYSTOLIC BLOOD PRESSURE: 100 MMHG | OXYGEN SATURATION: 94 % | TEMPERATURE: 98.1 F | HEART RATE: 73 BPM

## 2022-01-01 VITALS
DIASTOLIC BLOOD PRESSURE: 69 MMHG | TEMPERATURE: 96 F | RESPIRATION RATE: 18 BRPM | BODY MASS INDEX: 28.77 KG/M2 | OXYGEN SATURATION: 83 % | HEIGHT: 66 IN | SYSTOLIC BLOOD PRESSURE: 122 MMHG | HEART RATE: 79 BPM | WEIGHT: 179 LBS

## 2022-01-01 VITALS
BODY MASS INDEX: 29.32 KG/M2 | WEIGHT: 176 LBS | DIASTOLIC BLOOD PRESSURE: 62 MMHG | HEIGHT: 65 IN | TEMPERATURE: 98.1 F | SYSTOLIC BLOOD PRESSURE: 122 MMHG | HEART RATE: 79 BPM | RESPIRATION RATE: 20 BRPM | OXYGEN SATURATION: 96 %

## 2022-01-01 VITALS
SYSTOLIC BLOOD PRESSURE: 85 MMHG | DIASTOLIC BLOOD PRESSURE: 47 MMHG | TEMPERATURE: 99.2 F | RESPIRATION RATE: 24 BRPM | HEART RATE: 116 BPM | OXYGEN SATURATION: 88 %

## 2022-01-01 DIAGNOSIS — Z86.711 HX OF PULMONARY EMBOLUS: ICD-10-CM

## 2022-01-01 DIAGNOSIS — C34.2 PRIMARY CANCER OF RIGHT MIDDLE LOBE OF LUNG: Primary | ICD-10-CM

## 2022-01-01 DIAGNOSIS — Z79.01 CHRONIC ANTICOAGULATION: ICD-10-CM

## 2022-01-01 DIAGNOSIS — Z79.01 CURRENT USE OF LONG TERM ANTICOAGULATION: ICD-10-CM

## 2022-01-01 DIAGNOSIS — C34.2 PRIMARY CANCER OF RIGHT MIDDLE LOBE OF LUNG: ICD-10-CM

## 2022-01-01 DIAGNOSIS — R13.13 PHARYNGEAL DYSPHAGIA: ICD-10-CM

## 2022-01-01 DIAGNOSIS — S20.211A CONTUSION OF RIGHT CHEST WALL, INITIAL ENCOUNTER: ICD-10-CM

## 2022-01-01 DIAGNOSIS — R60.9 PERIPHERAL EDEMA: Primary | ICD-10-CM

## 2022-01-01 DIAGNOSIS — C79.72 MALIGNANT NEOPLASM METASTATIC TO LEFT ADRENAL GLAND: ICD-10-CM

## 2022-01-01 DIAGNOSIS — Z85.118 HISTORY OF LUNG CANCER: ICD-10-CM

## 2022-01-01 DIAGNOSIS — S09.90XA CLOSED HEAD INJURY, INITIAL ENCOUNTER: Primary | ICD-10-CM

## 2022-01-01 DIAGNOSIS — R07.81 RIB PAIN: ICD-10-CM

## 2022-01-01 DIAGNOSIS — Z86.79 HISTORY OF ATRIAL FIBRILLATION: ICD-10-CM

## 2022-01-01 LAB
ALBUMIN SERPL ELPH-MCNC: 2.4 G/DL (ref 2.9–4.4)
ALBUMIN SERPL ELPH-MCNC: 3.2 G/DL (ref 2.9–4.4)
ALBUMIN SERPL-MCNC: 2.5 G/DL (ref 3.5–5.2)
ALBUMIN SERPL-MCNC: 2.5 G/DL (ref 3.5–5.2)
ALBUMIN SERPL-MCNC: 3 G/DL (ref 3.5–5.2)
ALBUMIN SERPL-MCNC: 3.8 G/DL (ref 3.5–5.2)
ALBUMIN/GLOB SERPL: 0.9 {RATIO} (ref 0.7–1.7)
ALBUMIN/GLOB SERPL: 0.9 {RATIO} (ref 0.7–1.7)
ALBUMIN/GLOB SERPL: 1.1 G/DL
ALBUMIN/GLOB SERPL: 1.2 G/DL
ALBUMIN/GLOB SERPL: 1.3 G/DL
ALBUMIN/GLOB SERPL: 1.4 G/DL
ALP SERPL-CCNC: 77 U/L (ref 39–117)
ALP SERPL-CCNC: 85 U/L (ref 39–117)
ALP SERPL-CCNC: 85 U/L (ref 39–117)
ALP SERPL-CCNC: 94 U/L (ref 39–117)
ALPHA1 GLOB SERPL ELPH-MCNC: 0.3 G/DL (ref 0–0.4)
ALPHA1 GLOB SERPL ELPH-MCNC: 0.4 G/DL (ref 0–0.4)
ALPHA2 GLOB SERPL ELPH-MCNC: 0.8 G/DL (ref 0.4–1)
ALPHA2 GLOB SERPL ELPH-MCNC: 0.9 G/DL (ref 0.4–1)
ALT SERPL W P-5'-P-CCNC: 10 U/L (ref 1–33)
ALT SERPL W P-5'-P-CCNC: 12 U/L (ref 1–33)
ALT SERPL W P-5'-P-CCNC: 6 U/L (ref 1–33)
ALT SERPL W P-5'-P-CCNC: 7 U/L (ref 1–33)
ANION GAP SERPL CALCULATED.3IONS-SCNC: 12 MMOL/L (ref 5–15)
ANION GAP SERPL CALCULATED.3IONS-SCNC: 3 MMOL/L (ref 5–15)
ANION GAP SERPL CALCULATED.3IONS-SCNC: 4 MMOL/L (ref 5–15)
ANION GAP SERPL CALCULATED.3IONS-SCNC: 5 MMOL/L (ref 5–15)
ANION GAP SERPL CALCULATED.3IONS-SCNC: 5 MMOL/L (ref 5–15)
ANION GAP SERPL CALCULATED.3IONS-SCNC: 6 MMOL/L (ref 5–15)
ANION GAP SERPL CALCULATED.3IONS-SCNC: 9 MMOL/L (ref 5–15)
ANION GAP SERPL CALCULATED.3IONS-SCNC: 9 MMOL/L (ref 5–15)
APPEARANCE FLD: CLEAR
AST SERPL-CCNC: 10 U/L (ref 1–32)
AST SERPL-CCNC: 10 U/L (ref 1–32)
AST SERPL-CCNC: 13 U/L (ref 1–32)
AST SERPL-CCNC: 14 U/L (ref 1–32)
B-GLOBULIN SERPL ELPH-MCNC: 1 G/DL (ref 0.7–1.3)
B-GLOBULIN SERPL ELPH-MCNC: 1.1 G/DL (ref 0.7–1.3)
BACTERIA FLD CULT: NORMAL
BACTERIA SPEC AEROBE CULT: NORMAL
BACTERIA SPEC AEROBE CULT: NORMAL
BACTERIA UR QL AUTO: ABNORMAL /HPF
BACTERIA UR QL AUTO: NORMAL /HPF
BASOPHILS # BLD AUTO: 0.03 10*3/MM3 (ref 0–0.2)
BASOPHILS # BLD AUTO: 0.04 10*3/MM3 (ref 0–0.2)
BASOPHILS # BLD AUTO: 0.05 10*3/MM3 (ref 0–0.2)
BASOPHILS # BLD AUTO: 0.06 10*3/MM3 (ref 0–0.2)
BASOPHILS NFR BLD AUTO: 0.2 % (ref 0–1.5)
BASOPHILS NFR BLD AUTO: 0.3 % (ref 0–1.5)
BASOPHILS NFR BLD AUTO: 0.3 % (ref 0–1.5)
BASOPHILS NFR BLD AUTO: 0.4 % (ref 0–1.5)
BH CV ECHO MEAS - AO MAX PG (FULL): 2.2 MMHG
BH CV ECHO MEAS - AO MAX PG: 8.1 MMHG
BH CV ECHO MEAS - AO MEAN PG (FULL): 0.75 MMHG
BH CV ECHO MEAS - AO MEAN PG: 3.5 MMHG
BH CV ECHO MEAS - AO ROOT AREA (BSA CORRECTED): 1.8
BH CV ECHO MEAS - AO ROOT AREA: 9.2 CM^2
BH CV ECHO MEAS - AO ROOT DIAM: 3.4 CM
BH CV ECHO MEAS - AO V2 MAX: 142.5 CM/SEC
BH CV ECHO MEAS - AO V2 MEAN: 96.6 CM/SEC
BH CV ECHO MEAS - AO V2 VTI: 29.3 CM
BH CV ECHO MEAS - AVA(I,A): 2.5 CM^2
BH CV ECHO MEAS - AVA(I,D): 2.5 CM^2
BH CV ECHO MEAS - AVA(V,A): 2.7 CM^2
BH CV ECHO MEAS - AVA(V,D): 2.7 CM^2
BH CV ECHO MEAS - BSA(HAYCOCK): 2 M^2
BH CV ECHO MEAS - BSA: 1.9 M^2
BH CV ECHO MEAS - BZI_BMI: 30.3 KILOGRAMS/M^2
BH CV ECHO MEAS - BZI_METRIC_HEIGHT: 165.1 CM
BH CV ECHO MEAS - BZI_METRIC_WEIGHT: 82.6 KG
BH CV ECHO MEAS - EDV(CUBED): 131.4 ML
BH CV ECHO MEAS - EDV(MOD-SP2): 70 ML
BH CV ECHO MEAS - EDV(MOD-SP4): 76 ML
BH CV ECHO MEAS - EDV(TEICH): 122.9 ML
BH CV ECHO MEAS - EF(CUBED): 76.4 %
BH CV ECHO MEAS - EF(MOD-BP): 73 %
BH CV ECHO MEAS - EF(MOD-SP2): 74.3 %
BH CV ECHO MEAS - EF(MOD-SP4): 72.4 %
BH CV ECHO MEAS - EF(TEICH): 68.1 %
BH CV ECHO MEAS - ESV(CUBED): 31.1 ML
BH CV ECHO MEAS - ESV(MOD-SP2): 18 ML
BH CV ECHO MEAS - ESV(MOD-SP4): 21 ML
BH CV ECHO MEAS - ESV(TEICH): 39.2 ML
BH CV ECHO MEAS - FS: 38.2 %
BH CV ECHO MEAS - IVS/LVPW: 0.96
BH CV ECHO MEAS - IVSD: 1.3 CM
BH CV ECHO MEAS - LA DIMENSION: 3.8 CM
BH CV ECHO MEAS - LA/AO: 1.1
BH CV ECHO MEAS - LAD MAJOR: 6.8 CM
BH CV ECHO MEAS - LAT PEAK E' VEL: 8 CM/SEC
BH CV ECHO MEAS - LATERAL E/E' RATIO: 11.4
BH CV ECHO MEAS - LV DIASTOLIC VOL/BSA (35-75): 40 ML/M^2
BH CV ECHO MEAS - LV MASS(C)D: 264 GRAMS
BH CV ECHO MEAS - LV MASS(C)DI: 138.9 GRAMS/M^2
BH CV ECHO MEAS - LV MAX PG: 5.9 MMHG
BH CV ECHO MEAS - LV MEAN PG: 2.8 MMHG
BH CV ECHO MEAS - LV SYSTOLIC VOL/BSA (12-30): 11.1 ML/M^2
BH CV ECHO MEAS - LV V1 MAX: 121.1 CM/SEC
BH CV ECHO MEAS - LV V1 MEAN: 75 CM/SEC
BH CV ECHO MEAS - LV V1 VTI: 22.6 CM
BH CV ECHO MEAS - LVIDD: 5.1 CM
BH CV ECHO MEAS - LVIDS: 3.1 CM
BH CV ECHO MEAS - LVLD AP2: 6.6 CM
BH CV ECHO MEAS - LVLD AP4: 7.3 CM
BH CV ECHO MEAS - LVLS AP2: 4.6 CM
BH CV ECHO MEAS - LVLS AP4: 5.5 CM
BH CV ECHO MEAS - LVOT AREA (M): 3.1 CM^2
BH CV ECHO MEAS - LVOT AREA: 3.2 CM^2
BH CV ECHO MEAS - LVOT DIAM: 2 CM
BH CV ECHO MEAS - LVPWD: 1.3 CM
BH CV ECHO MEAS - MED PEAK E' VEL: 7.5 CM/SEC
BH CV ECHO MEAS - MEDIAL E/E' RATIO: 12.2
BH CV ECHO MEAS - MR MAX PG: 93 MMHG
BH CV ECHO MEAS - MR MAX VEL: 482.1 CM/SEC
BH CV ECHO MEAS - MR MEAN PG: 62.2 MMHG
BH CV ECHO MEAS - MR MEAN VEL: 367.9 CM/SEC
BH CV ECHO MEAS - MR VTI: 146.4 CM
BH CV ECHO MEAS - MV A MAX VEL: 73.5 CM/SEC
BH CV ECHO MEAS - MV DEC SLOPE: 303.7 CM/SEC^2
BH CV ECHO MEAS - MV DEC TIME: 0.23 SEC
BH CV ECHO MEAS - MV E MAX VEL: 92.8 CM/SEC
BH CV ECHO MEAS - MV E/A: 1.3
BH CV ECHO MEAS - MV P1/2T MAX VEL: 100.8 CM/SEC
BH CV ECHO MEAS - MV P1/2T: 97.2 MSEC
BH CV ECHO MEAS - MVA P1/2T LCG: 2.2 CM^2
BH CV ECHO MEAS - MVA(P1/2T): 2.3 CM^2
BH CV ECHO MEAS - PA ACC SLOPE: 540.7 CM/SEC^2
BH CV ECHO MEAS - PA ACC TIME: 0.12 SEC
BH CV ECHO MEAS - PA MAX PG: 3.6 MMHG
BH CV ECHO MEAS - PA PR(ACCEL): 23.5 MMHG
BH CV ECHO MEAS - PA V2 MAX: 95.1 CM/SEC
BH CV ECHO MEAS - PI END-D VEL: 51.3 CM/SEC
BH CV ECHO MEAS - RAP SYSTOLE: 8 MMHG
BH CV ECHO MEAS - RVSP: 39 MMHG
BH CV ECHO MEAS - SI(AO): 142.3 ML/M^2
BH CV ECHO MEAS - SI(CUBED): 52.8 ML/M^2
BH CV ECHO MEAS - SI(LVOT): 38.1 ML/M^2
BH CV ECHO MEAS - SI(MOD-SP2): 27.4 ML/M^2
BH CV ECHO MEAS - SI(MOD-SP4): 28.9 ML/M^2
BH CV ECHO MEAS - SI(TEICH): 44 ML/M^2
BH CV ECHO MEAS - SV(AO): 270.5 ML
BH CV ECHO MEAS - SV(CUBED): 100.3 ML
BH CV ECHO MEAS - SV(LVOT): 72.4 ML
BH CV ECHO MEAS - SV(MOD-SP2): 52 ML
BH CV ECHO MEAS - SV(MOD-SP4): 55 ML
BH CV ECHO MEAS - SV(TEICH): 83.7 ML
BH CV ECHO MEAS - TAPSE (>1.6): 2.5 CM
BH CV ECHO MEAS - TR MAX PG: 31 MMHG
BH CV ECHO MEAS - TR MAX VEL: 264.5 CM/SEC
BH CV ECHO MEASUREMENTS AVERAGE E/E' RATIO: 11.97
BH CV XLRA - RV BASE: 4.1 CM
BH CV XLRA - RV LENGTH: 7.9 CM
BH CV XLRA - RV MID: 2.9 CM
BILIRUB SERPL-MCNC: 0.3 MG/DL (ref 0–1.2)
BILIRUB SERPL-MCNC: 0.3 MG/DL (ref 0–1.2)
BILIRUB SERPL-MCNC: 0.4 MG/DL (ref 0–1.2)
BILIRUB SERPL-MCNC: 0.4 MG/DL (ref 0–1.2)
BILIRUB UR QL STRIP: NEGATIVE
BILIRUB UR QL STRIP: NEGATIVE
BUN SERPL-MCNC: 10 MG/DL (ref 8–23)
BUN SERPL-MCNC: 11 MG/DL (ref 8–23)
BUN SERPL-MCNC: 12 MG/DL (ref 8–23)
BUN SERPL-MCNC: 14 MG/DL (ref 8–23)
BUN SERPL-MCNC: 18 MG/DL (ref 8–23)
BUN SERPL-MCNC: 20 MG/DL (ref 8–23)
BUN SERPL-MCNC: 24 MG/DL (ref 8–23)
BUN SERPL-MCNC: 25 MG/DL (ref 8–23)
BUN SERPL-MCNC: 9 MG/DL (ref 8–23)
BUN/CREAT SERPL: 12.8 (ref 7–25)
BUN/CREAT SERPL: 14.9 (ref 7–25)
BUN/CREAT SERPL: 15.4 (ref 7–25)
BUN/CREAT SERPL: 17.2 (ref 7–25)
BUN/CREAT SERPL: 20.6 (ref 7–25)
BUN/CREAT SERPL: 20.7 (ref 7–25)
BUN/CREAT SERPL: 20.9 (ref 7–25)
BUN/CREAT SERPL: 23.4 (ref 7–25)
BUN/CREAT SERPL: 24 (ref 7–25)
BUN/CREAT SERPL: 25.8 (ref 7–25)
BUN/CREAT SERPL: 27.6 (ref 7–25)
BUN/CREAT SERPL: 28.6 (ref 7–25)
CALCIUM SPEC-SCNC: 8 MG/DL (ref 8.6–10.5)
CALCIUM SPEC-SCNC: 8 MG/DL (ref 8.6–10.5)
CALCIUM SPEC-SCNC: 8.1 MG/DL (ref 8.6–10.5)
CALCIUM SPEC-SCNC: 8.3 MG/DL (ref 8.6–10.5)
CALCIUM SPEC-SCNC: 8.6 MG/DL (ref 8.6–10.5)
CALCIUM SPEC-SCNC: 8.8 MG/DL (ref 8.6–10.5)
CALCIUM SPEC-SCNC: 8.9 MG/DL (ref 8.6–10.5)
CALCIUM SPEC-SCNC: 9.1 MG/DL (ref 8.6–10.5)
CALCIUM SPEC-SCNC: 9.1 MG/DL (ref 8.6–10.5)
CALCIUM SPEC-SCNC: 9.2 MG/DL (ref 8.6–10.5)
CALCIUM SPEC-SCNC: 9.4 MG/DL (ref 8.6–10.5)
CALCIUM SPEC-SCNC: 9.9 MG/DL (ref 8.6–10.5)
CHLORIDE SERPL-SCNC: 88 MMOL/L (ref 98–107)
CHLORIDE SERPL-SCNC: 88 MMOL/L (ref 98–107)
CHLORIDE SERPL-SCNC: 89 MMOL/L (ref 98–107)
CHLORIDE SERPL-SCNC: 90 MMOL/L (ref 98–107)
CHLORIDE SERPL-SCNC: 91 MMOL/L (ref 98–107)
CHLORIDE SERPL-SCNC: 91 MMOL/L (ref 98–107)
CHLORIDE SERPL-SCNC: 92 MMOL/L (ref 98–107)
CHLORIDE SERPL-SCNC: 92 MMOL/L (ref 98–107)
CHLORIDE SERPL-SCNC: 93 MMOL/L (ref 98–107)
CHLORIDE SERPL-SCNC: 93 MMOL/L (ref 98–107)
CHLORIDE SERPL-SCNC: 94 MMOL/L (ref 98–107)
CHLORIDE SERPL-SCNC: 99 MMOL/L (ref 98–107)
CLARITY UR: CLEAR
CLARITY UR: CLEAR
CO2 SERPL-SCNC: 32 MMOL/L (ref 22–29)
CO2 SERPL-SCNC: 33 MMOL/L (ref 22–29)
CO2 SERPL-SCNC: 33 MMOL/L (ref 22–29)
CO2 SERPL-SCNC: 34 MMOL/L (ref 22–29)
CO2 SERPL-SCNC: 34 MMOL/L (ref 22–29)
CO2 SERPL-SCNC: 35 MMOL/L (ref 22–29)
CO2 SERPL-SCNC: 36 MMOL/L (ref 22–29)
CO2 SERPL-SCNC: 37 MMOL/L (ref 22–29)
COLOR FLD: YELLOW
COLOR UR: YELLOW
COLOR UR: YELLOW
CREAT SERPL-MCNC: 0.43 MG/DL (ref 0.57–1)
CREAT SERPL-MCNC: 0.47 MG/DL (ref 0.57–1)
CREAT SERPL-MCNC: 0.58 MG/DL (ref 0.57–1)
CREAT SERPL-MCNC: 0.58 MG/DL (ref 0.57–1)
CREAT SERPL-MCNC: 0.65 MG/DL (ref 0.57–1)
CREAT SERPL-MCNC: 0.67 MG/DL (ref 0.57–1)
CREAT SERPL-MCNC: 0.68 MG/DL (ref 0.57–1)
CREAT SERPL-MCNC: 0.7 MG/DL (ref 0.57–1)
CREAT SERPL-MCNC: 0.75 MG/DL (ref 0.57–1)
CREAT SERPL-MCNC: 0.78 MG/DL (ref 0.57–1)
CREAT SERPL-MCNC: 0.87 MG/DL (ref 0.57–1)
CREAT SERPL-MCNC: 0.97 MG/DL (ref 0.57–1)
D-LACTATE SERPL-SCNC: 0.8 MMOL/L (ref 0.5–2)
D-LACTATE SERPL-SCNC: 2 MMOL/L (ref 0.5–2)
DEPRECATED RDW RBC AUTO: 47.8 FL (ref 37–54)
DEPRECATED RDW RBC AUTO: 49.2 FL (ref 37–54)
DEPRECATED RDW RBC AUTO: 49.8 FL (ref 37–54)
DEPRECATED RDW RBC AUTO: 50.1 FL (ref 37–54)
DEPRECATED RDW RBC AUTO: 50.4 FL (ref 37–54)
DEPRECATED RDW RBC AUTO: 50.7 FL (ref 37–54)
DEPRECATED RDW RBC AUTO: 50.8 FL (ref 37–54)
DEPRECATED RDW RBC AUTO: 50.9 FL (ref 37–54)
DEPRECATED RDW RBC AUTO: 51.5 FL (ref 37–54)
DEPRECATED RDW RBC AUTO: 52 FL (ref 37–54)
DEPRECATED RDW RBC AUTO: 54 FL (ref 37–54)
EGFRCR SERPLBLD CKD-EPI 2021: 57.4 ML/MIN/1.73
EGFRCR SERPLBLD CKD-EPI 2021: 65.4 ML/MIN/1.73
EGFRCR SERPLBLD CKD-EPI 2021: 74.5 ML/MIN/1.73
EGFRCR SERPLBLD CKD-EPI 2021: 78.1 ML/MIN/1.73
EGFRCR SERPLBLD CKD-EPI 2021: 84.9 ML/MIN/1.73
EGFRCR SERPLBLD CKD-EPI 2021: 85.5 ML/MIN/1.73
EGFRCR SERPLBLD CKD-EPI 2021: 85.8 ML/MIN/1.73
EGFRCR SERPLBLD CKD-EPI 2021: 86.4 ML/MIN/1.73
EGFRCR SERPLBLD CKD-EPI 2021: 88.8 ML/MIN/1.73
EGFRCR SERPLBLD CKD-EPI 2021: 88.8 ML/MIN/1.73
EGFRCR SERPLBLD CKD-EPI 2021: 93.4 ML/MIN/1.73
EGFRCR SERPLBLD CKD-EPI 2021: 95.5 ML/MIN/1.73
EOSINOPHIL # BLD AUTO: 0.01 10*3/MM3 (ref 0–0.4)
EOSINOPHIL # BLD AUTO: 0.09 10*3/MM3 (ref 0–0.4)
EOSINOPHIL # BLD AUTO: 0.12 10*3/MM3 (ref 0–0.4)
EOSINOPHIL # BLD AUTO: 0.18 10*3/MM3 (ref 0–0.4)
EOSINOPHIL NFR BLD AUTO: 0.1 % (ref 0.3–6.2)
EOSINOPHIL NFR BLD AUTO: 0.6 % (ref 0.3–6.2)
EOSINOPHIL NFR BLD AUTO: 0.7 % (ref 0.3–6.2)
EOSINOPHIL NFR BLD AUTO: 1.2 % (ref 0.3–6.2)
ERYTHROCYTE [DISTWIDTH] IN BLOOD BY AUTOMATED COUNT: 16 % (ref 12.3–15.4)
ERYTHROCYTE [DISTWIDTH] IN BLOOD BY AUTOMATED COUNT: 16 % (ref 12.3–15.4)
ERYTHROCYTE [DISTWIDTH] IN BLOOD BY AUTOMATED COUNT: 16.1 % (ref 12.3–15.4)
ERYTHROCYTE [DISTWIDTH] IN BLOOD BY AUTOMATED COUNT: 16.3 % (ref 12.3–15.4)
ERYTHROCYTE [DISTWIDTH] IN BLOOD BY AUTOMATED COUNT: 16.4 % (ref 12.3–15.4)
ERYTHROCYTE [DISTWIDTH] IN BLOOD BY AUTOMATED COUNT: 16.5 % (ref 12.3–15.4)
ERYTHROCYTE [DISTWIDTH] IN BLOOD BY AUTOMATED COUNT: 16.6 % (ref 12.3–15.4)
ERYTHROCYTE [DISTWIDTH] IN BLOOD BY AUTOMATED COUNT: 16.6 % (ref 12.3–15.4)
ERYTHROCYTE [DISTWIDTH] IN BLOOD BY AUTOMATED COUNT: 16.7 % (ref 12.3–15.4)
ERYTHROCYTE [DISTWIDTH] IN BLOOD BY AUTOMATED COUNT: 16.7 % (ref 12.3–15.4)
FERRITIN SERPL-MCNC: 182 NG/ML (ref 13–150)
FLUAV SUBTYP SPEC NAA+PROBE: NOT DETECTED
FLUBV RNA ISLT QL NAA+PROBE: NOT DETECTED
FOLATE SERPL-MCNC: 6.37 NG/ML (ref 4.78–24.2)
GAMMA GLOB SERPL ELPH-MCNC: 0.8 G/DL (ref 0.4–1.8)
GAMMA GLOB SERPL ELPH-MCNC: 1 G/DL (ref 0.4–1.8)
GLOBULIN SER CALC-MCNC: 3.4 G/DL (ref 2.2–3.9)
GLOBULIN SER-MCNC: 3 G/DL (ref 2.2–3.9)
GLOBULIN UR ELPH-MCNC: 1.8 GM/DL
GLOBULIN UR ELPH-MCNC: 2.2 GM/DL
GLOBULIN UR ELPH-MCNC: 2.3 GM/DL
GLOBULIN UR ELPH-MCNC: 3.2 GM/DL
GLUCOSE BLDC GLUCOMTR-MCNC: 95 MG/DL (ref 70–130)
GLUCOSE SERPL-MCNC: 108 MG/DL (ref 65–99)
GLUCOSE SERPL-MCNC: 109 MG/DL (ref 65–99)
GLUCOSE SERPL-MCNC: 119 MG/DL (ref 65–99)
GLUCOSE SERPL-MCNC: 122 MG/DL (ref 65–99)
GLUCOSE SERPL-MCNC: 123 MG/DL (ref 65–99)
GLUCOSE SERPL-MCNC: 125 MG/DL (ref 65–99)
GLUCOSE SERPL-MCNC: 126 MG/DL (ref 65–99)
GLUCOSE SERPL-MCNC: 134 MG/DL (ref 65–99)
GLUCOSE SERPL-MCNC: 135 MG/DL (ref 65–99)
GLUCOSE SERPL-MCNC: 145 MG/DL (ref 65–99)
GLUCOSE SERPL-MCNC: 178 MG/DL (ref 65–99)
GLUCOSE SERPL-MCNC: 96 MG/DL (ref 65–99)
GLUCOSE UR STRIP-MCNC: NEGATIVE MG/DL
GLUCOSE UR STRIP-MCNC: NEGATIVE MG/DL
GRAM STN SPEC: NORMAL
GRAM STN SPEC: NORMAL
HCT VFR BLD AUTO: 25.1 % (ref 34–46.6)
HCT VFR BLD AUTO: 25.1 % (ref 34–46.6)
HCT VFR BLD AUTO: 25.7 % (ref 34–46.6)
HCT VFR BLD AUTO: 26.3 % (ref 34–46.6)
HCT VFR BLD AUTO: 26.7 % (ref 34–46.6)
HCT VFR BLD AUTO: 27.4 % (ref 34–46.6)
HCT VFR BLD AUTO: 27.8 % (ref 34–46.6)
HCT VFR BLD AUTO: 27.9 % (ref 34–46.6)
HCT VFR BLD AUTO: 29 % (ref 34–46.6)
HCT VFR BLD AUTO: 29.9 % (ref 34–46.6)
HCT VFR BLD AUTO: 30.6 % (ref 34–46.6)
HCT VFR BLD AUTO: 31.8 % (ref 34–46.6)
HEMOCCULT STL QL: NEGATIVE
HGB BLD-MCNC: 10.1 G/DL (ref 12–15.9)
HGB BLD-MCNC: 10.3 G/DL (ref 12–15.9)
HGB BLD-MCNC: 8.5 G/DL (ref 12–15.9)
HGB BLD-MCNC: 8.6 G/DL (ref 12–15.9)
HGB BLD-MCNC: 8.7 G/DL (ref 12–15.9)
HGB BLD-MCNC: 8.7 G/DL (ref 12–15.9)
HGB BLD-MCNC: 8.8 G/DL (ref 12–15.9)
HGB BLD-MCNC: 9 G/DL (ref 12–15.9)
HGB BLD-MCNC: 9.2 G/DL (ref 12–15.9)
HGB BLD-MCNC: 9.3 G/DL (ref 12–15.9)
HGB BLD-MCNC: 9.5 G/DL (ref 12–15.9)
HGB BLD-MCNC: 9.9 G/DL (ref 12–15.9)
HGB UR QL STRIP.AUTO: NEGATIVE
HGB UR QL STRIP.AUTO: NEGATIVE
HOLD SPECIMEN: NORMAL
HYALINE CASTS UR QL AUTO: ABNORMAL /LPF
HYALINE CASTS UR QL AUTO: NORMAL /LPF
IGA SERPL-MCNC: 237 MG/DL (ref 64–422)
IGG SERPL-MCNC: 837 MG/DL (ref 586–1602)
IGM SERPL-MCNC: 35 MG/DL (ref 26–217)
IMM GRANULOCYTES # BLD AUTO: 0.07 10*3/MM3 (ref 0–0.05)
IMM GRANULOCYTES # BLD AUTO: 0.1 10*3/MM3 (ref 0–0.05)
IMM GRANULOCYTES # BLD AUTO: 0.14 10*3/MM3 (ref 0–0.05)
IMM GRANULOCYTES # BLD AUTO: 0.26 10*3/MM3 (ref 0–0.05)
IMM GRANULOCYTES NFR BLD AUTO: 0.5 % (ref 0–0.5)
IMM GRANULOCYTES NFR BLD AUTO: 0.7 % (ref 0–0.5)
IMM GRANULOCYTES NFR BLD AUTO: 0.7 % (ref 0–0.5)
IMM GRANULOCYTES NFR BLD AUTO: 1.4 % (ref 0–0.5)
INR PPP: 1.31 (ref 0.84–1.13)
INTERPRETATION SERPL IEP-IMP: ABNORMAL
IRON 24H UR-MRATE: 35 MCG/DL (ref 37–145)
KETONES UR QL STRIP: NEGATIVE
KETONES UR QL STRIP: NEGATIVE
KOH PREP NAIL: NORMAL
LAB AP CASE REPORT: NORMAL
LAB AP CLINICAL INFORMATION: NORMAL
LABORATORY COMMENT REPORT: ABNORMAL
LABORATORY COMMENT REPORT: NORMAL
LDH FLD-CCNC: 68 U/L
LDH SERPL-CCNC: 197 U/L (ref 135–214)
LEUKOCYTE ESTERASE UR QL STRIP.AUTO: ABNORMAL
LEUKOCYTE ESTERASE UR QL STRIP.AUTO: ABNORMAL
LIPASE SERPL-CCNC: 8 U/L (ref 13–60)
LYMPHOCYTES # BLD AUTO: 0.54 10*3/MM3 (ref 0.7–3.1)
LYMPHOCYTES # BLD AUTO: 0.76 10*3/MM3 (ref 0.7–3.1)
LYMPHOCYTES # BLD AUTO: 1 10*3/MM3 (ref 0.7–3.1)
LYMPHOCYTES # BLD AUTO: 1 10*3/MM3 (ref 0.7–3.1)
LYMPHOCYTES # BLD AUTO: 1.04 10*3/MM3 (ref 0.7–3.1)
LYMPHOCYTES NFR BLD AUTO: 11.3 % (ref 19.6–45.3)
LYMPHOCYTES NFR BLD AUTO: 2.9 % (ref 19.6–45.3)
LYMPHOCYTES NFR BLD AUTO: 5.4 % (ref 19.6–45.3)
LYMPHOCYTES NFR BLD AUTO: 5.6 % (ref 19.6–45.3)
LYMPHOCYTES NFR BLD AUTO: 6.6 % (ref 19.6–45.3)
LYMPHOCYTES NFR FLD MANUAL: 14 %
M PROTEIN SERPL ELPH-MCNC: ABNORMAL G/DL
M PROTEIN SERPL ELPH-MCNC: NORMAL G/DL
MACROPHAGE FLUID: 30 %
MAGNESIUM SERPL-MCNC: 1.5 MG/DL (ref 1.6–2.4)
MAGNESIUM SERPL-MCNC: 1.8 MG/DL (ref 1.6–2.4)
MAGNESIUM SERPL-MCNC: 2.7 MG/DL (ref 1.6–2.4)
MAXIMAL PREDICTED HEART RATE: 135 BPM
MCH RBC QN AUTO: 27.6 PG (ref 26.6–33)
MCH RBC QN AUTO: 27.8 PG (ref 26.6–33)
MCH RBC QN AUTO: 27.9 PG (ref 26.6–33)
MCH RBC QN AUTO: 28 PG (ref 26.6–33)
MCH RBC QN AUTO: 28 PG (ref 26.6–33)
MCH RBC QN AUTO: 28.1 PG (ref 26.6–33)
MCH RBC QN AUTO: 28.1 PG (ref 26.6–33)
MCH RBC QN AUTO: 28.4 PG (ref 26.6–33)
MCH RBC QN AUTO: 28.4 PG (ref 26.6–33)
MCH RBC QN AUTO: 28.6 PG (ref 26.6–33)
MCH RBC QN AUTO: 28.7 PG (ref 26.6–33)
MCH RBC QN AUTO: 28.9 PG (ref 26.6–33)
MCHC RBC AUTO-ENTMCNC: 32.1 G/DL (ref 31.5–35.7)
MCHC RBC AUTO-ENTMCNC: 32.2 G/DL (ref 31.5–35.7)
MCHC RBC AUTO-ENTMCNC: 32.4 G/DL (ref 31.5–35.7)
MCHC RBC AUTO-ENTMCNC: 32.4 G/DL (ref 31.5–35.7)
MCHC RBC AUTO-ENTMCNC: 32.6 G/DL (ref 31.5–35.7)
MCHC RBC AUTO-ENTMCNC: 33.1 G/DL (ref 31.5–35.7)
MCHC RBC AUTO-ENTMCNC: 33.6 G/DL (ref 31.5–35.7)
MCHC RBC AUTO-ENTMCNC: 33.8 G/DL (ref 31.5–35.7)
MCHC RBC AUTO-ENTMCNC: 33.9 G/DL (ref 31.5–35.7)
MCHC RBC AUTO-ENTMCNC: 34.1 G/DL (ref 31.5–35.7)
MCHC RBC AUTO-ENTMCNC: 34.2 G/DL (ref 31.5–35.7)
MCHC RBC AUTO-ENTMCNC: 34.7 G/DL (ref 31.5–35.7)
MCV RBC AUTO: 81.1 FL (ref 79–97)
MCV RBC AUTO: 82.8 FL (ref 79–97)
MCV RBC AUTO: 82.8 FL (ref 79–97)
MCV RBC AUTO: 82.9 FL (ref 79–97)
MCV RBC AUTO: 84.6 FL (ref 79–97)
MCV RBC AUTO: 84.6 FL (ref 79–97)
MCV RBC AUTO: 85.4 FL (ref 79–97)
MCV RBC AUTO: 85.8 FL (ref 79–97)
MCV RBC AUTO: 86.6 FL (ref 79–97)
MCV RBC AUTO: 86.7 FL (ref 79–97)
MCV RBC AUTO: 86.9 FL (ref 79–97)
MCV RBC AUTO: 88.7 FL (ref 79–97)
MESOTHL CELL NFR FLD MANUAL: 5 %
MONOCYTES # BLD AUTO: 0.2 10*3/MM3 (ref 0.1–0.9)
MONOCYTES # BLD AUTO: 0.3 10*3/MM3 (ref 0.1–0.9)
MONOCYTES # BLD AUTO: 1.03 10*3/MM3 (ref 0.1–0.9)
MONOCYTES # BLD AUTO: 1.11 10*3/MM3 (ref 0.1–0.9)
MONOCYTES # BLD AUTO: 1.6 10*3/MM3 (ref 0.1–0.9)
MONOCYTES NFR BLD AUTO: 1.6 % (ref 5–12)
MONOCYTES NFR BLD AUTO: 2.1 % (ref 5–12)
MONOCYTES NFR BLD AUTO: 7.3 % (ref 5–12)
MONOCYTES NFR BLD AUTO: 7.5 % (ref 5–12)
MONOCYTES NFR BLD AUTO: 8.3 % (ref 5–12)
MONOCYTES NFR FLD: 5 %
MRSA DNA SPEC QL NAA+PROBE: NEGATIVE
NEUTROPHILS NFR BLD AUTO: 11.64 10*3/MM3 (ref 1.7–7)
NEUTROPHILS NFR BLD AUTO: 12.71 10*3/MM3 (ref 1.7–7)
NEUTROPHILS NFR BLD AUTO: 16.46 10*3/MM3 (ref 1.7–7)
NEUTROPHILS NFR BLD AUTO: 17.39 10*3/MM3 (ref 1.7–7)
NEUTROPHILS NFR BLD AUTO: 7.8 10*3/MM3 (ref 1.7–7)
NEUTROPHILS NFR BLD AUTO: 83.9 % (ref 42.7–76)
NEUTROPHILS NFR BLD AUTO: 84.8 % (ref 42.7–76)
NEUTROPHILS NFR BLD AUTO: 85.3 % (ref 42.7–76)
NEUTROPHILS NFR BLD AUTO: 86.6 % (ref 42.7–76)
NEUTROPHILS NFR BLD AUTO: 93.7 % (ref 42.7–76)
NEUTROPHILS NFR FLD MANUAL: 46 %
NITRITE UR QL STRIP: NEGATIVE
NITRITE UR QL STRIP: NEGATIVE
NRBC BLD AUTO-RTO: 0 /100 WBC (ref 0–0.2)
NT-PROBNP SERPL-MCNC: 1167 PG/ML (ref 0–1800)
NT-PROBNP SERPL-MCNC: 1518 PG/ML (ref 0–1800)
NT-PROBNP SERPL-MCNC: 2088 PG/ML (ref 0–1800)
NT-PROBNP SERPL-MCNC: 2200 PG/ML (ref 0–1800)
NT-PROBNP SERPL-MCNC: 2247 PG/ML (ref 0–1800)
PATH REPORT.FINAL DX SPEC: NORMAL
PH UR STRIP.AUTO: <=5 [PH] (ref 5–8)
PH UR STRIP.AUTO: <=5 [PH] (ref 5–8)
PLATELET # BLD AUTO: 222 10*3/MM3 (ref 140–450)
PLATELET # BLD AUTO: 225 10*3/MM3 (ref 140–450)
PLATELET # BLD AUTO: 235 10*3/MM3 (ref 140–450)
PLATELET # BLD AUTO: 240 10*3/MM3 (ref 140–450)
PLATELET # BLD AUTO: 247 10*3/MM3 (ref 140–450)
PLATELET # BLD AUTO: 247 10*3/MM3 (ref 140–450)
PLATELET # BLD AUTO: 250 10*3/MM3 (ref 140–450)
PLATELET # BLD AUTO: 251 10*3/MM3 (ref 140–450)
PLATELET # BLD AUTO: 261 10*3/MM3 (ref 140–450)
PLATELET # BLD AUTO: 266 10*3/MM3 (ref 140–450)
PLATELET # BLD AUTO: 269 10*3/MM3 (ref 140–450)
PLATELET # BLD AUTO: 300 10*3/MM3 (ref 140–450)
PMV BLD AUTO: 10.1 FL (ref 6–12)
PMV BLD AUTO: 10.1 FL (ref 6–12)
PMV BLD AUTO: 10.2 FL (ref 6–12)
PMV BLD AUTO: 10.3 FL (ref 6–12)
PMV BLD AUTO: 10.3 FL (ref 6–12)
PMV BLD AUTO: 10.4 FL (ref 6–12)
PMV BLD AUTO: 10.5 FL (ref 6–12)
PMV BLD AUTO: 10.5 FL (ref 6–12)
PMV BLD AUTO: 10.9 FL (ref 6–12)
PMV BLD AUTO: 7.9 FL (ref 6–12)
POTASSIUM SERPL-SCNC: 3 MMOL/L (ref 3.5–5.2)
POTASSIUM SERPL-SCNC: 3.4 MMOL/L (ref 3.5–5.2)
POTASSIUM SERPL-SCNC: 3.5 MMOL/L (ref 3.5–5.2)
POTASSIUM SERPL-SCNC: 3.5 MMOL/L (ref 3.5–5.2)
POTASSIUM SERPL-SCNC: 3.6 MMOL/L (ref 3.5–5.2)
POTASSIUM SERPL-SCNC: 3.6 MMOL/L (ref 3.5–5.2)
POTASSIUM SERPL-SCNC: 3.7 MMOL/L (ref 3.5–5.2)
POTASSIUM SERPL-SCNC: 4 MMOL/L (ref 3.5–5.2)
POTASSIUM SERPL-SCNC: 4.2 MMOL/L (ref 3.5–5.2)
POTASSIUM SERPL-SCNC: 4.3 MMOL/L (ref 3.5–5.2)
POTASSIUM SERPL-SCNC: 4.4 MMOL/L (ref 3.5–5.2)
POTASSIUM SERPL-SCNC: 4.6 MMOL/L (ref 3.5–5.2)
POTASSIUM SERPL-SCNC: 4.6 MMOL/L (ref 3.5–5.2)
PROCALCITONIN SERPL-MCNC: 0.08 NG/ML (ref 0–0.25)
PROCALCITONIN SERPL-MCNC: 0.1 NG/ML (ref 0–0.25)
PROCALCITONIN SERPL-MCNC: 0.12 NG/ML (ref 0–0.25)
PROCALCITONIN SERPL-MCNC: 0.12 NG/ML (ref 0–0.25)
PROT FLD-MCNC: 2.5 G/DL
PROT PATTERN SERPL ELPH-IMP: NORMAL
PROT SERPL-MCNC: 4.3 G/DL (ref 6–8.5)
PROT SERPL-MCNC: 4.7 G/DL (ref 6–8.5)
PROT SERPL-MCNC: 5.3 G/DL (ref 6–8.5)
PROT SERPL-MCNC: 5.4 G/DL (ref 6–8.5)
PROT SERPL-MCNC: 6.6 G/DL (ref 6–8.5)
PROT SERPL-MCNC: 7 G/DL (ref 6–8.5)
PROT UR QL STRIP: NEGATIVE
PROT UR QL STRIP: NEGATIVE
PROTHROMBIN TIME: 16.3 SECONDS (ref 11.4–14.4)
QT INTERVAL: 292 MS
QT INTERVAL: 298 MS
QT INTERVAL: 330 MS
QT INTERVAL: 378 MS
QT INTERVAL: 380 MS
QTC INTERVAL: 407 MS
QTC INTERVAL: 430 MS
QTC INTERVAL: 433 MS
QTC INTERVAL: 447 MS
QTC INTERVAL: 470 MS
RBC # BLD AUTO: 3.01 10*6/MM3 (ref 3.77–5.28)
RBC # BLD AUTO: 3.03 10*6/MM3 (ref 3.77–5.28)
RBC # BLD AUTO: 3.03 10*6/MM3 (ref 3.77–5.28)
RBC # BLD AUTO: 3.1 10*6/MM3 (ref 3.77–5.28)
RBC # BLD AUTO: 3.11 10*6/MM3 (ref 3.77–5.28)
RBC # BLD AUTO: 3.2 10*6/MM3 (ref 3.77–5.28)
RBC # BLD AUTO: 3.24 10*6/MM3 (ref 3.77–5.28)
RBC # BLD AUTO: 3.35 10*6/MM3 (ref 3.77–5.28)
RBC # BLD AUTO: 3.44 10*6/MM3 (ref 3.77–5.28)
RBC # BLD AUTO: 3.5 10*6/MM3 (ref 3.77–5.28)
RBC # BLD AUTO: 3.53 10*6/MM3 (ref 3.77–5.28)
RBC # BLD AUTO: 3.7 10*6/MM3 (ref 3.77–5.28)
RBC # FLD AUTO: <2000 /MM3
RBC # UR STRIP: ABNORMAL /HPF
RBC # UR STRIP: NORMAL /HPF
REF LAB TEST METHOD: ABNORMAL
REF LAB TEST METHOD: NORMAL
RETICS # AUTO: 0.05 10*6/MM3 (ref 0.02–0.13)
RETICS/RBC NFR AUTO: 1.39 % (ref 0.7–1.9)
SARS-COV-2 RDRP RESP QL NAA+PROBE: NORMAL
SARS-COV-2 RNA PNL SPEC NAA+PROBE: NOT DETECTED
SODIUM SERPL-SCNC: 129 MMOL/L (ref 136–145)
SODIUM SERPL-SCNC: 130 MMOL/L (ref 136–145)
SODIUM SERPL-SCNC: 132 MMOL/L (ref 136–145)
SODIUM SERPL-SCNC: 136 MMOL/L (ref 136–145)
SODIUM SERPL-SCNC: 138 MMOL/L (ref 136–145)
SODIUM SERPL-SCNC: 138 MMOL/L (ref 136–145)
SP GR UR STRIP: 1.01 (ref 1–1.03)
SP GR UR STRIP: 1.02 (ref 1–1.03)
SQUAMOUS #/AREA URNS HPF: ABNORMAL /HPF
SQUAMOUS #/AREA URNS HPF: NORMAL /HPF
STRESS TARGET HR: 115 BPM
TROPONIN T SERPL-MCNC: 0.01 NG/ML (ref 0–0.03)
TROPONIN T SERPL-MCNC: 0.02 NG/ML (ref 0–0.03)
TROPONIN T SERPL-MCNC: 0.03 NG/ML (ref 0–0.03)
TROPONIN T SERPL-MCNC: <0.01 NG/ML (ref 0–0.03)
UROBILINOGEN UR QL STRIP: ABNORMAL
UROBILINOGEN UR QL STRIP: ABNORMAL
WBC # FLD AUTO: 660 /MM3
WBC # UR STRIP: ABNORMAL /HPF
WBC # UR STRIP: NORMAL /HPF
WBC NRBC COR # BLD: 10.57 10*3/MM3 (ref 3.4–10.8)
WBC NRBC COR # BLD: 10.58 10*3/MM3 (ref 3.4–10.8)
WBC NRBC COR # BLD: 13.65 10*3/MM3 (ref 3.4–10.8)
WBC NRBC COR # BLD: 15.14 10*3/MM3 (ref 3.4–10.8)
WBC NRBC COR # BLD: 15.31 10*3/MM3 (ref 3.4–10.8)
WBC NRBC COR # BLD: 17.26 10*3/MM3 (ref 3.4–10.8)
WBC NRBC COR # BLD: 18.55 10*3/MM3 (ref 3.4–10.8)
WBC NRBC COR # BLD: 19.39 10*3/MM3 (ref 3.4–10.8)
WBC NRBC COR # BLD: 23.08 10*3/MM3 (ref 3.4–10.8)
WBC NRBC COR # BLD: 8.37 10*3/MM3 (ref 3.4–10.8)
WBC NRBC COR # BLD: 9 10*3/MM3 (ref 3.4–10.8)
WBC NRBC COR # BLD: 9.28 10*3/MM3 (ref 3.4–10.8)
WHOLE BLOOD HOLD SPECIMEN: NORMAL
WHOLE BLOOD HOLD SPECIMEN: NORMAL

## 2022-01-01 PROCEDURE — 93306 TTE W/DOPPLER COMPLETE: CPT

## 2022-01-01 PROCEDURE — 97161 PT EVAL LOW COMPLEX 20 MIN: CPT

## 2022-01-01 PROCEDURE — 25010000002 MEROPENEM PER 100 MG: Performed by: FAMILY MEDICINE

## 2022-01-01 PROCEDURE — 94664 DEMO&/EVAL PT USE INHALER: CPT

## 2022-01-01 PROCEDURE — 94799 UNLISTED PULMONARY SVC/PX: CPT

## 2022-01-01 PROCEDURE — 25010000002 LORAZEPAM PER 2 MG

## 2022-01-01 PROCEDURE — 87070 CULTURE OTHR SPECIMN AEROBIC: CPT | Performed by: PHYSICIAN ASSISTANT

## 2022-01-01 PROCEDURE — 80048 BASIC METABOLIC PNL TOTAL CA: CPT | Performed by: FAMILY MEDICINE

## 2022-01-01 PROCEDURE — 83880 ASSAY OF NATRIURETIC PEPTIDE: CPT | Performed by: FAMILY MEDICINE

## 2022-01-01 PROCEDURE — 85027 COMPLETE CBC AUTOMATED: CPT | Performed by: FAMILY MEDICINE

## 2022-01-01 PROCEDURE — 74240 X-RAY XM UPR GI TRC 1CNTRST: CPT

## 2022-01-01 PROCEDURE — 25010000002 MORPHINE PER 10 MG: Performed by: FAMILY MEDICINE

## 2022-01-01 PROCEDURE — 80053 COMPREHEN METABOLIC PANEL: CPT

## 2022-01-01 PROCEDURE — 83605 ASSAY OF LACTIC ACID: CPT | Performed by: PHYSICIAN ASSISTANT

## 2022-01-01 PROCEDURE — 25010000002 AMIODARONE IN DEXTROSE 5% 360-4.14 MG/200ML-% SOLUTION: Performed by: INTERNAL MEDICINE

## 2022-01-01 PROCEDURE — 83615 LACTATE (LD) (LDH) ENZYME: CPT | Performed by: INTERNAL MEDICINE

## 2022-01-01 PROCEDURE — 82728 ASSAY OF FERRITIN: CPT | Performed by: INTERNAL MEDICINE

## 2022-01-01 PROCEDURE — 71045 X-RAY EXAM CHEST 1 VIEW: CPT

## 2022-01-01 PROCEDURE — 80053 COMPREHEN METABOLIC PANEL: CPT | Performed by: INTERNAL MEDICINE

## 2022-01-01 PROCEDURE — 25010000002 MAGNESIUM SULFATE 2 GM/50ML SOLUTION: Performed by: FAMILY MEDICINE

## 2022-01-01 PROCEDURE — 99232 SBSQ HOSP IP/OBS MODERATE 35: CPT | Performed by: INTERNAL MEDICINE

## 2022-01-01 PROCEDURE — 99232 SBSQ HOSP IP/OBS MODERATE 35: CPT | Performed by: FAMILY MEDICINE

## 2022-01-01 PROCEDURE — 99231 SBSQ HOSP IP/OBS SF/LOW 25: CPT | Performed by: INTERNAL MEDICINE

## 2022-01-01 PROCEDURE — 85025 COMPLETE CBC W/AUTO DIFF WBC: CPT | Performed by: FAMILY MEDICINE

## 2022-01-01 PROCEDURE — 94761 N-INVAS EAR/PLS OXIMETRY MLT: CPT

## 2022-01-01 PROCEDURE — 99231 SBSQ HOSP IP/OBS SF/LOW 25: CPT | Performed by: FAMILY MEDICINE

## 2022-01-01 PROCEDURE — 83735 ASSAY OF MAGNESIUM: CPT | Performed by: FAMILY MEDICINE

## 2022-01-01 PROCEDURE — 76942 ECHO GUIDE FOR BIOPSY: CPT

## 2022-01-01 PROCEDURE — 25010000002 FUROSEMIDE PER 20 MG: Performed by: INTERNAL MEDICINE

## 2022-01-01 PROCEDURE — 85027 COMPLETE CBC AUTOMATED: CPT | Performed by: INTERNAL MEDICINE

## 2022-01-01 PROCEDURE — 74230 X-RAY XM SWLNG FUNCJ C+: CPT

## 2022-01-01 PROCEDURE — 99233 SBSQ HOSP IP/OBS HIGH 50: CPT | Performed by: FAMILY MEDICINE

## 2022-01-01 PROCEDURE — 99222 1ST HOSP IP/OBS MODERATE 55: CPT | Performed by: INTERNAL MEDICINE

## 2022-01-01 PROCEDURE — 87641 MR-STAPH DNA AMP PROBE: CPT

## 2022-01-01 PROCEDURE — 97530 THERAPEUTIC ACTIVITIES: CPT

## 2022-01-01 PROCEDURE — 89051 BODY FLUID CELL COUNT: CPT | Performed by: PHYSICIAN ASSISTANT

## 2022-01-01 PROCEDURE — 85025 COMPLETE CBC W/AUTO DIFF WBC: CPT

## 2022-01-01 PROCEDURE — 85025 COMPLETE CBC W/AUTO DIFF WBC: CPT | Performed by: PHYSICIAN ASSISTANT

## 2022-01-01 PROCEDURE — 36415 COLL VENOUS BLD VENIPUNCTURE: CPT

## 2022-01-01 PROCEDURE — 93010 ELECTROCARDIOGRAM REPORT: CPT | Performed by: INTERNAL MEDICINE

## 2022-01-01 PROCEDURE — 99232 SBSQ HOSP IP/OBS MODERATE 35: CPT | Performed by: NURSE PRACTITIONER

## 2022-01-01 PROCEDURE — 80053 COMPREHEN METABOLIC PANEL: CPT | Performed by: FAMILY MEDICINE

## 2022-01-01 PROCEDURE — 93005 ELECTROCARDIOGRAM TRACING: CPT | Performed by: INTERNAL MEDICINE

## 2022-01-01 PROCEDURE — 25010000002 HYDROMORPHONE PER 4 MG: Performed by: FAMILY MEDICINE

## 2022-01-01 PROCEDURE — 25010000002 PIPERACILLIN SOD-TAZOBACTAM PER 1 G: Performed by: FAMILY MEDICINE

## 2022-01-01 PROCEDURE — 99284 EMERGENCY DEPT VISIT MOD MDM: CPT

## 2022-01-01 PROCEDURE — 87040 BLOOD CULTURE FOR BACTERIA: CPT | Performed by: FAMILY MEDICINE

## 2022-01-01 PROCEDURE — 25010000002 HYDROMORPHONE PER 4 MG: Performed by: INTERNAL MEDICINE

## 2022-01-01 PROCEDURE — 97116 GAIT TRAINING THERAPY: CPT

## 2022-01-01 PROCEDURE — 99285 EMERGENCY DEPT VISIT HI MDM: CPT

## 2022-01-01 PROCEDURE — 84157 ASSAY OF PROTEIN OTHER: CPT | Performed by: PHYSICIAN ASSISTANT

## 2022-01-01 PROCEDURE — 99238 HOSP IP/OBS DSCHRG MGMT 30/<: CPT | Performed by: FAMILY MEDICINE

## 2022-01-01 PROCEDURE — 97110 THERAPEUTIC EXERCISES: CPT

## 2022-01-01 PROCEDURE — 0 FLUDEOXYGLUCOSE F18 SOLUTION: Performed by: INTERNAL MEDICINE

## 2022-01-01 PROCEDURE — 80048 BASIC METABOLIC PNL TOTAL CA: CPT | Performed by: INTERNAL MEDICINE

## 2022-01-01 PROCEDURE — 84165 PROTEIN E-PHORESIS SERUM: CPT

## 2022-01-01 PROCEDURE — 78815 PET IMAGE W/CT SKULL-THIGH: CPT

## 2022-01-01 PROCEDURE — 97535 SELF CARE MNGMENT TRAINING: CPT

## 2022-01-01 PROCEDURE — 84484 ASSAY OF TROPONIN QUANT: CPT | Performed by: EMERGENCY MEDICINE

## 2022-01-01 PROCEDURE — 25010000002 DIGOXIN PER 500 MCG: Performed by: FAMILY MEDICINE

## 2022-01-01 PROCEDURE — 84145 PROCALCITONIN (PCT): CPT | Performed by: PHYSICIAN ASSISTANT

## 2022-01-01 PROCEDURE — 70450 CT HEAD/BRAIN W/O DYE: CPT

## 2022-01-01 PROCEDURE — 71275 CT ANGIOGRAPHY CHEST: CPT

## 2022-01-01 PROCEDURE — 0W993ZX DRAINAGE OF RIGHT PLEURAL CAVITY, PERCUTANEOUS APPROACH, DIAGNOSTIC: ICD-10-PCS | Performed by: RADIOLOGY

## 2022-01-01 PROCEDURE — 93005 ELECTROCARDIOGRAM TRACING: CPT

## 2022-01-01 PROCEDURE — 81001 URINALYSIS AUTO W/SCOPE: CPT | Performed by: FAMILY MEDICINE

## 2022-01-01 PROCEDURE — 0 IOPAMIDOL PER 1 ML: Performed by: EMERGENCY MEDICINE

## 2022-01-01 PROCEDURE — 83615 LACTATE (LD) (LDH) ENZYME: CPT | Performed by: PHYSICIAN ASSISTANT

## 2022-01-01 PROCEDURE — 83605 ASSAY OF LACTIC ACID: CPT | Performed by: FAMILY MEDICINE

## 2022-01-01 PROCEDURE — 84132 ASSAY OF SERUM POTASSIUM: CPT | Performed by: FAMILY MEDICINE

## 2022-01-01 PROCEDURE — 87015 SPECIMEN INFECT AGNT CONCNTJ: CPT | Performed by: PHYSICIAN ASSISTANT

## 2022-01-01 PROCEDURE — 86334 IMMUNOFIX E-PHORESIS SERUM: CPT | Performed by: INTERNAL MEDICINE

## 2022-01-01 PROCEDURE — 97165 OT EVAL LOW COMPLEX 30 MIN: CPT

## 2022-01-01 PROCEDURE — 99283 EMERGENCY DEPT VISIT LOW MDM: CPT

## 2022-01-01 PROCEDURE — 84155 ASSAY OF PROTEIN SERUM: CPT | Performed by: INTERNAL MEDICINE

## 2022-01-01 PROCEDURE — 99223 1ST HOSP IP/OBS HIGH 75: CPT | Performed by: INTERNAL MEDICINE

## 2022-01-01 PROCEDURE — 88305 TISSUE EXAM BY PATHOLOGIST: CPT | Performed by: PHYSICIAN ASSISTANT

## 2022-01-01 PROCEDURE — 99233 SBSQ HOSP IP/OBS HIGH 50: CPT | Performed by: INTERNAL MEDICINE

## 2022-01-01 PROCEDURE — 82784 ASSAY IGA/IGD/IGG/IGM EACH: CPT | Performed by: INTERNAL MEDICINE

## 2022-01-01 PROCEDURE — 82746 ASSAY OF FOLIC ACID SERUM: CPT | Performed by: INTERNAL MEDICINE

## 2022-01-01 PROCEDURE — 87636 SARSCOV2 & INF A&B AMP PRB: CPT | Performed by: INTERNAL MEDICINE

## 2022-01-01 PROCEDURE — 83880 ASSAY OF NATRIURETIC PEPTIDE: CPT | Performed by: EMERGENCY MEDICINE

## 2022-01-01 PROCEDURE — 99214 OFFICE O/P EST MOD 30 MIN: CPT | Performed by: INTERNAL MEDICINE

## 2022-01-01 PROCEDURE — 99231 SBSQ HOSP IP/OBS SF/LOW 25: CPT | Performed by: NURSE PRACTITIONER

## 2022-01-01 PROCEDURE — 81001 URINALYSIS AUTO W/SCOPE: CPT | Performed by: PHYSICIAN ASSISTANT

## 2022-01-01 PROCEDURE — 82272 OCCULT BLD FECES 1-3 TESTS: CPT | Performed by: INTERNAL MEDICINE

## 2022-01-01 PROCEDURE — 84165 PROTEIN E-PHORESIS SERUM: CPT | Performed by: INTERNAL MEDICINE

## 2022-01-01 PROCEDURE — 99291 CRITICAL CARE FIRST HOUR: CPT | Performed by: FAMILY MEDICINE

## 2022-01-01 PROCEDURE — 94640 AIRWAY INHALATION TREATMENT: CPT

## 2022-01-01 PROCEDURE — 93005 ELECTROCARDIOGRAM TRACING: CPT | Performed by: FAMILY MEDICINE

## 2022-01-01 PROCEDURE — 25010000002 MEROPENEM PER 100 MG

## 2022-01-01 PROCEDURE — 87205 SMEAR GRAM STAIN: CPT | Performed by: PHYSICIAN ASSISTANT

## 2022-01-01 PROCEDURE — P9047 ALBUMIN (HUMAN), 25%, 50ML: HCPCS | Performed by: FAMILY MEDICINE

## 2022-01-01 PROCEDURE — 93005 ELECTROCARDIOGRAM TRACING: CPT | Performed by: EMERGENCY MEDICINE

## 2022-01-01 PROCEDURE — 84484 ASSAY OF TROPONIN QUANT: CPT | Performed by: FAMILY MEDICINE

## 2022-01-01 PROCEDURE — 82962 GLUCOSE BLOOD TEST: CPT

## 2022-01-01 PROCEDURE — 25010000002 ONDANSETRON PER 1 MG: Performed by: PHYSICIAN ASSISTANT

## 2022-01-01 PROCEDURE — 92526 ORAL FUNCTION THERAPY: CPT

## 2022-01-01 PROCEDURE — 25010000002 LORAZEPAM PER 2 MG: Performed by: INTERNAL MEDICINE

## 2022-01-01 PROCEDURE — 80048 BASIC METABOLIC PNL TOTAL CA: CPT | Performed by: PHYSICIAN ASSISTANT

## 2022-01-01 PROCEDURE — 84145 PROCALCITONIN (PCT): CPT | Performed by: FAMILY MEDICINE

## 2022-01-01 PROCEDURE — 85045 AUTOMATED RETICULOCYTE COUNT: CPT | Performed by: INTERNAL MEDICINE

## 2022-01-01 PROCEDURE — 88112 CYTOPATH CELL ENHANCE TECH: CPT | Performed by: PHYSICIAN ASSISTANT

## 2022-01-01 PROCEDURE — 83540 ASSAY OF IRON: CPT | Performed by: INTERNAL MEDICINE

## 2022-01-01 PROCEDURE — 93005 ELECTROCARDIOGRAM TRACING: CPT | Performed by: NURSE PRACTITIONER

## 2022-01-01 PROCEDURE — 92611 MOTION FLUOROSCOPY/SWALLOW: CPT | Performed by: SPEECH-LANGUAGE PATHOLOGIST

## 2022-01-01 PROCEDURE — A9552 F18 FDG: HCPCS | Performed by: INTERNAL MEDICINE

## 2022-01-01 PROCEDURE — 83880 ASSAY OF NATRIURETIC PEPTIDE: CPT | Performed by: INTERNAL MEDICINE

## 2022-01-01 PROCEDURE — 25010000002 VANCOMYCIN 10 G RECONSTITUTED SOLUTION

## 2022-01-01 PROCEDURE — 25010000002 ALBUMIN HUMAN 25% PER 50 ML: Performed by: FAMILY MEDICINE

## 2022-01-01 PROCEDURE — 25010000002 DIGOXIN PER 500 MCG: Performed by: INTERNAL MEDICINE

## 2022-01-01 PROCEDURE — 71046 X-RAY EXAM CHEST 2 VIEWS: CPT

## 2022-01-01 PROCEDURE — 83690 ASSAY OF LIPASE: CPT

## 2022-01-01 PROCEDURE — 85610 PROTHROMBIN TIME: CPT | Performed by: RADIOLOGY

## 2022-01-01 PROCEDURE — 87635 SARS-COV-2 COVID-19 AMP PRB: CPT | Performed by: FAMILY MEDICINE

## 2022-01-01 PROCEDURE — 25010000002 METHYLPREDNISOLONE PER 40 MG: Performed by: INTERNAL MEDICINE

## 2022-01-01 PROCEDURE — 71101 X-RAY EXAM UNILAT RIBS/CHEST: CPT

## 2022-01-01 PROCEDURE — 87220 TISSUE EXAM FOR FUNGI: CPT | Performed by: PHYSICIAN ASSISTANT

## 2022-01-01 PROCEDURE — 92610 EVALUATE SWALLOWING FUNCTION: CPT

## 2022-01-01 PROCEDURE — 25010000002 AMIODARONE IN DEXTROSE 5% 150-4.21 MG/100ML-% SOLUTION: Performed by: INTERNAL MEDICINE

## 2022-01-01 RX ORDER — FUROSEMIDE 10 MG/ML
40 INJECTION INTRAMUSCULAR; INTRAVENOUS EVERY 12 HOURS
Status: DISCONTINUED | OUTPATIENT
Start: 2022-01-01 | End: 2022-01-01

## 2022-01-01 RX ORDER — ASPIRIN 81 MG/1
81 TABLET ORAL DAILY
Status: DISCONTINUED | OUTPATIENT
Start: 2022-01-01 | End: 2022-01-01 | Stop reason: HOSPADM

## 2022-01-01 RX ORDER — GABAPENTIN 300 MG/1
300 CAPSULE ORAL EVERY 8 HOURS SCHEDULED
Status: DISCONTINUED | OUTPATIENT
Start: 2022-01-01 | End: 2022-01-01 | Stop reason: HOSPADM

## 2022-01-01 RX ORDER — ONDANSETRON 2 MG/ML
4 INJECTION INTRAMUSCULAR; INTRAVENOUS EVERY 6 HOURS PRN
Status: DISCONTINUED | OUTPATIENT
Start: 2022-01-01 | End: 2022-01-01 | Stop reason: HOSPADM

## 2022-01-01 RX ORDER — SODIUM CHLORIDE 0.9 % (FLUSH) 0.9 %
10 SYRINGE (ML) INJECTION AS NEEDED
Status: DISCONTINUED | OUTPATIENT
Start: 2022-01-01 | End: 2022-01-01 | Stop reason: HOSPADM

## 2022-01-01 RX ORDER — LORAZEPAM 2 MG/ML
0.5 INJECTION INTRAMUSCULAR EVERY 4 HOURS PRN
Status: DISCONTINUED | OUTPATIENT
Start: 2022-01-01 | End: 2022-01-01 | Stop reason: HOSPADM

## 2022-01-01 RX ORDER — LISINOPRIL 5 MG/1
5 TABLET ORAL DAILY
Status: DISCONTINUED | OUTPATIENT
Start: 2022-01-01 | End: 2022-01-01

## 2022-01-01 RX ORDER — DOCUSATE SODIUM 100 MG/1
200 CAPSULE, LIQUID FILLED ORAL 2 TIMES DAILY
Status: DISCONTINUED | OUTPATIENT
Start: 2022-01-01 | End: 2022-01-01 | Stop reason: HOSPADM

## 2022-01-01 RX ORDER — HYDROMORPHONE HYDROCHLORIDE 1 MG/ML
0.5 INJECTION, SOLUTION INTRAMUSCULAR; INTRAVENOUS; SUBCUTANEOUS EVERY 4 HOURS
Status: DISCONTINUED | OUTPATIENT
Start: 2022-01-01 | End: 2022-01-01 | Stop reason: HOSPADM

## 2022-01-01 RX ORDER — HALOPERIDOL 5 MG/ML
1 INJECTION INTRAMUSCULAR EVERY 4 HOURS PRN
Status: DISCONTINUED | OUTPATIENT
Start: 2022-01-01 | End: 2022-01-01 | Stop reason: HOSPADM

## 2022-01-01 RX ORDER — KETOROLAC TROMETHAMINE 15 MG/ML
15 INJECTION, SOLUTION INTRAMUSCULAR; INTRAVENOUS EVERY 6 HOURS PRN
Status: DISCONTINUED | OUTPATIENT
Start: 2022-01-01 | End: 2022-01-01 | Stop reason: HOSPADM

## 2022-01-01 RX ORDER — CITALOPRAM 20 MG/1
20 TABLET ORAL DAILY
Status: DISCONTINUED | OUTPATIENT
Start: 2022-01-01 | End: 2022-01-01 | Stop reason: HOSPADM

## 2022-01-01 RX ORDER — LIDOCAINE 50 MG/G
1 PATCH TOPICAL
Status: DISCONTINUED | OUTPATIENT
Start: 2022-01-01 | End: 2022-01-01 | Stop reason: HOSPADM

## 2022-01-01 RX ORDER — APIXABAN 5 MG/1
TABLET, FILM COATED ORAL
COMMUNITY
Start: 2022-01-01

## 2022-01-01 RX ORDER — POTASSIUM CHLORIDE 1.5 G/1.77G
40 POWDER, FOR SOLUTION ORAL AS NEEDED
Status: DISCONTINUED | OUTPATIENT
Start: 2022-01-01 | End: 2022-01-01

## 2022-01-01 RX ORDER — PANTOPRAZOLE SODIUM 40 MG/1
40 TABLET, DELAYED RELEASE ORAL
Status: CANCELLED | OUTPATIENT
Start: 2022-01-01

## 2022-01-01 RX ORDER — AMIODARONE HYDROCHLORIDE 200 MG/1
200 TABLET ORAL EVERY 12 HOURS SCHEDULED
Status: DISCONTINUED | OUTPATIENT
Start: 2022-01-01 | End: 2022-01-01 | Stop reason: HOSPADM

## 2022-01-01 RX ORDER — HYDROCODONE BITARTRATE AND ACETAMINOPHEN 5; 325 MG/1; MG/1
1 TABLET ORAL EVERY 6 HOURS PRN
Status: DISCONTINUED | OUTPATIENT
Start: 2022-01-01 | End: 2022-01-01 | Stop reason: HOSPADM

## 2022-01-01 RX ORDER — LIDOCAINE 50 MG/G
1 PATCH TOPICAL DAILY PRN
Status: CANCELLED | OUTPATIENT
Start: 2022-01-01

## 2022-01-01 RX ORDER — MORPHINE SULFATE 20 MG/ML
5 SOLUTION ORAL EVERY 4 HOURS PRN
Status: DISCONTINUED | OUTPATIENT
Start: 2022-01-01 | End: 2022-01-01

## 2022-01-01 RX ORDER — SODIUM CHLORIDE 0.9 % (FLUSH) 0.9 %
10 SYRINGE (ML) INJECTION EVERY 12 HOURS SCHEDULED
Status: DISCONTINUED | OUTPATIENT
Start: 2022-01-01 | End: 2022-01-01 | Stop reason: HOSPADM

## 2022-01-01 RX ORDER — FUROSEMIDE 40 MG/1
40 TABLET ORAL DAILY
Status: DISCONTINUED | OUTPATIENT
Start: 2022-01-01 | End: 2022-01-01

## 2022-01-01 RX ORDER — OXYBUTYNIN CHLORIDE 5 MG/1
5 TABLET, EXTENDED RELEASE ORAL DAILY
Status: DISCONTINUED | OUTPATIENT
Start: 2022-01-01 | End: 2022-01-01 | Stop reason: HOSPADM

## 2022-01-01 RX ORDER — HYDROMORPHONE HYDROCHLORIDE 1 MG/ML
0.5 INJECTION, SOLUTION INTRAMUSCULAR; INTRAVENOUS; SUBCUTANEOUS
Status: DISCONTINUED | OUTPATIENT
Start: 2022-01-01 | End: 2022-01-01 | Stop reason: HOSPADM

## 2022-01-01 RX ORDER — BISACODYL 5 MG/1
10 TABLET, DELAYED RELEASE ORAL DAILY PRN
Status: DISCONTINUED | OUTPATIENT
Start: 2022-01-01 | End: 2022-01-01

## 2022-01-01 RX ORDER — ASPIRIN 81 MG/1
81 TABLET ORAL DAILY
Status: CANCELLED | OUTPATIENT
Start: 2022-01-01

## 2022-01-01 RX ORDER — POLYETHYLENE GLYCOL 3350 17 G/17G
17 POWDER, FOR SOLUTION ORAL DAILY
Status: DISCONTINUED | OUTPATIENT
Start: 2022-01-01 | End: 2022-01-01 | Stop reason: HOSPADM

## 2022-01-01 RX ORDER — FUROSEMIDE 10 MG/ML
40 INJECTION INTRAMUSCULAR; INTRAVENOUS ONCE
Status: COMPLETED | OUTPATIENT
Start: 2022-01-01 | End: 2022-01-01

## 2022-01-01 RX ORDER — LORAZEPAM 2 MG/ML
0.5 INJECTION INTRAMUSCULAR
Status: DISCONTINUED | OUTPATIENT
Start: 2022-01-01 | End: 2022-01-01 | Stop reason: HOSPADM

## 2022-01-01 RX ORDER — MAGNESIUM SULFATE HEPTAHYDRATE 40 MG/ML
2 INJECTION, SOLUTION INTRAVENOUS AS NEEDED
Status: DISCONTINUED | OUTPATIENT
Start: 2022-01-01 | End: 2022-01-01

## 2022-01-01 RX ORDER — GABAPENTIN 300 MG/1
300 CAPSULE ORAL EVERY 8 HOURS SCHEDULED
Status: CANCELLED | OUTPATIENT
Start: 2022-01-01

## 2022-01-01 RX ORDER — SACCHAROMYCES BOULARDII 250 MG
250 CAPSULE ORAL 2 TIMES DAILY
Status: CANCELLED | OUTPATIENT
Start: 2022-01-01

## 2022-01-01 RX ORDER — SODIUM CHLORIDE 0.9 % (FLUSH) 0.9 %
10 SYRINGE (ML) INJECTION AS NEEDED
Status: CANCELLED | OUTPATIENT
Start: 2022-01-01

## 2022-01-01 RX ORDER — ONDANSETRON 4 MG/1
4 TABLET, FILM COATED ORAL EVERY 6 HOURS PRN
Status: CANCELLED | OUTPATIENT
Start: 2022-01-01

## 2022-01-01 RX ORDER — LORAZEPAM 2 MG/ML
0.5 INJECTION INTRAMUSCULAR EVERY 4 HOURS PRN
Status: DISCONTINUED | OUTPATIENT
Start: 2022-01-01 | End: 2022-01-01

## 2022-01-01 RX ORDER — LIDOCAINE 50 MG/G
1 PATCH TOPICAL DAILY PRN
Status: DISCONTINUED | OUTPATIENT
Start: 2022-01-01 | End: 2022-01-01 | Stop reason: HOSPADM

## 2022-01-01 RX ORDER — GUAIFENESIN 600 MG/1
600 TABLET, EXTENDED RELEASE ORAL ONCE
Status: COMPLETED | OUTPATIENT
Start: 2022-01-01 | End: 2022-01-01

## 2022-01-01 RX ORDER — FUROSEMIDE 10 MG/ML
60 INJECTION INTRAMUSCULAR; INTRAVENOUS
Status: DISCONTINUED | OUTPATIENT
Start: 2022-01-01 | End: 2022-01-01

## 2022-01-01 RX ORDER — HYDROMORPHONE HYDROCHLORIDE 1 MG/ML
0.5 INJECTION, SOLUTION INTRAMUSCULAR; INTRAVENOUS; SUBCUTANEOUS
Status: CANCELLED | OUTPATIENT
Start: 2022-01-01 | End: 2022-05-08

## 2022-01-01 RX ORDER — ASPIRIN 81 MG/1
324 TABLET, CHEWABLE ORAL ONCE
Status: DISCONTINUED | OUTPATIENT
Start: 2022-01-01 | End: 2022-01-01

## 2022-01-01 RX ORDER — IPRATROPIUM BROMIDE AND ALBUTEROL SULFATE 2.5; .5 MG/3ML; MG/3ML
3 SOLUTION RESPIRATORY (INHALATION)
Status: DISCONTINUED | OUTPATIENT
Start: 2022-01-01 | End: 2022-01-01

## 2022-01-01 RX ORDER — MIDODRINE HYDROCHLORIDE 5 MG/1
5 TABLET ORAL ONCE
Status: COMPLETED | OUTPATIENT
Start: 2022-01-01 | End: 2022-01-01

## 2022-01-01 RX ORDER — ACETAMINOPHEN 500 MG
1000 TABLET ORAL ONCE
Status: DISCONTINUED | OUTPATIENT
Start: 2022-01-01 | End: 2022-01-01 | Stop reason: HOSPADM

## 2022-01-01 RX ORDER — ONDANSETRON 2 MG/ML
4 INJECTION INTRAMUSCULAR; INTRAVENOUS EVERY 6 HOURS PRN
Status: CANCELLED | OUTPATIENT
Start: 2022-01-01

## 2022-01-01 RX ORDER — GLYCOPYRROLATE 0.2 MG/ML
0.4 INJECTION INTRAMUSCULAR; INTRAVENOUS EVERY 4 HOURS PRN
Status: DISCONTINUED | OUTPATIENT
Start: 2022-01-01 | End: 2022-01-01 | Stop reason: HOSPADM

## 2022-01-01 RX ORDER — BISACODYL 10 MG
10 SUPPOSITORY, RECTAL RECTAL DAILY PRN
Status: CANCELLED | OUTPATIENT
Start: 2022-01-01

## 2022-01-01 RX ORDER — POTASSIUM CHLORIDE 750 MG/1
40 CAPSULE, EXTENDED RELEASE ORAL AS NEEDED
Status: DISCONTINUED | OUTPATIENT
Start: 2022-01-01 | End: 2022-01-01

## 2022-01-01 RX ORDER — IPRATROPIUM BROMIDE AND ALBUTEROL SULFATE 2.5; .5 MG/3ML; MG/3ML
3 SOLUTION RESPIRATORY (INHALATION) EVERY 6 HOURS PRN
Status: CANCELLED | OUTPATIENT
Start: 2022-01-01

## 2022-01-01 RX ORDER — GLYCOPYRROLATE 0.2 MG/ML
0.1 INJECTION INTRAMUSCULAR; INTRAVENOUS ONCE
Status: COMPLETED | OUTPATIENT
Start: 2022-01-01 | End: 2022-01-01

## 2022-01-01 RX ORDER — PANTOPRAZOLE SODIUM 40 MG/1
40 TABLET, DELAYED RELEASE ORAL
Status: DISCONTINUED | OUTPATIENT
Start: 2022-01-01 | End: 2022-01-01 | Stop reason: HOSPADM

## 2022-01-01 RX ORDER — SODIUM CHLORIDE 0.9 % (FLUSH) 0.9 %
10 SYRINGE (ML) INJECTION EVERY 12 HOURS SCHEDULED
Status: CANCELLED | OUTPATIENT
Start: 2022-01-01

## 2022-01-01 RX ORDER — LIDOCAINE 50 MG/G
1 PATCH TOPICAL
Status: CANCELLED | OUTPATIENT
Start: 2022-01-01

## 2022-01-01 RX ORDER — SCOLOPAMINE TRANSDERMAL SYSTEM 1 MG/1
1 PATCH, EXTENDED RELEASE TRANSDERMAL
Status: DISCONTINUED | OUTPATIENT
Start: 2022-01-01 | End: 2022-01-01 | Stop reason: HOSPADM

## 2022-01-01 RX ORDER — AMOXICILLIN AND CLAVULANATE POTASSIUM 875; 125 MG/1; MG/1
1 TABLET, FILM COATED ORAL EVERY 12 HOURS
COMMUNITY
Start: 2022-01-01

## 2022-01-01 RX ORDER — ATORVASTATIN CALCIUM 10 MG/1
10 TABLET, FILM COATED ORAL NIGHTLY
Status: DISCONTINUED | OUTPATIENT
Start: 2022-01-01 | End: 2022-01-01

## 2022-01-01 RX ORDER — HYDROCODONE BITARTRATE AND ACETAMINOPHEN 5; 325 MG/1; MG/1
1 TABLET ORAL EVERY 6 HOURS PRN
Status: CANCELLED | OUTPATIENT
Start: 2022-01-01 | End: 2022-05-08

## 2022-01-01 RX ORDER — POLYETHYLENE GLYCOL 3350 17 G/17G
17 POWDER, FOR SOLUTION ORAL DAILY
Status: CANCELLED | OUTPATIENT
Start: 2022-01-01

## 2022-01-01 RX ORDER — METHYLPREDNISOLONE SODIUM SUCCINATE 40 MG/ML
40 INJECTION, POWDER, LYOPHILIZED, FOR SOLUTION INTRAMUSCULAR; INTRAVENOUS ONCE
Status: COMPLETED | OUTPATIENT
Start: 2022-01-01 | End: 2022-01-01

## 2022-01-01 RX ORDER — CHOLECALCIFEROL (VITAMIN D3) 125 MCG
5 CAPSULE ORAL NIGHTLY
Status: DISCONTINUED | OUTPATIENT
Start: 2022-01-01 | End: 2022-01-01 | Stop reason: HOSPADM

## 2022-01-01 RX ORDER — ONDANSETRON 4 MG/1
4 TABLET, FILM COATED ORAL EVERY 6 HOURS PRN
Status: DISCONTINUED | OUTPATIENT
Start: 2022-01-01 | End: 2022-01-01 | Stop reason: HOSPADM

## 2022-01-01 RX ORDER — ACETAMINOPHEN 325 MG/1
650 TABLET ORAL EVERY 4 HOURS PRN
Status: DISCONTINUED | OUTPATIENT
Start: 2022-01-01 | End: 2022-01-01 | Stop reason: HOSPADM

## 2022-01-01 RX ORDER — SACCHAROMYCES BOULARDII 250 MG
250 CAPSULE ORAL 2 TIMES DAILY
Status: DISCONTINUED | OUTPATIENT
Start: 2022-01-01 | End: 2022-01-01 | Stop reason: HOSPADM

## 2022-01-01 RX ORDER — MIDODRINE HYDROCHLORIDE 5 MG/1
5 TABLET ORAL
Status: DISCONTINUED | OUTPATIENT
Start: 2022-01-01 | End: 2022-01-01

## 2022-01-01 RX ORDER — FUROSEMIDE 10 MG/ML
80 INJECTION INTRAMUSCULAR; INTRAVENOUS ONCE
Status: COMPLETED | OUTPATIENT
Start: 2022-01-01 | End: 2022-01-01

## 2022-01-01 RX ORDER — CHOLECALCIFEROL (VITAMIN D3) 125 MCG
5 CAPSULE ORAL NIGHTLY
Status: CANCELLED | OUTPATIENT
Start: 2022-01-01

## 2022-01-01 RX ORDER — DIGOXIN 0.25 MG/ML
250 INJECTION INTRAMUSCULAR; INTRAVENOUS ONCE
Status: COMPLETED | OUTPATIENT
Start: 2022-01-01 | End: 2022-01-01

## 2022-01-01 RX ORDER — ACETAMINOPHEN 650 MG/1
650 SUPPOSITORY RECTAL EVERY 4 HOURS PRN
Status: DISCONTINUED | OUTPATIENT
Start: 2022-01-01 | End: 2022-01-01 | Stop reason: HOSPADM

## 2022-01-01 RX ORDER — CITALOPRAM 20 MG/1
20 TABLET ORAL DAILY
Status: DISCONTINUED | OUTPATIENT
Start: 2022-01-01 | End: 2022-01-01

## 2022-01-01 RX ORDER — OXYBUTYNIN CHLORIDE 5 MG/1
5 TABLET, EXTENDED RELEASE ORAL DAILY
Status: CANCELLED | OUTPATIENT
Start: 2022-01-01

## 2022-01-01 RX ORDER — BISACODYL 10 MG
10 SUPPOSITORY, RECTAL RECTAL DAILY PRN
Status: DISCONTINUED | OUTPATIENT
Start: 2022-01-01 | End: 2022-01-01 | Stop reason: HOSPADM

## 2022-01-01 RX ORDER — POTASSIUM CHLORIDE 750 MG/1
30 CAPSULE, EXTENDED RELEASE ORAL ONCE
Status: COMPLETED | OUTPATIENT
Start: 2022-01-01 | End: 2022-01-01

## 2022-01-01 RX ORDER — POTASSIUM CHLORIDE 7.45 MG/ML
10 INJECTION INTRAVENOUS
Status: DISCONTINUED | OUTPATIENT
Start: 2022-01-01 | End: 2022-01-01

## 2022-01-01 RX ORDER — ALBUMIN (HUMAN) 12.5 G/50ML
25 SOLUTION INTRAVENOUS ONCE
Status: COMPLETED | OUTPATIENT
Start: 2022-01-01 | End: 2022-01-01

## 2022-01-01 RX ORDER — CITALOPRAM 20 MG/1
20 TABLET ORAL DAILY
Status: CANCELLED | OUTPATIENT
Start: 2022-01-01

## 2022-01-01 RX ORDER — IPRATROPIUM BROMIDE AND ALBUTEROL SULFATE 2.5; .5 MG/3ML; MG/3ML
3 SOLUTION RESPIRATORY (INHALATION) EVERY 4 HOURS PRN
Status: DISCONTINUED | OUTPATIENT
Start: 2022-01-01 | End: 2022-01-01

## 2022-01-01 RX ORDER — BISACODYL 10 MG
10 SUPPOSITORY, RECTAL RECTAL ONCE
Status: DISCONTINUED | OUTPATIENT
Start: 2022-01-01 | End: 2022-01-01

## 2022-01-01 RX ORDER — MAGNESIUM SULFATE HEPTAHYDRATE 40 MG/ML
4 INJECTION, SOLUTION INTRAVENOUS AS NEEDED
Status: DISCONTINUED | OUTPATIENT
Start: 2022-01-01 | End: 2022-01-01

## 2022-01-01 RX ORDER — FLUORIDE TOOTHPASTE
15 TOOTHPASTE DENTAL
Status: DISCONTINUED | OUTPATIENT
Start: 2022-01-01 | End: 2022-01-01

## 2022-01-01 RX ORDER — ACETAMINOPHEN 325 MG/1
650 TABLET ORAL EVERY 4 HOURS PRN
Status: CANCELLED | OUTPATIENT
Start: 2022-01-01

## 2022-01-01 RX ORDER — LORAZEPAM 2 MG/ML
0.5 INJECTION INTRAMUSCULAR EVERY 4 HOURS PRN
Status: CANCELLED | OUTPATIENT
Start: 2022-01-01

## 2022-01-01 RX ORDER — IPRATROPIUM BROMIDE AND ALBUTEROL SULFATE 2.5; .5 MG/3ML; MG/3ML
3 SOLUTION RESPIRATORY (INHALATION) EVERY 6 HOURS PRN
Status: DISCONTINUED | OUTPATIENT
Start: 2022-01-01 | End: 2022-01-01 | Stop reason: HOSPADM

## 2022-01-01 RX ORDER — ONDANSETRON 4 MG/1
4 TABLET, FILM COATED ORAL EVERY 6 HOURS PRN
Status: DISCONTINUED | OUTPATIENT
Start: 2022-01-01 | End: 2022-01-01

## 2022-01-01 RX ORDER — MORPHINE SULFATE 2 MG/ML
1 INJECTION, SOLUTION INTRAMUSCULAR; INTRAVENOUS
Status: CANCELLED | OUTPATIENT
Start: 2022-01-01 | End: 2022-05-10

## 2022-01-01 RX ORDER — MORPHINE SULFATE 2 MG/ML
1 INJECTION, SOLUTION INTRAMUSCULAR; INTRAVENOUS
Status: DISCONTINUED | OUTPATIENT
Start: 2022-01-01 | End: 2022-01-01 | Stop reason: HOSPADM

## 2022-01-01 RX ORDER — BUMETANIDE 0.25 MG/ML
1 INJECTION INTRAMUSCULAR; INTRAVENOUS EVERY 12 HOURS
Status: COMPLETED | OUTPATIENT
Start: 2022-01-01 | End: 2022-01-01

## 2022-01-01 RX ORDER — AMIODARONE HYDROCHLORIDE 200 MG/1
200 TABLET ORAL EVERY 12 HOURS SCHEDULED
Status: CANCELLED | OUTPATIENT
Start: 2022-01-01

## 2022-01-01 RX ORDER — AZITHROMYCIN 500 MG/1
TABLET, FILM COATED ORAL
COMMUNITY
Start: 2022-01-01

## 2022-01-01 RX ORDER — LORAZEPAM 2 MG/ML
1 INJECTION INTRAMUSCULAR
Status: DISCONTINUED | OUTPATIENT
Start: 2022-01-01 | End: 2022-01-01

## 2022-01-01 RX ORDER — DOCUSATE SODIUM 100 MG/1
200 CAPSULE, LIQUID FILLED ORAL 2 TIMES DAILY
Status: CANCELLED | OUTPATIENT
Start: 2022-01-01

## 2022-01-01 RX ADMIN — ATORVASTATIN CALCIUM 10 MG: 10 TABLET, FILM COATED ORAL at 20:41

## 2022-01-01 RX ADMIN — TAZOBACTAM SODIUM AND PIPERACILLIN SODIUM 4.5 G: 500; 4 INJECTION, SOLUTION INTRAVENOUS at 21:12

## 2022-01-01 RX ADMIN — NYSTATIN 500000 UNITS: 100000 SUSPENSION ORAL at 21:41

## 2022-01-01 RX ADMIN — AMIODARONE HYDROCHLORIDE 200 MG: 200 TABLET ORAL at 21:41

## 2022-01-01 RX ADMIN — IPRATROPIUM BROMIDE AND ALBUTEROL SULFATE 3 ML: .5; 3 SOLUTION RESPIRATORY (INHALATION) at 12:58

## 2022-01-01 RX ADMIN — APIXABAN 5 MG: 5 TABLET, FILM COATED ORAL at 09:28

## 2022-01-01 RX ADMIN — VANCOMYCIN HYDROCHLORIDE 1750 MG: 10 INJECTION, POWDER, LYOPHILIZED, FOR SOLUTION INTRAVENOUS at 23:42

## 2022-01-01 RX ADMIN — Medication 10 ML: at 21:33

## 2022-01-01 RX ADMIN — IPRATROPIUM BROMIDE AND ALBUTEROL SULFATE 3 ML: .5; 3 SOLUTION RESPIRATORY (INHALATION) at 19:48

## 2022-01-01 RX ADMIN — LIDOCAINE 1 PATCH: 50 PATCH CUTANEOUS at 10:27

## 2022-01-01 RX ADMIN — AMIODARONE HYDROCHLORIDE 200 MG: 200 TABLET ORAL at 20:59

## 2022-01-01 RX ADMIN — Medication 250 MG: at 09:08

## 2022-01-01 RX ADMIN — PANTOPRAZOLE SODIUM 40 MG: 40 TABLET, DELAYED RELEASE ORAL at 09:07

## 2022-01-01 RX ADMIN — IPRATROPIUM BROMIDE AND ALBUTEROL SULFATE 3 ML: .5; 3 SOLUTION RESPIRATORY (INHALATION) at 07:33

## 2022-01-01 RX ADMIN — GABAPENTIN 300 MG: 300 CAPSULE ORAL at 05:46

## 2022-01-01 RX ADMIN — METOPROLOL TARTRATE 12.5 MG: 25 TABLET, FILM COATED ORAL at 08:56

## 2022-01-01 RX ADMIN — AMIODARONE HYDROCHLORIDE 200 MG: 200 TABLET ORAL at 08:34

## 2022-01-01 RX ADMIN — PANTOPRAZOLE SODIUM 40 MG: 40 TABLET, DELAYED RELEASE ORAL at 17:17

## 2022-01-01 RX ADMIN — LORAZEPAM 0.5 MG: 2 INJECTION INTRAMUSCULAR; INTRAVENOUS at 14:34

## 2022-01-01 RX ADMIN — APIXABAN 5 MG: 5 TABLET, FILM COATED ORAL at 21:00

## 2022-01-01 RX ADMIN — NYSTATIN 500000 UNITS: 100000 SUSPENSION ORAL at 09:39

## 2022-01-01 RX ADMIN — AMIODARONE HYDROCHLORIDE 200 MG: 200 TABLET ORAL at 21:20

## 2022-01-01 RX ADMIN — GABAPENTIN 300 MG: 300 CAPSULE ORAL at 21:20

## 2022-01-01 RX ADMIN — Medication 5 MG: at 20:41

## 2022-01-01 RX ADMIN — HYDROMORPHONE HYDROCHLORIDE 0.5 MG: 1 INJECTION, SOLUTION INTRAMUSCULAR; INTRAVENOUS; SUBCUTANEOUS at 16:26

## 2022-01-01 RX ADMIN — DOCUSATE SODIUM 200 MG: 100 CAPSULE, LIQUID FILLED ORAL at 08:56

## 2022-01-01 RX ADMIN — MAGNESIUM SULFATE HEPTAHYDRATE 2 G: 2 INJECTION, SOLUTION INTRAVENOUS at 02:27

## 2022-01-01 RX ADMIN — APIXABAN 5 MG: 5 TABLET, FILM COATED ORAL at 08:18

## 2022-01-01 RX ADMIN — LIDOCAINE 1 PATCH: 50 PATCH CUTANEOUS at 08:56

## 2022-01-01 RX ADMIN — HYDROCODONE BITARTRATE AND ACETAMINOPHEN 1 TABLET: 5; 325 TABLET ORAL at 05:40

## 2022-01-01 RX ADMIN — MIDODRINE HYDROCHLORIDE 5 MG: 5 TABLET ORAL at 08:34

## 2022-01-01 RX ADMIN — Medication 10 ML: at 09:59

## 2022-01-01 RX ADMIN — AMIODARONE HYDROCHLORIDE 1 MG/MIN: 1.8 INJECTION, SOLUTION INTRAVENOUS at 01:35

## 2022-01-01 RX ADMIN — AMIODARONE HYDROCHLORIDE 1 MG/MIN: 1.8 INJECTION, SOLUTION INTRAVENOUS at 09:40

## 2022-01-01 RX ADMIN — Medication 5 MG: at 20:26

## 2022-01-01 RX ADMIN — IPRATROPIUM BROMIDE AND ALBUTEROL SULFATE 3 ML: .5; 3 SOLUTION RESPIRATORY (INHALATION) at 15:54

## 2022-01-01 RX ADMIN — PANTOPRAZOLE SODIUM 40 MG: 40 TABLET, DELAYED RELEASE ORAL at 07:59

## 2022-01-01 RX ADMIN — IPRATROPIUM BROMIDE AND ALBUTEROL SULFATE 3 ML: .5; 3 SOLUTION RESPIRATORY (INHALATION) at 15:42

## 2022-01-01 RX ADMIN — ASPIRIN 81 MG: 81 TABLET, COATED ORAL at 09:28

## 2022-01-01 RX ADMIN — MEROPENEM 1 G: 1 INJECTION, POWDER, FOR SOLUTION INTRAVENOUS at 12:40

## 2022-01-01 RX ADMIN — Medication 5 MG: at 21:10

## 2022-01-01 RX ADMIN — METOPROLOL TARTRATE 12.5 MG: 25 TABLET, FILM COATED ORAL at 20:59

## 2022-01-01 RX ADMIN — Medication 10 ML: at 20:26

## 2022-01-01 RX ADMIN — OXYBUTYNIN CHLORIDE 5 MG: 5 TABLET, EXTENDED RELEASE ORAL at 09:05

## 2022-01-01 RX ADMIN — GABAPENTIN 300 MG: 300 CAPSULE ORAL at 05:17

## 2022-01-01 RX ADMIN — ASPIRIN 81 MG: 81 TABLET, COATED ORAL at 08:46

## 2022-01-01 RX ADMIN — SODIUM CHLORIDE 250 ML: 9 INJECTION, SOLUTION INTRAVENOUS at 00:09

## 2022-01-01 RX ADMIN — DOCUSATE SODIUM 200 MG: 100 CAPSULE, LIQUID FILLED ORAL at 20:59

## 2022-01-01 RX ADMIN — Medication 5 MG: at 20:35

## 2022-01-01 RX ADMIN — AMIODARONE HYDROCHLORIDE 200 MG: 200 TABLET ORAL at 20:36

## 2022-01-01 RX ADMIN — GABAPENTIN 300 MG: 300 CAPSULE ORAL at 13:50

## 2022-01-01 RX ADMIN — Medication 250 MG: at 08:17

## 2022-01-01 RX ADMIN — IPRATROPIUM BROMIDE AND ALBUTEROL SULFATE 3 ML: .5; 3 SOLUTION RESPIRATORY (INHALATION) at 12:29

## 2022-01-01 RX ADMIN — HYDROCODONE BITARTRATE AND ACETAMINOPHEN 1 TABLET: 5; 325 TABLET ORAL at 12:59

## 2022-01-01 RX ADMIN — POTASSIUM CHLORIDE 40 MEQ: 750 CAPSULE, EXTENDED RELEASE ORAL at 23:56

## 2022-01-01 RX ADMIN — IPRATROPIUM BROMIDE AND ALBUTEROL SULFATE 3 ML: .5; 3 SOLUTION RESPIRATORY (INHALATION) at 08:18

## 2022-01-01 RX ADMIN — FUROSEMIDE 40 MG: 10 INJECTION, SOLUTION INTRAMUSCULAR; INTRAVENOUS at 09:30

## 2022-01-01 RX ADMIN — TAZOBACTAM SODIUM AND PIPERACILLIN SODIUM 4.5 G: 500; 4 INJECTION, SOLUTION INTRAVENOUS at 13:38

## 2022-01-01 RX ADMIN — METOPROLOL TARTRATE 12.5 MG: 25 TABLET, FILM COATED ORAL at 21:12

## 2022-01-01 RX ADMIN — Medication 5 MG: at 20:44

## 2022-01-01 RX ADMIN — HYDROMORPHONE HYDROCHLORIDE 0.5 MG: 1 INJECTION, SOLUTION INTRAMUSCULAR; INTRAVENOUS; SUBCUTANEOUS at 22:18

## 2022-01-01 RX ADMIN — AMIODARONE HYDROCHLORIDE 200 MG: 200 TABLET ORAL at 21:12

## 2022-01-01 RX ADMIN — Medication 250 MG: at 08:26

## 2022-01-01 RX ADMIN — MIDODRINE HYDROCHLORIDE 5 MG: 5 TABLET ORAL at 13:38

## 2022-01-01 RX ADMIN — APIXABAN 5 MG: 5 TABLET, FILM COATED ORAL at 09:07

## 2022-01-01 RX ADMIN — AMIODARONE HYDROCHLORIDE 200 MG: 200 TABLET ORAL at 08:18

## 2022-01-01 RX ADMIN — Medication 250 MG: at 21:32

## 2022-01-01 RX ADMIN — POTASSIUM CHLORIDE 40 MEQ: 750 CAPSULE, EXTENDED RELEASE ORAL at 13:52

## 2022-01-01 RX ADMIN — IPRATROPIUM BROMIDE AND ALBUTEROL SULFATE 3 ML: .5; 3 SOLUTION RESPIRATORY (INHALATION) at 14:15

## 2022-01-01 RX ADMIN — ASPIRIN 81 MG: 81 TABLET, COATED ORAL at 08:00

## 2022-01-01 RX ADMIN — APIXABAN 5 MG: 5 TABLET, FILM COATED ORAL at 09:39

## 2022-01-01 RX ADMIN — OXYBUTYNIN CHLORIDE 5 MG: 5 TABLET, EXTENDED RELEASE ORAL at 08:00

## 2022-01-01 RX ADMIN — PANTOPRAZOLE SODIUM 40 MG: 40 TABLET, DELAYED RELEASE ORAL at 08:18

## 2022-01-01 RX ADMIN — METOPROLOL TARTRATE 12.5 MG: 25 TABLET, FILM COATED ORAL at 08:34

## 2022-01-01 RX ADMIN — NYSTATIN 500000 UNITS: 100000 SUSPENSION ORAL at 21:09

## 2022-01-01 RX ADMIN — Medication 250 MG: at 21:20

## 2022-01-01 RX ADMIN — PANTOPRAZOLE SODIUM 40 MG: 40 TABLET, DELAYED RELEASE ORAL at 09:29

## 2022-01-01 RX ADMIN — PANTOPRAZOLE SODIUM 40 MG: 40 TABLET, DELAYED RELEASE ORAL at 17:18

## 2022-01-01 RX ADMIN — GUAIFENESIN 600 MG: 600 TABLET, EXTENDED RELEASE ORAL at 02:50

## 2022-01-01 RX ADMIN — Medication 10 ML: at 21:00

## 2022-01-01 RX ADMIN — ATORVASTATIN CALCIUM 10 MG: 10 TABLET, FILM COATED ORAL at 22:45

## 2022-01-01 RX ADMIN — AMIODARONE HYDROCHLORIDE 200 MG: 200 TABLET ORAL at 08:46

## 2022-01-01 RX ADMIN — ASPIRIN 81 MG: 81 TABLET, COATED ORAL at 09:08

## 2022-01-01 RX ADMIN — OXYBUTYNIN CHLORIDE 5 MG: 5 TABLET, EXTENDED RELEASE ORAL at 09:28

## 2022-01-01 RX ADMIN — HYDROMORPHONE HYDROCHLORIDE 0.5 MG: 1 INJECTION, SOLUTION INTRAMUSCULAR; INTRAVENOUS; SUBCUTANEOUS at 05:41

## 2022-01-01 RX ADMIN — TAZOBACTAM SODIUM AND PIPERACILLIN SODIUM 4.5 G: 500; 4 INJECTION, SOLUTION INTRAVENOUS at 06:03

## 2022-01-01 RX ADMIN — ATORVASTATIN CALCIUM 10 MG: 10 TABLET, FILM COATED ORAL at 20:57

## 2022-01-01 RX ADMIN — OXYBUTYNIN CHLORIDE 5 MG: 5 TABLET, EXTENDED RELEASE ORAL at 09:29

## 2022-01-01 RX ADMIN — IPRATROPIUM BROMIDE AND ALBUTEROL SULFATE 3 ML: .5; 3 SOLUTION RESPIRATORY (INHALATION) at 18:43

## 2022-01-01 RX ADMIN — APIXABAN 5 MG: 5 TABLET, FILM COATED ORAL at 21:10

## 2022-01-01 RX ADMIN — FUROSEMIDE 40 MG: 40 TABLET ORAL at 15:41

## 2022-01-01 RX ADMIN — POTASSIUM CHLORIDE 40 MEQ: 750 CAPSULE, EXTENDED RELEASE ORAL at 20:26

## 2022-01-01 RX ADMIN — Medication 250 MG: at 20:58

## 2022-01-01 RX ADMIN — Medication 5 MG: at 21:32

## 2022-01-01 RX ADMIN — MIDODRINE HYDROCHLORIDE 5 MG: 5 TABLET ORAL at 11:15

## 2022-01-01 RX ADMIN — OXYBUTYNIN CHLORIDE 5 MG: 5 TABLET, EXTENDED RELEASE ORAL at 09:39

## 2022-01-01 RX ADMIN — PANTOPRAZOLE SODIUM 40 MG: 40 TABLET, DELAYED RELEASE ORAL at 17:59

## 2022-01-01 RX ADMIN — Medication 10 ML: at 09:32

## 2022-01-01 RX ADMIN — Medication 10 ML: at 22:45

## 2022-01-01 RX ADMIN — APIXABAN 5 MG: 5 TABLET, FILM COATED ORAL at 20:26

## 2022-01-01 RX ADMIN — IPRATROPIUM BROMIDE AND ALBUTEROL SULFATE 3 ML: .5; 3 SOLUTION RESPIRATORY (INHALATION) at 06:36

## 2022-01-01 RX ADMIN — GABAPENTIN 300 MG: 300 CAPSULE ORAL at 22:45

## 2022-01-01 RX ADMIN — METOPROLOL TARTRATE 12.5 MG: 25 TABLET, FILM COATED ORAL at 20:35

## 2022-01-01 RX ADMIN — POTASSIUM CHLORIDE 30 MEQ: 750 CAPSULE, EXTENDED RELEASE ORAL at 09:29

## 2022-01-01 RX ADMIN — ONDANSETRON 4 MG: 2 INJECTION INTRAMUSCULAR; INTRAVENOUS at 18:02

## 2022-01-01 RX ADMIN — MIDODRINE HYDROCHLORIDE 5 MG: 5 TABLET ORAL at 12:39

## 2022-01-01 RX ADMIN — SODIUM CHLORIDE 250 ML: 9 INJECTION, SOLUTION INTRAVENOUS at 21:11

## 2022-01-01 RX ADMIN — GABAPENTIN 300 MG: 300 CAPSULE ORAL at 21:32

## 2022-01-01 RX ADMIN — AMIODARONE HYDROCHLORIDE 200 MG: 200 TABLET ORAL at 09:03

## 2022-01-01 RX ADMIN — METOPROLOL TARTRATE 12.5 MG: 25 TABLET, FILM COATED ORAL at 20:44

## 2022-01-01 RX ADMIN — MIDODRINE HYDROCHLORIDE 5 MG: 5 TABLET ORAL at 12:38

## 2022-01-01 RX ADMIN — Medication 5 MG: at 21:20

## 2022-01-01 RX ADMIN — GABAPENTIN 300 MG: 300 CAPSULE ORAL at 13:32

## 2022-01-01 RX ADMIN — MIDODRINE HYDROCHLORIDE 5 MG: 5 TABLET ORAL at 18:12

## 2022-01-01 RX ADMIN — Medication 250 MG: at 20:44

## 2022-01-01 RX ADMIN — Medication 5 MG: at 21:00

## 2022-01-01 RX ADMIN — Medication 5 MG: at 20:57

## 2022-01-01 RX ADMIN — GABAPENTIN 300 MG: 300 CAPSULE ORAL at 20:58

## 2022-01-01 RX ADMIN — MINERAL OIL: 1000 LIQUID ORAL at 13:29

## 2022-01-01 RX ADMIN — Medication 10 ML: at 21:20

## 2022-01-01 RX ADMIN — MORPHINE SULFATE 5 MG: 20 SOLUTION ORAL at 13:15

## 2022-01-01 RX ADMIN — METOPROLOL TARTRATE 12.5 MG: 25 TABLET, FILM COATED ORAL at 08:46

## 2022-01-01 RX ADMIN — GABAPENTIN 300 MG: 300 CAPSULE ORAL at 20:40

## 2022-01-01 RX ADMIN — APIXABAN 5 MG: 5 TABLET, FILM COATED ORAL at 21:32

## 2022-01-01 RX ADMIN — PANTOPRAZOLE SODIUM 40 MG: 40 TABLET, DELAYED RELEASE ORAL at 08:46

## 2022-01-01 RX ADMIN — APIXABAN 5 MG: 5 TABLET, FILM COATED ORAL at 08:26

## 2022-01-01 RX ADMIN — AMIODARONE HYDROCHLORIDE 200 MG: 200 TABLET ORAL at 08:56

## 2022-01-01 RX ADMIN — ASPIRIN 81 MG: 81 TABLET, COATED ORAL at 09:50

## 2022-01-01 RX ADMIN — ATORVASTATIN CALCIUM 10 MG: 10 TABLET, FILM COATED ORAL at 21:41

## 2022-01-01 RX ADMIN — FUROSEMIDE 60 MG: 10 INJECTION INTRAMUSCULAR; INTRAVENOUS at 17:25

## 2022-01-01 RX ADMIN — MORPHINE SULFATE 1 MG: 2 INJECTION, SOLUTION INTRAMUSCULAR; INTRAVENOUS at 10:12

## 2022-01-01 RX ADMIN — FLUDEOXYGLUCOSE F18 1 DOSE: 300 INJECTION INTRAVENOUS at 12:10

## 2022-01-01 RX ADMIN — SODIUM CHLORIDE 250 ML: 9 INJECTION, SOLUTION INTRAVENOUS at 02:56

## 2022-01-01 RX ADMIN — Medication 250 MG: at 09:28

## 2022-01-01 RX ADMIN — MINERAL OIL: 1000 LIQUID ORAL at 13:08

## 2022-01-01 RX ADMIN — MINERAL OIL: 1000 LIQUID ORAL at 22:18

## 2022-01-01 RX ADMIN — IPRATROPIUM BROMIDE AND ALBUTEROL SULFATE 3 ML: .5; 3 SOLUTION RESPIRATORY (INHALATION) at 20:24

## 2022-01-01 RX ADMIN — BISACODYL 10 MG: 5 TABLET, COATED ORAL at 20:44

## 2022-01-01 RX ADMIN — ATORVASTATIN CALCIUM 10 MG: 10 TABLET, FILM COATED ORAL at 20:34

## 2022-01-01 RX ADMIN — TAZOBACTAM SODIUM AND PIPERACILLIN SODIUM 4.5 G: 500; 4 INJECTION, SOLUTION INTRAVENOUS at 16:46

## 2022-01-01 RX ADMIN — GABAPENTIN 300 MG: 300 CAPSULE ORAL at 21:00

## 2022-01-01 RX ADMIN — AMIODARONE HYDROCHLORIDE 150 MG: 1.5 INJECTION, SOLUTION INTRAVENOUS at 01:20

## 2022-01-01 RX ADMIN — AMIODARONE HYDROCHLORIDE 0.5 MG/MIN: 1.8 INJECTION, SOLUTION INTRAVENOUS at 06:35

## 2022-01-01 RX ADMIN — Medication 250 MG: at 20:25

## 2022-01-01 RX ADMIN — FUROSEMIDE 40 MG: 10 INJECTION, SOLUTION INTRAMUSCULAR; INTRAVENOUS at 20:26

## 2022-01-01 RX ADMIN — GABAPENTIN 300 MG: 300 CAPSULE ORAL at 21:01

## 2022-01-01 RX ADMIN — MEROPENEM 1 G: 1 INJECTION, POWDER, FOR SOLUTION INTRAVENOUS at 12:39

## 2022-01-01 RX ADMIN — PANTOPRAZOLE SODIUM 40 MG: 40 TABLET, DELAYED RELEASE ORAL at 18:24

## 2022-01-01 RX ADMIN — MIDODRINE HYDROCHLORIDE 5 MG: 5 TABLET ORAL at 17:39

## 2022-01-01 RX ADMIN — TAZOBACTAM SODIUM AND PIPERACILLIN SODIUM 4.5 G: 500; 4 INJECTION, SOLUTION INTRAVENOUS at 21:21

## 2022-01-01 RX ADMIN — GABAPENTIN 300 MG: 300 CAPSULE ORAL at 05:31

## 2022-01-01 RX ADMIN — ATORVASTATIN CALCIUM 10 MG: 10 TABLET, FILM COATED ORAL at 21:32

## 2022-01-01 RX ADMIN — ATORVASTATIN CALCIUM 10 MG: 10 TABLET, FILM COATED ORAL at 20:26

## 2022-01-01 RX ADMIN — METOPROLOL TARTRATE 12.5 MG: 25 TABLET, FILM COATED ORAL at 21:41

## 2022-01-01 RX ADMIN — APIXABAN 5 MG: 5 TABLET, FILM COATED ORAL at 20:57

## 2022-01-01 RX ADMIN — PANTOPRAZOLE SODIUM 40 MG: 40 TABLET, DELAYED RELEASE ORAL at 09:28

## 2022-01-01 RX ADMIN — PANTOPRAZOLE SODIUM 40 MG: 40 TABLET, DELAYED RELEASE ORAL at 16:47

## 2022-01-01 RX ADMIN — Medication 5 MG: at 21:41

## 2022-01-01 RX ADMIN — MEROPENEM 1 G: 1 INJECTION, POWDER, FOR SOLUTION INTRAVENOUS at 12:29

## 2022-01-01 RX ADMIN — AMIODARONE HYDROCHLORIDE 200 MG: 200 TABLET ORAL at 20:25

## 2022-01-01 RX ADMIN — MAGNESIUM SULFATE HEPTAHYDRATE 2 G: 2 INJECTION, SOLUTION INTRAVENOUS at 22:46

## 2022-01-01 RX ADMIN — LORAZEPAM 0.5 MG: 2 INJECTION INTRAMUSCULAR; INTRAVENOUS at 08:10

## 2022-01-01 RX ADMIN — Medication 15 ML: at 16:49

## 2022-01-01 RX ADMIN — AMIODARONE HYDROCHLORIDE 200 MG: 200 TABLET ORAL at 09:40

## 2022-01-01 RX ADMIN — PANTOPRAZOLE SODIUM 40 MG: 40 TABLET, DELAYED RELEASE ORAL at 18:12

## 2022-01-01 RX ADMIN — METOPROLOL TARTRATE 12.5 MG: 25 TABLET, FILM COATED ORAL at 21:20

## 2022-01-01 RX ADMIN — NYSTATIN 500000 UNITS: 100000 SUSPENSION ORAL at 17:39

## 2022-01-01 RX ADMIN — BUMETANIDE 1 MG: 0.25 INJECTION, SOLUTION INTRAMUSCULAR; INTRAVENOUS at 19:19

## 2022-01-01 RX ADMIN — BARIUM SULFATE 50 ML: 400 SUSPENSION ORAL at 13:16

## 2022-01-01 RX ADMIN — TAZOBACTAM SODIUM AND PIPERACILLIN SODIUM 4.5 G: 500; 4 INJECTION, SOLUTION INTRAVENOUS at 14:40

## 2022-01-01 RX ADMIN — GABAPENTIN 300 MG: 300 CAPSULE ORAL at 20:43

## 2022-01-01 RX ADMIN — IPRATROPIUM BROMIDE AND ALBUTEROL SULFATE 3 ML: .5; 3 SOLUTION RESPIRATORY (INHALATION) at 16:50

## 2022-01-01 RX ADMIN — IPRATROPIUM BROMIDE AND ALBUTEROL SULFATE 3 ML: .5; 3 SOLUTION RESPIRATORY (INHALATION) at 11:23

## 2022-01-01 RX ADMIN — IPRATROPIUM BROMIDE AND ALBUTEROL SULFATE 3 ML: .5; 3 SOLUTION RESPIRATORY (INHALATION) at 13:11

## 2022-01-01 RX ADMIN — AMIODARONE HYDROCHLORIDE 200 MG: 200 TABLET ORAL at 20:41

## 2022-01-01 RX ADMIN — GLYCOPYRROLATE 0.4 MG: 0.2 INJECTION INTRAMUSCULAR; INTRAVENOUS at 18:31

## 2022-01-01 RX ADMIN — MIDODRINE HYDROCHLORIDE 5 MG: 5 TABLET ORAL at 09:07

## 2022-01-01 RX ADMIN — APIXABAN 5 MG: 5 TABLET, FILM COATED ORAL at 20:41

## 2022-01-01 RX ADMIN — Medication 250 MG: at 09:29

## 2022-01-01 RX ADMIN — Medication 10 ML: at 08:19

## 2022-01-01 RX ADMIN — ASPIRIN 81 MG: 81 TABLET, COATED ORAL at 09:39

## 2022-01-01 RX ADMIN — IPRATROPIUM BROMIDE AND ALBUTEROL SULFATE 3 ML: .5; 3 SOLUTION RESPIRATORY (INHALATION) at 06:35

## 2022-01-01 RX ADMIN — MINERAL OIL: 1000 LIQUID ORAL at 17:31

## 2022-01-01 RX ADMIN — NYSTATIN 500000 UNITS: 100000 SUSPENSION ORAL at 08:18

## 2022-01-01 RX ADMIN — AMIODARONE HYDROCHLORIDE 1 MG/MIN: 1.8 INJECTION, SOLUTION INTRAVENOUS at 19:41

## 2022-01-01 RX ADMIN — ALBUMIN HUMAN 25 G: 0.25 SOLUTION INTRAVENOUS at 00:45

## 2022-01-01 RX ADMIN — Medication 10 ML: at 21:10

## 2022-01-01 RX ADMIN — AMIODARONE HYDROCHLORIDE 0.5 MG/MIN: 1.8 INJECTION, SOLUTION INTRAVENOUS at 07:58

## 2022-01-01 RX ADMIN — Medication 10 ML: at 21:12

## 2022-01-01 RX ADMIN — APIXABAN 5 MG: 5 TABLET, FILM COATED ORAL at 09:03

## 2022-01-01 RX ADMIN — FUROSEMIDE 60 MG: 10 INJECTION INTRAMUSCULAR; INTRAVENOUS at 17:01

## 2022-01-01 RX ADMIN — Medication 250 MG: at 08:46

## 2022-01-01 RX ADMIN — MEROPENEM 1 G: 1 INJECTION, POWDER, FOR SOLUTION INTRAVENOUS at 02:03

## 2022-01-01 RX ADMIN — MIDODRINE HYDROCHLORIDE 5 MG: 5 TABLET ORAL at 08:17

## 2022-01-01 RX ADMIN — NYSTATIN 500000 UNITS: 100000 SUSPENSION ORAL at 09:02

## 2022-01-01 RX ADMIN — NYSTATIN 500000 UNITS: 100000 SUSPENSION ORAL at 13:32

## 2022-01-01 RX ADMIN — ATORVASTATIN CALCIUM 10 MG: 10 TABLET, FILM COATED ORAL at 21:10

## 2022-01-01 RX ADMIN — MIDODRINE HYDROCHLORIDE 5 MG: 5 TABLET ORAL at 03:06

## 2022-01-01 RX ADMIN — PANTOPRAZOLE SODIUM 40 MG: 40 TABLET, DELAYED RELEASE ORAL at 17:55

## 2022-01-01 RX ADMIN — OXYBUTYNIN CHLORIDE 5 MG: 5 TABLET, EXTENDED RELEASE ORAL at 09:07

## 2022-01-01 RX ADMIN — LISINOPRIL 5 MG: 5 TABLET ORAL at 09:49

## 2022-01-01 RX ADMIN — METOPROLOL TARTRATE 12.5 MG: 25 TABLET, FILM COATED ORAL at 09:50

## 2022-01-01 RX ADMIN — MEROPENEM 1 G: 1 INJECTION, POWDER, FOR SOLUTION INTRAVENOUS at 19:13

## 2022-01-01 RX ADMIN — NYSTATIN 500000 UNITS: 100000 SUSPENSION ORAL at 20:36

## 2022-01-01 RX ADMIN — FUROSEMIDE 60 MG: 10 INJECTION INTRAMUSCULAR; INTRAVENOUS at 08:56

## 2022-01-01 RX ADMIN — Medication 10 ML: at 20:44

## 2022-01-01 RX ADMIN — TAZOBACTAM SODIUM AND PIPERACILLIN SODIUM 4.5 G: 500; 4 INJECTION, SOLUTION INTRAVENOUS at 05:03

## 2022-01-01 RX ADMIN — OXYBUTYNIN CHLORIDE 5 MG: 5 TABLET, EXTENDED RELEASE ORAL at 09:50

## 2022-01-01 RX ADMIN — HYDROMORPHONE HYDROCHLORIDE 0.5 MG: 1 INJECTION, SOLUTION INTRAMUSCULAR; INTRAVENOUS; SUBCUTANEOUS at 19:37

## 2022-01-01 RX ADMIN — Medication 10 ML: at 09:08

## 2022-01-01 RX ADMIN — ACETAMINOPHEN 650 MG: 325 TABLET ORAL at 13:14

## 2022-01-01 RX ADMIN — GLYCOPYRROLATE 0.1 MG: 0.2 INJECTION INTRAMUSCULAR; INTRAVENOUS at 10:12

## 2022-01-01 RX ADMIN — GABAPENTIN 300 MG: 300 CAPSULE ORAL at 05:40

## 2022-01-01 RX ADMIN — GABAPENTIN 300 MG: 300 CAPSULE ORAL at 15:07

## 2022-01-01 RX ADMIN — CITALOPRAM 20 MG: 20 TABLET ORAL at 08:27

## 2022-01-01 RX ADMIN — MEROPENEM 1 G: 1 INJECTION, POWDER, FOR SOLUTION INTRAVENOUS at 18:00

## 2022-01-01 RX ADMIN — GABAPENTIN 300 MG: 300 CAPSULE ORAL at 05:42

## 2022-01-01 RX ADMIN — Medication 5 MG: at 21:12

## 2022-01-01 RX ADMIN — APIXABAN 5 MG: 5 TABLET, FILM COATED ORAL at 20:58

## 2022-01-01 RX ADMIN — NYSTATIN 500000 UNITS: 100000 SUSPENSION ORAL at 08:34

## 2022-01-01 RX ADMIN — APIXABAN 5 MG: 5 TABLET, FILM COATED ORAL at 08:34

## 2022-01-01 RX ADMIN — DOCUSATE SODIUM 200 MG: 100 CAPSULE, LIQUID FILLED ORAL at 20:44

## 2022-01-01 RX ADMIN — APIXABAN 5 MG: 5 TABLET, FILM COATED ORAL at 21:20

## 2022-01-01 RX ADMIN — GABAPENTIN 300 MG: 300 CAPSULE ORAL at 06:03

## 2022-01-01 RX ADMIN — DIGOXIN 250 MCG: 0.25 INJECTION INTRAMUSCULAR; INTRAVENOUS at 20:55

## 2022-01-01 RX ADMIN — PANTOPRAZOLE SODIUM 40 MG: 40 TABLET, DELAYED RELEASE ORAL at 17:44

## 2022-01-01 RX ADMIN — IPRATROPIUM BROMIDE AND ALBUTEROL SULFATE 3 ML: .5; 3 SOLUTION RESPIRATORY (INHALATION) at 09:16

## 2022-01-01 RX ADMIN — IPRATROPIUM BROMIDE AND ALBUTEROL SULFATE 3 ML: .5; 3 SOLUTION RESPIRATORY (INHALATION) at 19:00

## 2022-01-01 RX ADMIN — MIDODRINE HYDROCHLORIDE 5 MG: 5 TABLET ORAL at 09:28

## 2022-01-01 RX ADMIN — AMIODARONE HYDROCHLORIDE 200 MG: 200 TABLET ORAL at 21:32

## 2022-01-01 RX ADMIN — DOCUSATE SODIUM 200 MG: 100 CAPSULE, LIQUID FILLED ORAL at 09:07

## 2022-01-01 RX ADMIN — GABAPENTIN 300 MG: 300 CAPSULE ORAL at 09:04

## 2022-01-01 RX ADMIN — ALBUMIN HUMAN 25 G: 0.25 SOLUTION INTRAVENOUS at 06:33

## 2022-01-01 RX ADMIN — AMIODARONE HYDROCHLORIDE 200 MG: 200 TABLET ORAL at 09:28

## 2022-01-01 RX ADMIN — FUROSEMIDE 40 MG: 10 INJECTION, SOLUTION INTRAMUSCULAR; INTRAVENOUS at 13:30

## 2022-01-01 RX ADMIN — Medication 250 MG: at 20:41

## 2022-01-01 RX ADMIN — CITALOPRAM 20 MG: 20 TABLET ORAL at 09:49

## 2022-01-01 RX ADMIN — METOPROLOL TARTRATE 12.5 MG: 25 TABLET, FILM COATED ORAL at 09:28

## 2022-01-01 RX ADMIN — GABAPENTIN 300 MG: 300 CAPSULE ORAL at 05:32

## 2022-01-01 RX ADMIN — FUROSEMIDE 80 MG: 10 INJECTION, SOLUTION INTRAMUSCULAR; INTRAVENOUS at 10:52

## 2022-01-01 RX ADMIN — TAZOBACTAM SODIUM AND PIPERACILLIN SODIUM 4.5 G: 500; 4 INJECTION, SOLUTION INTRAVENOUS at 04:55

## 2022-01-01 RX ADMIN — METOPROLOL TARTRATE 12.5 MG: 25 TABLET, FILM COATED ORAL at 22:45

## 2022-01-01 RX ADMIN — TAZOBACTAM SODIUM AND PIPERACILLIN SODIUM 4.5 G: 500; 4 INJECTION, SOLUTION INTRAVENOUS at 14:19

## 2022-01-01 RX ADMIN — GABAPENTIN 300 MG: 300 CAPSULE ORAL at 21:12

## 2022-01-01 RX ADMIN — AMIODARONE HYDROCHLORIDE 1 MG/MIN: 1.8 INJECTION, SOLUTION INTRAVENOUS at 01:55

## 2022-01-01 RX ADMIN — GABAPENTIN 300 MG: 300 CAPSULE ORAL at 16:47

## 2022-01-01 RX ADMIN — IOPAMIDOL 100 ML: 755 INJECTION, SOLUTION INTRAVENOUS at 15:48

## 2022-01-01 RX ADMIN — APIXABAN 5 MG: 5 TABLET, FILM COATED ORAL at 08:46

## 2022-01-01 RX ADMIN — METOPROLOL TARTRATE 12.5 MG: 25 TABLET, FILM COATED ORAL at 08:16

## 2022-01-01 RX ADMIN — DOCUSATE SODIUM 200 MG: 100 CAPSULE, LIQUID FILLED ORAL at 21:32

## 2022-01-01 RX ADMIN — IPRATROPIUM BROMIDE AND ALBUTEROL SULFATE 3 ML: .5; 3 SOLUTION RESPIRATORY (INHALATION) at 08:27

## 2022-01-01 RX ADMIN — TAZOBACTAM SODIUM AND PIPERACILLIN SODIUM 4.5 G: 500; 4 INJECTION, SOLUTION INTRAVENOUS at 13:49

## 2022-01-01 RX ADMIN — METOPROLOL TARTRATE 12.5 MG: 25 TABLET, FILM COATED ORAL at 08:26

## 2022-01-01 RX ADMIN — PANTOPRAZOLE SODIUM 40 MG: 40 TABLET, DELAYED RELEASE ORAL at 05:17

## 2022-01-01 RX ADMIN — HYDROCODONE BITARTRATE AND ACETAMINOPHEN 1 TABLET: 5; 325 TABLET ORAL at 11:38

## 2022-01-01 RX ADMIN — CITALOPRAM 20 MG: 20 TABLET ORAL at 12:46

## 2022-01-01 RX ADMIN — AMIODARONE HYDROCHLORIDE 200 MG: 200 TABLET ORAL at 09:29

## 2022-01-01 RX ADMIN — Medication 10 ML: at 08:57

## 2022-01-01 RX ADMIN — PANTOPRAZOLE SODIUM 40 MG: 40 TABLET, DELAYED RELEASE ORAL at 17:25

## 2022-01-01 RX ADMIN — POTASSIUM CHLORIDE 40 MEQ: 750 CAPSULE, EXTENDED RELEASE ORAL at 18:09

## 2022-01-01 RX ADMIN — APIXABAN 5 MG: 5 TABLET, FILM COATED ORAL at 09:29

## 2022-01-01 RX ADMIN — TAZOBACTAM SODIUM AND PIPERACILLIN SODIUM 4.5 G: 500; 4 INJECTION, SOLUTION INTRAVENOUS at 20:57

## 2022-01-01 RX ADMIN — ATORVASTATIN CALCIUM 10 MG: 10 TABLET, FILM COATED ORAL at 21:12

## 2022-01-01 RX ADMIN — APIXABAN 5 MG: 5 TABLET, FILM COATED ORAL at 20:36

## 2022-01-01 RX ADMIN — NYSTATIN 500000 UNITS: 100000 SUSPENSION ORAL at 17:17

## 2022-01-01 RX ADMIN — GABAPENTIN 300 MG: 300 CAPSULE ORAL at 18:25

## 2022-01-01 RX ADMIN — GABAPENTIN 300 MG: 300 CAPSULE ORAL at 05:10

## 2022-01-01 RX ADMIN — HYDROMORPHONE HYDROCHLORIDE 0.5 MG: 1 INJECTION, SOLUTION INTRAMUSCULAR; INTRAVENOUS; SUBCUTANEOUS at 08:20

## 2022-01-01 RX ADMIN — DOCUSATE SODIUM 200 MG: 100 CAPSULE, LIQUID FILLED ORAL at 14:01

## 2022-01-01 RX ADMIN — ATORVASTATIN CALCIUM 10 MG: 10 TABLET, FILM COATED ORAL at 20:44

## 2022-01-01 RX ADMIN — METOPROLOL TARTRATE 12.5 MG: 25 TABLET, FILM COATED ORAL at 09:08

## 2022-01-01 RX ADMIN — PANTOPRAZOLE SODIUM 40 MG: 40 TABLET, DELAYED RELEASE ORAL at 05:34

## 2022-01-01 RX ADMIN — GABAPENTIN 300 MG: 300 CAPSULE ORAL at 20:25

## 2022-01-01 RX ADMIN — TAZOBACTAM SODIUM AND PIPERACILLIN SODIUM 4.5 G: 500; 4 INJECTION, SOLUTION INTRAVENOUS at 21:41

## 2022-01-01 RX ADMIN — APIXABAN 5 MG: 5 TABLET, FILM COATED ORAL at 21:12

## 2022-01-01 RX ADMIN — OXYBUTYNIN CHLORIDE 5 MG: 5 TABLET, EXTENDED RELEASE ORAL at 09:59

## 2022-01-01 RX ADMIN — FUROSEMIDE 40 MG: 40 TABLET ORAL at 08:46

## 2022-01-01 RX ADMIN — Medication 10 ML: at 08:48

## 2022-01-01 RX ADMIN — BUMETANIDE 1 MG: 0.25 INJECTION, SOLUTION INTRAMUSCULAR; INTRAVENOUS at 05:16

## 2022-01-01 RX ADMIN — IPRATROPIUM BROMIDE AND ALBUTEROL SULFATE 3 ML: .5; 3 SOLUTION RESPIRATORY (INHALATION) at 07:38

## 2022-01-01 RX ADMIN — METHYLPREDNISOLONE SODIUM SUCCINATE 40 MG: 40 INJECTION, POWDER, FOR SOLUTION INTRAMUSCULAR; INTRAVENOUS at 00:52

## 2022-01-01 RX ADMIN — IPRATROPIUM BROMIDE AND ALBUTEROL SULFATE 3 ML: .5; 3 SOLUTION RESPIRATORY (INHALATION) at 20:20

## 2022-01-01 RX ADMIN — MIDODRINE HYDROCHLORIDE 5 MG: 5 TABLET ORAL at 19:03

## 2022-01-01 RX ADMIN — NYSTATIN 500000 UNITS: 100000 SUSPENSION ORAL at 11:15

## 2022-01-01 RX ADMIN — POLYETHYLENE GLYCOL 3350 17 G: 17 POWDER, FOR SOLUTION ORAL at 09:07

## 2022-01-01 RX ADMIN — LIDOCAINE 1 PATCH: 50 PATCH CUTANEOUS at 09:09

## 2022-01-01 RX ADMIN — METOPROLOL TARTRATE 12.5 MG: 25 TABLET, FILM COATED ORAL at 09:29

## 2022-01-01 RX ADMIN — IPRATROPIUM BROMIDE AND ALBUTEROL SULFATE 3 ML: .5; 3 SOLUTION RESPIRATORY (INHALATION) at 18:36

## 2022-01-01 RX ADMIN — NYSTATIN 500000 UNITS: 100000 SUSPENSION ORAL at 20:57

## 2022-01-01 RX ADMIN — TAZOBACTAM SODIUM AND PIPERACILLIN SODIUM 4.5 G: 500; 4 INJECTION, SOLUTION INTRAVENOUS at 05:42

## 2022-01-01 RX ADMIN — HYDROMORPHONE HYDROCHLORIDE 0.5 MG: 1 INJECTION, SOLUTION INTRAMUSCULAR; INTRAVENOUS; SUBCUTANEOUS at 13:08

## 2022-01-01 RX ADMIN — Medication 250 MG: at 20:59

## 2022-01-01 RX ADMIN — AMIODARONE HYDROCHLORIDE 200 MG: 200 TABLET ORAL at 08:26

## 2022-01-01 RX ADMIN — MINERAL OIL: 1000 LIQUID ORAL at 08:21

## 2022-01-01 RX ADMIN — Medication 10 ML: at 20:58

## 2022-01-01 RX ADMIN — MEROPENEM 1 G: 1 INJECTION, POWDER, FOR SOLUTION INTRAVENOUS at 12:37

## 2022-01-01 RX ADMIN — GLYCOPYRROLATE 0.4 MG: 0.2 INJECTION INTRAMUSCULAR; INTRAVENOUS at 14:34

## 2022-01-01 RX ADMIN — APIXABAN 5 MG: 5 TABLET, FILM COATED ORAL at 21:41

## 2022-01-01 RX ADMIN — MIDODRINE HYDROCHLORIDE 5 MG: 5 TABLET ORAL at 17:17

## 2022-01-01 RX ADMIN — AMIODARONE HYDROCHLORIDE 200 MG: 200 TABLET ORAL at 20:44

## 2022-01-01 RX ADMIN — ASPIRIN 81 MG: 81 TABLET, COATED ORAL at 09:29

## 2022-01-01 RX ADMIN — ATORVASTATIN CALCIUM 10 MG: 10 TABLET, FILM COATED ORAL at 20:59

## 2022-01-01 RX ADMIN — HYDROMORPHONE HYDROCHLORIDE 0.5 MG: 1 INJECTION, SOLUTION INTRAMUSCULAR; INTRAVENOUS; SUBCUTANEOUS at 06:52

## 2022-01-01 RX ADMIN — HYDROMORPHONE HYDROCHLORIDE 0.5 MG: 1 INJECTION, SOLUTION INTRAMUSCULAR; INTRAVENOUS; SUBCUTANEOUS at 00:47

## 2022-01-01 RX ADMIN — NYSTATIN 500000 UNITS: 100000 SUSPENSION ORAL at 13:38

## 2022-01-01 RX ADMIN — Medication 5 MG: at 20:59

## 2022-01-01 RX ADMIN — FUROSEMIDE 60 MG: 10 INJECTION INTRAMUSCULAR; INTRAVENOUS at 08:27

## 2022-01-01 RX ADMIN — PANTOPRAZOLE SODIUM 40 MG: 40 TABLET, DELAYED RELEASE ORAL at 09:02

## 2022-01-01 RX ADMIN — MIDODRINE HYDROCHLORIDE 5 MG: 5 TABLET ORAL at 09:08

## 2022-01-01 RX ADMIN — POTASSIUM CHLORIDE 40 MEQ: 750 CAPSULE, EXTENDED RELEASE ORAL at 09:21

## 2022-01-01 RX ADMIN — AMIODARONE HYDROCHLORIDE 200 MG: 200 TABLET ORAL at 15:17

## 2022-01-01 RX ADMIN — PANTOPRAZOLE SODIUM 40 MG: 40 TABLET, DELAYED RELEASE ORAL at 09:39

## 2022-01-01 RX ADMIN — BUMETANIDE 1 MG: 0.25 INJECTION, SOLUTION INTRAMUSCULAR; INTRAVENOUS at 05:35

## 2022-01-01 RX ADMIN — OXYBUTYNIN CHLORIDE 5 MG: 5 TABLET, EXTENDED RELEASE ORAL at 08:18

## 2022-01-01 RX ADMIN — METOPROLOL TARTRATE 12.5 MG: 25 TABLET, FILM COATED ORAL at 20:41

## 2022-01-01 RX ADMIN — OXYBUTYNIN CHLORIDE 5 MG: 5 TABLET, EXTENDED RELEASE ORAL at 08:26

## 2022-01-01 RX ADMIN — MIDODRINE HYDROCHLORIDE 5 MG: 5 TABLET ORAL at 13:31

## 2022-01-01 RX ADMIN — TAZOBACTAM SODIUM AND PIPERACILLIN SODIUM 4.5 G: 500; 4 INJECTION, SOLUTION INTRAVENOUS at 06:13

## 2022-01-01 RX ADMIN — ATORVASTATIN CALCIUM 10 MG: 10 TABLET, FILM COATED ORAL at 21:20

## 2022-01-01 RX ADMIN — TAZOBACTAM SODIUM AND PIPERACILLIN SODIUM 4.5 G: 500; 4 INJECTION, SOLUTION INTRAVENOUS at 20:37

## 2022-01-01 RX ADMIN — MIDODRINE HYDROCHLORIDE 5 MG: 5 TABLET ORAL at 16:58

## 2022-01-01 RX ADMIN — BARIUM SULFATE 50 ML: 0.81 POWDER, FOR SUSPENSION ORAL at 13:16

## 2022-01-01 RX ADMIN — METOPROLOL TARTRATE 12.5 MG: 25 TABLET, FILM COATED ORAL at 08:59

## 2022-01-01 RX ADMIN — GABAPENTIN 300 MG: 300 CAPSULE ORAL at 05:34

## 2022-01-01 RX ADMIN — Medication 10 ML: at 09:40

## 2022-01-01 RX ADMIN — IPRATROPIUM BROMIDE AND ALBUTEROL SULFATE 3 ML: .5; 3 SOLUTION RESPIRATORY (INHALATION) at 08:13

## 2022-01-01 RX ADMIN — FUROSEMIDE 40 MG: 40 TABLET ORAL at 09:03

## 2022-01-01 RX ADMIN — APIXABAN 5 MG: 5 TABLET, FILM COATED ORAL at 09:59

## 2022-01-01 RX ADMIN — FUROSEMIDE 40 MG: 40 TABLET ORAL at 08:34

## 2022-01-01 RX ADMIN — Medication 250 MG: at 20:37

## 2022-01-01 RX ADMIN — Medication 10 ML: at 09:50

## 2022-01-01 RX ADMIN — IPRATROPIUM BROMIDE AND ALBUTEROL SULFATE 3 ML: .5; 3 SOLUTION RESPIRATORY (INHALATION) at 12:45

## 2022-01-01 RX ADMIN — PANTOPRAZOLE SODIUM 40 MG: 40 TABLET, DELAYED RELEASE ORAL at 08:37

## 2022-01-01 RX ADMIN — MIDODRINE HYDROCHLORIDE 5 MG: 5 TABLET ORAL at 09:39

## 2022-01-01 RX ADMIN — OXYBUTYNIN CHLORIDE 5 MG: 5 TABLET, EXTENDED RELEASE ORAL at 08:34

## 2022-01-01 RX ADMIN — ASPIRIN 81 MG: 81 TABLET, COATED ORAL at 09:05

## 2022-01-01 RX ADMIN — DOCUSATE SODIUM 200 MG: 100 CAPSULE, LIQUID FILLED ORAL at 08:27

## 2022-01-01 RX ADMIN — DIGOXIN 250 MCG: 0.25 INJECTION INTRAMUSCULAR; INTRAVENOUS at 11:14

## 2022-01-01 RX ADMIN — CITALOPRAM 20 MG: 20 TABLET ORAL at 09:28

## 2022-01-01 RX ADMIN — NYSTATIN 500000 UNITS: 100000 SUSPENSION ORAL at 16:48

## 2022-01-01 RX ADMIN — IPRATROPIUM BROMIDE AND ALBUTEROL SULFATE 3 ML: .5; 3 SOLUTION RESPIRATORY (INHALATION) at 07:30

## 2022-01-01 RX ADMIN — GABAPENTIN 300 MG: 300 CAPSULE ORAL at 07:59

## 2022-01-01 RX ADMIN — AMIODARONE HYDROCHLORIDE 0.5 MG/MIN: 1.8 INJECTION, SOLUTION INTRAVENOUS at 13:42

## 2022-01-01 RX ADMIN — CITALOPRAM 20 MG: 20 TABLET ORAL at 09:59

## 2022-01-01 RX ADMIN — ASPIRIN 81 MG: 81 TABLET, COATED ORAL at 09:59

## 2022-01-01 RX ADMIN — APIXABAN 5 MG: 5 TABLET, FILM COATED ORAL at 08:56

## 2022-01-01 RX ADMIN — MEROPENEM 1 G: 1 INJECTION, POWDER, FOR SOLUTION INTRAVENOUS at 03:14

## 2022-01-01 RX ADMIN — Medication 10 ML: at 11:15

## 2022-01-01 RX ADMIN — Medication 250 MG: at 13:48

## 2022-01-01 RX ADMIN — APIXABAN 5 MG: 5 TABLET, FILM COATED ORAL at 08:00

## 2022-01-01 RX ADMIN — Medication 250 MG: at 21:12

## 2022-01-01 RX ADMIN — ACETAMINOPHEN 650 MG: 325 TABLET ORAL at 15:28

## 2022-01-01 RX ADMIN — CITALOPRAM 20 MG: 20 TABLET ORAL at 08:56

## 2022-01-01 RX ADMIN — ATORVASTATIN CALCIUM 10 MG: 10 TABLET, FILM COATED ORAL at 20:58

## 2022-01-01 RX ADMIN — IPRATROPIUM BROMIDE AND ALBUTEROL SULFATE 3 ML: .5; 3 SOLUTION RESPIRATORY (INHALATION) at 08:15

## 2022-01-01 RX ADMIN — Medication 250 MG: at 08:34

## 2022-01-01 RX ADMIN — Medication 10 ML: at 09:05

## 2022-01-01 RX ADMIN — METOPROLOL TARTRATE 12.5 MG: 25 TABLET, FILM COATED ORAL at 21:32

## 2022-01-01 RX ADMIN — CITALOPRAM 20 MG: 20 TABLET ORAL at 09:07

## 2022-01-01 RX ADMIN — DOCUSATE SODIUM 200 MG: 100 CAPSULE, LIQUID FILLED ORAL at 21:20

## 2022-01-01 RX ADMIN — MAGNESIUM SULFATE HEPTAHYDRATE 2 G: 2 INJECTION, SOLUTION INTRAVENOUS at 00:09

## 2022-01-01 RX ADMIN — ASPIRIN 81 MG: 81 TABLET, COATED ORAL at 08:26

## 2022-01-01 RX ADMIN — NYSTATIN 500000 UNITS: 100000 SUSPENSION ORAL at 18:11

## 2022-01-01 RX ADMIN — FUROSEMIDE 60 MG: 10 INJECTION INTRAMUSCULAR; INTRAVENOUS at 09:07

## 2022-01-01 RX ADMIN — TAZOBACTAM SODIUM AND PIPERACILLIN SODIUM 4.5 G: 500; 4 INJECTION, SOLUTION INTRAVENOUS at 22:46

## 2022-01-01 RX ADMIN — FUROSEMIDE 60 MG: 10 INJECTION INTRAMUSCULAR; INTRAVENOUS at 17:55

## 2022-01-01 RX ADMIN — ATORVASTATIN CALCIUM 10 MG: 10 TABLET, FILM COATED ORAL at 21:00

## 2022-01-01 RX ADMIN — Medication 5 MG: at 22:45

## 2022-01-01 RX ADMIN — GABAPENTIN 300 MG: 300 CAPSULE ORAL at 14:40

## 2022-01-01 RX ADMIN — BUMETANIDE 1 MG: 0.25 INJECTION, SOLUTION INTRAMUSCULAR; INTRAVENOUS at 18:24

## 2022-01-01 RX ADMIN — BARIUM SULFATE 20 ML: 400 PASTE ORAL at 13:16

## 2022-01-01 RX ADMIN — IPRATROPIUM BROMIDE AND ALBUTEROL SULFATE 3 ML: .5; 3 SOLUTION RESPIRATORY (INHALATION) at 15:53

## 2022-01-01 RX ADMIN — AMIODARONE HYDROCHLORIDE 200 MG: 200 TABLET ORAL at 09:09

## 2022-01-01 RX ADMIN — GABAPENTIN 300 MG: 300 CAPSULE ORAL at 21:10

## 2022-01-01 RX ADMIN — ASPIRIN 81 MG: 81 TABLET, COATED ORAL at 08:38

## 2022-01-01 RX ADMIN — OXYBUTYNIN CHLORIDE 5 MG: 5 TABLET, EXTENDED RELEASE ORAL at 08:46

## 2022-01-01 RX ADMIN — MEROPENEM 1 G: 1 INJECTION, POWDER, FOR SOLUTION INTRAVENOUS at 18:28

## 2022-01-01 RX ADMIN — ACETAMINOPHEN 650 MG: 325 TABLET ORAL at 08:26

## 2022-01-01 RX ADMIN — CITALOPRAM 20 MG: 20 TABLET ORAL at 08:48

## 2022-01-01 RX ADMIN — PANTOPRAZOLE SODIUM 40 MG: 40 TABLET, DELAYED RELEASE ORAL at 16:58

## 2022-01-01 RX ADMIN — ACETAMINOPHEN 650 MG: 325 TABLET ORAL at 05:34

## 2022-01-01 RX ADMIN — CITALOPRAM 20 MG: 20 TABLET ORAL at 09:29

## 2022-01-01 RX ADMIN — NYSTATIN 500000 UNITS: 100000 SUSPENSION ORAL at 07:59

## 2022-01-01 RX ADMIN — MEROPENEM 1 G: 1 INJECTION, POWDER, FOR SOLUTION INTRAVENOUS at 02:01

## 2022-01-01 RX ADMIN — APIXABAN 5 MG: 5 TABLET, FILM COATED ORAL at 20:44

## 2022-01-01 RX ADMIN — ASPIRIN 81 MG: 81 TABLET, COATED ORAL at 08:17

## 2022-01-01 RX ADMIN — Medication 10 ML: at 08:38

## 2022-01-01 RX ADMIN — Medication 10 ML: at 08:29

## 2022-01-01 RX ADMIN — GABAPENTIN 300 MG: 300 CAPSULE ORAL at 14:01

## 2022-03-18 NOTE — TELEPHONE ENCOUNTER
Caller: TOSHIA DAVIES    Relationship: [employer]    Best call back number: [phone]      What was the call regarding: DAUGHTER IN [employer] CALLED STATED PATIENT WAS IN THE HOSPITAL @ [facility]. THEY WERE WANTING TO SEE IF THE HOSPITAL CALLED DR HENDERSON YET       Do you require a callback: YES

## 2022-03-18 NOTE — TELEPHONE ENCOUNTER
I returned Radha's phone call.  She wanted to let us know that patient was in hospital at [facility] after her oxygen levels were in the 70's last night.  She said her covid was negative and ct scans show a mass on her lung and she has blood clots as well as pneumonia in her lung.  She asked if we can get her transferred to [facility] and I advised they have to discuss that with [facility] as we have no control over that.  She said they already tried and there was no beds at [facility].  I advised her that they will have to keep requesting and asked that she keep me up to date on when she gets out etc.

## 2022-03-21 NOTE — TELEPHONE ENCOUNTER
Received records and reviewed scan result with Dr. Mirza. He advised to get a pet scan on her and have her f/u with us in 2 weeks.  I called and let Doron know.

## 2022-03-21 NOTE — TELEPHONE ENCOUNTER
Provider: DR HENDERSON    Caller: MEENU    Relationship to Patient: SON    Reason for Call: MEENU IS CALLING STATES THAT REDD WAS IN ICU IN [place] FOR A FEW DAYS ON Thursday.    SHE HAD A CT SCAN WHILE THERE.    THEY FOUND PNEUMONIA, SPOT ON LUNG AND COLON. BLOOD CLOT IN EACH LUNG.  SHE IS ON SOME MEDICATIONS FOR THIS.      THEY ARE LOOKING FOR A PULMONOLOGIST NOW. IF DR HENDERSON KNOWS OF ANY.     REDD HAS A CT SCAN SCHEDULED ON April 1 AND MEENU STATES THAT SHE MAY NEED A PET SCAN INSTEAD.    MEENU ALSO STATES IS THAT REDD NEEDS TO FOLLOW UP WITH DR HENDERSON THIS WEEK.    PLEASE ADVISE.

## 2022-03-22 NOTE — TELEPHONE ENCOUNTER
Spoke with Doron letting him know about the Pet Scan and rescheduled follow up to be after the Pet Scan on 3/29/22.

## 2022-03-29 NOTE — PROGRESS NOTES
DATE OF VISIT: 3/29/2022    REASON FOR VISIT: Followup for right lung cancer     PROBLEM LIST:  1. Right middle lobe lung adenocarcinoma, [id number] stage IIA PD-L1 +10%:  A. Presented with abnormal chest x-ray  B. Ct chest showed 4.3 cm right middle lobe lung cancer.  C. Ct guided biopsy done 10/25/19 showed moderately differentiated adenocarcinoma  D.  MRI brain whole-body PET scan done on November 2019 did not show any evidence of metastatic disease  E.  Treated with CyberKnife radiotherapy.  F.  Isolated left adrenal recurrence: Status post CyberKnife radiotherapy done end of August 2021  2.  Coronary artery disease  3.  Hypertension  4.  Peripheral neuropathy  5. Multiple myeloma:  A.  Treated on clinical trial at the present [facility] using regimen containing thalidomide.  B.  Has been remission for the last 10 years  6.  Pulmonary embolism    HISTORY OF PRESENT ILLNESS: The patient is a very pleasant 86 y.o. female  with past medical history significant for right lung cancer diagnosed October 2019.  Patient is status post CyberKnife radiotherapy for lung mass as well as as a recurrence August 2021. The patient is here today with a new complaint.    SUBJECTIVE: The patient is here today with her son.  She was admitted to outside facility with shortness of breath.  She was found to have bilateral PEs.  She also found to have right lower lobe infiltrate.  She was treated with Eliquis as well as antibiotics.  She was discharged home on oxygen.  She is complaining of fatigue.    Past History:  Medical History: has a past medical history of Constipation, GERD (gastroesophageal reflux disease), H/O multiple myeloma, Hypertension, Incontinence of urine, Lung cancer (HCC), Primary adenocarcinoma of middle lobe of right lung (HCC), Stroke (HCC) (2014), Tendonitis, and TIA (transient ischemic attack).   Surgical History: has a past surgical history that includes Multiple tooth extractions; Cyberknife (12/23/2019); Lung  biopsy; Stomach surgery; Gold Seed Fiducial Placement; and Cyberknife (08/30/2021).   Family History: family history includes Cancer in her father; Melanoma in her brother; Stroke in her mother.   Social History: reports that she quit smoking about 42 years ago. She has never used smokeless tobacco. She reports that she does not drink alcohol and does not use drugs.    (Not in a hospital admission)     Allergies: Patient has no known allergies.     Review of Systems   All other systems reviewed and are negative.        Current Outpatient Medications:   •  alendronate (FOSAMAX) 70 MG tablet, TAKE 1 TABLET BY MOUTH ONE TIME A WEEK, Disp: , Rfl:   •  amoxicillin-clavulanate (AUGMENTIN) 875-125 MG per tablet, Take 1 tablet by mouth Every 12 (Twelve) Hours., Disp: , Rfl:   •  aspirin 81 MG tablet, Take 81 mg by mouth., Disp: , Rfl:   •  atorvastatin (LIPITOR) 10 MG tablet, Take 10 mg by mouth., Disp: , Rfl:   •  azithromycin (ZITHROMAX) 500 MG tablet, TAKE 1 TABLET BY MOUTH EVERY DAY UNTIL GONE, Disp: , Rfl:   •  citalopram (CeleXA) 20 MG tablet, Take 20 mg by mouth., Disp: , Rfl:   •  Eliquis 5 MG tablet tablet, TAKE 1 TABLET BY MOUTH EVERY TWELVE HOURS when 10mg twice daily loading dose has been completed, Disp: , Rfl:   •  gabapentin (NEURONTIN) 300 MG capsule, TAKE 1 CAPSULE BY MOUTH THREE TIMES A DAY FOR NEUROPATHY, Disp: , Rfl:   •  HM OMEGA-3-6-9 FATTY ACIDS capsule, Take 1,200 mg by mouth., Disp: , Rfl:   •  lisinopril (PRINIVIL,ZESTRIL) 5 MG tablet, Take 5 mg by mouth Daily. for blood pressure., Disp: , Rfl:   •  melatonin 5 MG tablet tablet, Take 5 mg by mouth At Night As Needed., Disp: , Rfl:   •  metoprolol tartrate (LOPRESSOR) 12.5 MG half tablet, Take 12.5 mg by mouth Every 12 (Twelve) Hours., Disp: , Rfl:   •  ondansetron ODT (Zofran ODT) 8 MG disintegrating tablet, Place 1 tablet on the tongue Every 8 (Eight) Hours As Needed for Nausea or Vomiting., Disp: 10 tablet, Rfl: 0  •  prednisoLONE acetate (PRED  "FORTE) 1 % ophthalmic suspension, INSTILL 1 DROP INTO THE AFFECTED EYE(S) 4 TIMES DAILY FOR 4 DAYS THEN TWICE DAILY FOR 4 DAYS. START AFTER YAG, Disp: , Rfl:   •  RABEprazole (ACIPHEX) 20 MG EC tablet, Take 20 mg by mouth., Disp: , Rfl:   •  tolterodine LA (DETROL LA) 4 MG 24 hr capsule, TAKE 1 CAPSULE BY MOUTH AT BEDTIME FOR BLADDER, Disp: , Rfl:   No current facility-administered medications for this visit.    PHYSICAL EXAMINATION:   /69   Pulse 79   Temp 96 °F (35.6 °C) (Temporal)   Resp 18   Ht 167.6 cm (65.98\")   Wt 81.2 kg (179 lb)   SpO2 96%   BMI 28.91 kg/m²    Pain Score    03/29/22 1421   PainSc: 0-No pain        ECOG score: 2            ECOG Performance Status: 3 - Symptomatic, >50% confined to bed  General Appearance:  alert, cooperative, no apparent distress and appears stated age   Neurologic/Psychiatric: A&O x 3, gait steady, appropriate affect, strength 5/5 in all muscle groups   HEENT:  Normocephalic, without obvious abnormality, mucous membranes moist   Neck: Supple, symmetrical, trachea midline, no adenopathy;  No thyromegaly, masses, or tenderness   Lungs:   Clear to auscultation bilaterally; respirations regular, even, and unlabored bilaterally   Heart:  Regular rate and rhythm, no murmurs appreciated   Abdomen:   Soft, non-tender, non-distended and no organomegaly   Lymph nodes: No cervical, supraclavicular, inguinal or axillary adenopathy noted   Extremities: Normal, atraumatic; no clubbing, cyanosis, or edema    Skin: No rashes, ulcers, or suspicious lesions noted     Lab on 03/29/2022   Component Date Value Ref Range Status   • WBC 03/29/2022 9.00  3.40 - 10.80 10*3/mm3 Final   • RBC 03/29/2022 3.70 (A) 3.77 - 5.28 10*6/mm3 Final   • Hemoglobin 03/29/2022 10.3 (A) 12.0 - 15.9 g/dL Final   • Hematocrit 03/29/2022 31.8 (A) 34.0 - 46.6 % Final   • RDW 03/29/2022 16.7 (A) 12.3 - 15.4 % Final   • MCV 03/29/2022 85.8  79.0 - 97.0 fL Final   • MCH 03/29/2022 27.9  26.6 - 33.0 pg Final "   • MCHC 03/29/2022 32.6  31.5 - 35.7 g/dL Final   • MPV 03/29/2022 7.9  6.0 - 12.0 fL Final   • Platelets 03/29/2022 300  140 - 450 10*3/mm3 Final   • Neutrophil % 03/29/2022 86.6 (A) 42.7 - 76.0 % Final   • Lymphocyte % 03/29/2022 11.3 (A) 19.6 - 45.3 % Final   • Monocyte % 03/29/2022 2.1 (A) 5.0 - 12.0 % Final   • Neutrophils, Absolute 03/29/2022 7.80 (A) 1.70 - 7.00 10*3/mm3 Final   • Lymphocytes, Absolute 03/29/2022 1.00  0.70 - 3.10 10*3/mm3 Final   • Monocytes, Absolute 03/29/2022 0.20  0.10 - 0.90 10*3/mm3 Final   Hospital Outpatient Visit on 03/29/2022   Component Date Value Ref Range Status   • Glucose 03/29/2022 95  70 - 130 mg/dL Final    Meter: [id number] : [id number] [place]        XR outside films    Result Date: 3/23/2022  Narrative: This procedure was auto-finalized with no dictation required.    NM PET/CT Skull Base to Mid Thigh    Result Date: 3/29/2022  Narrative: DATE OF EXAM: 3/29/2022 12:24 PM  PROCEDURE: NM PET/CT SKULL BASE TO MID THIGH-  INDICATIONS: re-staging scans, New spiculated left upper lobe nodule measure outside facility; C34.2-Malignant neoplasm of middle lobe, bronchus or lung  TECHNIQUE: 11.12 mCi of F-18 labeled FDG was administered intravenously followed by PET imaging from the skull base through the mid thighs. Low-dose non contrast CT imaging of the same body region was performed. Fused PET-CT images and 3D MIP PET images were utilized for interpretation. Blood glucose level at the time of injection was 95 mg/dl.  COMPARISON: Subsequent exam for follow-up with prior PET/CT dated 7/6/2021  HEAD AND NECK: Physiologic hypermetabolism of the aerodigestive tract structures is present, without hypermetabolic cervical adenopathy or aerodigestive tract mass. Normal hypermetabolism of the cerebral hemispheres.  CHEST: There is physiologic hypermetabolism of the left ventricular myocardium. Areas of somewhat nodular scarring and volume loss near the left lung apex are not  hypermetabolic, maximum SUV 2.1, similar to comparison. There is new, fairly extensive right lower lobe consolidation and volume loss adjacent to a moderate-sized pleural effusion, with appearance favoring infectious or inflammatory process such as pneumonia. There is nonfocal increased FDG activity with maximum SUV 3.4. There is otherwise no new distinct focal hypermetabolic pulmonary nodularity or mediastinal adenopathy.  ABDOMEN AND PELVIS: There is physiologic activity of the gastrointestinal and genitourinary tracts, without focal hypermetabolism concerning for acute or neoplastic process. There is evidence of treatment response with resolved hypermetabolism associated with the previously identified left adrenal lesion, also decreased in size on CT.  Osseous structures are unremarkable diffusely.      Impression: Findings overall likely representing treatment response with resolved enlargement and FDG hypermetabolism of the left adrenal lesion.  New areas of right lower lobe consolidation and volume loss are present adjacent to a moderate-sized right pleural effusion. There is some nonfocal associated FDG activity, maximum SUV 3.4. Appearance overall suggests infectious/inflammatory process, possible combination of postradiation effects and pneumonia. Consider follow-up CT imaging to exclude underlying suspicious nodularity.  This report was finalized on 3/29/2022 2:09 PM by Robert Ashley.      CT outside films    Result Date: 3/23/2022  Narrative: This procedure was auto-finalized with no dictation required.    CT outside films    Result Date: 3/23/2022  Narrative: This procedure was auto-finalized with no dictation required.      ASSESSMENT: The patient is a very pleasant 86 y.o. female  with metastatic right lung cancer      PLAN:    1.  Metastatic right lung cancer:  A.  I did go over the PET scan results with the patient from today, I reviewed the films myself, I went over the pictures with her and her  son.  Left adrenal activity has resolved.  She does have new right lower lobe lung infiltrate with a small right pleural effusion radiologist favors infection versus inflammation.  B.  I will do watchful waiting at this point.  I will repeat her CT chest in 6 weeks.  C.  I will consider bronchoscopy with biopsy at that point if no improvement.  Also we can attempt right thoracentesis with cytology.    2.  Pulmonary embolism:  April continue Eliquis 5 mg twice daily    3.  Hypoxia:  A.  The patient oxygen level today was 90% resting, dropped to 81% with minimal activity improved to 94% with 2 L of oxygen.  B.  I will order her home oxygen.    4.  Hypertension:  A.  The patient will continue metoprolol twice a day.    FOLLOW UP: 6 weeks with CT chest without contrast    Andrzej Mirza MD  3/29/2022

## 2022-04-01 NOTE — ED PROVIDER NOTES
Subjective   86-year-old female presents emergency door today after having a fall.  She states that she fell earlier this morning trendy to the bathroom tripped over her oxygen hose.  She landed on her right side has some right rib pain.  She did hit her head on the way down states she had a bruise to the right side of her head.  She had no nausea no vomiting.  No blood from ears or nose.  She has a very minimal headache and no new neck pain but has chronic neck pain.  She reports that she does not feel short of breath.  She has no other complaints she does have pain with movement.  She has a past medical history significant for multiple myeloma and previous lung cancer sees Dr. Mirza      History provided by:  Patient   used: No    Fall  Mechanism of injury: fall    Injury location:  Torso and head/neck  Head/neck injury location:  Head  Torso injury location: Right chest wall.  Incident location:  Home  Arrived directly from scene: yes    Fall:     Point of impact:  Head    Entrapped after fall: no    Suspicion of alcohol use: no    Suspicion of drug use: no    Tetanus status:  Up to date  Associated symptoms: chest pain    Associated symptoms: no abdominal pain, no back pain, no blindness, no difficulty breathing, no headaches, no hearing loss, no loss of consciousness, no nausea, no neck pain, no seizures and no vomiting    Risk factors: anticoagulation therapy        Review of Systems   Constitutional: Negative for chills and fever.   HENT: Negative for hearing loss.    Eyes: Negative for blindness.   Respiratory: Negative for chest tightness, shortness of breath and wheezing.    Cardiovascular: Positive for chest pain. Negative for palpitations.   Gastrointestinal: Negative for abdominal pain, nausea and vomiting.   Musculoskeletal: Negative for back pain and neck pain.   Skin: Negative for pallor and rash.   Neurological: Negative for seizures, loss of consciousness and headaches.    Psychiatric/Behavioral: Negative.    All other systems reviewed and are negative.      Past Medical History:   Diagnosis Date   • Constipation     reports that she takes stool softners at night   • GERD (gastroesophageal reflux disease)    • H/O multiple myeloma    • Hypertension    • Incontinence of urine    • Lung cancer (HCC)    • Primary adenocarcinoma of middle lobe of right lung (HCC)    • Stroke (HCC)    • Tendonitis     right arm   • TIA (transient ischemic attack)     couple of mini strokes       No Known Allergies    Past Surgical History:   Procedure Laterality Date   • CYBERKNIFE  2019    Right lung mass   • CYBERKNIFE  2021    Left adrenal metastasis   • GOLD SEED FIDUCIAL PLACEMENT      adrenal gland   • LUNG BIOPSY     • MULTIPLE TOOTH EXTRACTIONS     • STOMACH SURGERY      perforated ulcer       Family History   Problem Relation Age of Onset   • Stroke Mother    • Cancer Father    • Melanoma Brother        Social History     Socioeconomic History   • Marital status:    Tobacco Use   • Smoking status: Former Smoker     Quit date:      Years since quittin.2   • Smokeless tobacco: Never Used   Substance and Sexual Activity   • Alcohol use: No   • Drug use: No   • Sexual activity: Defer           Objective   Physical Exam  Vitals and nursing note reviewed.   Constitutional:       Appearance: She is well-developed.      Comments: Thin frail nontoxic no acute distress   HENT:      Head: Normocephalic.      Comments: Small hematoma right side of the head.  There is no crepitus.  No dental trauma no malocclusion.  No hematotympanum     Right Ear: External ear normal.      Left Ear: External ear normal.      Nose: Nose normal.   Eyes:      General: No scleral icterus.     Conjunctiva/sclera: Conjunctivae normal.      Pupils: Pupils are equal, round, and reactive to light.   Neck:      Thyroid: No thyromegaly.   Cardiovascular:      Rate and Rhythm: Normal rate and regular  rhythm.      Heart sounds: Normal heart sounds.      Comments: Ecchymosis of the right lateral chest wall.  There is no crepitus no flail segment.  Pulmonary:      Effort: Pulmonary effort is normal. No respiratory distress.      Breath sounds: Normal breath sounds. No wheezing or rales.   Chest:      Chest wall: No tenderness.   Abdominal:      General: Bowel sounds are normal. There is no distension.      Palpations: Abdomen is soft.      Tenderness: There is no abdominal tenderness.   Musculoskeletal:         General: Normal range of motion.      Cervical back: Normal range of motion.   Lymphadenopathy:      Cervical: No cervical adenopathy.   Skin:     General: Skin is warm and dry.   Neurological:      Mental Status: She is alert and oriented to person, place, and time.      Cranial Nerves: No cranial nerve deficit.      Coordination: Coordination normal.      Deep Tendon Reflexes: Reflexes are normal and symmetric. Reflexes normal.   Psychiatric:         Behavior: Behavior normal.         Thought Content: Thought content normal.         Judgment: Judgment normal.         Procedures           ED Course  ED Course as of 04/02/22 1129   Fri Apr 01, 2022   1630 I reevaluated the patient after x-ray and CT.  I discussed the findings at the bedside with the patient and family.  On repeat evaluation she does have contusion at the right posterior chest wall along with tenderness along the ribs.  There is no gross deformity.  Radiograph does not show a clear rib fracture    I discussed the possibility of rib fracture despite lack of a positive x-ray with the patient and family.  We discussed incentive spirometer and supportive care.  She is treated with Tylenol  We discussed the possibility of late hemorrhage despite trivial head injury with chronic anticoagulation and the patient is on Eliquis.  We discussed follow-up with PCP as well as indications for immediate return    Very pleasant and responsible patient and  caring son verbalized understanding agreement plan of care, need for follow-up, and indications for immediate return.  I have answered their questions to their satisfaction at the bedside, and patient feels that she is is absolutely ready for discharge at the current time [HH]      ED Course User Index  [HH] Daniel Alvares MD                                         No results found for this or any previous visit (from the past 24 hour(s)).  Note: In addition to lab results from this visit, the labs listed above may include labs taken at another facility or during a different encounter within the last 24 hours. Please correlate lab times with ED admission and discharge times for further clarification of the services performed during this visit.    CT Head Without Contrast   Final Result   1.  No acute intracranial process or calvarial fracture identified.   2.  Findings suggestive of moderate chronic small vessel ischemic   disease.       This report was finalized on 4/1/2022 3:16 PM by Zelalem Galvan MD.          XR Ribs Right With PA Chest   Final Result   1.  No acute right rib fracture identified.   2.  Cardiomegaly with pulmonary vascular congestion and small right   pleural effusion.       This report was finalized on 4/1/2022 2:49 PM by Zelalem Galvan MD.            Vitals:    04/01/22 1500 04/01/22 1530 04/01/22 1600 04/01/22 1630   BP: 124/62 118/52 125/57 122/62   BP Location:       Patient Position:       Pulse: 75 80 79 79   Resp:       Temp:       TempSrc:       SpO2: 97% 98% 99% 96%   Weight:       Height:         Medications - No data to display  ECG/EMG Results (last 24 hours)     ** No results found for the last 24 hours. **        No orders to display                 MDM  Number of Diagnoses or Management Options  Chronic anticoagulation: new and requires workup  Closed head injury, initial encounter: new and requires workup  Contusion of right chest wall, initial encounter: new and requires  workup  Rib pain: new and requires workup     Amount and/or Complexity of Data Reviewed  Tests in the radiology section of CPT®: reviewed and ordered  Discuss the patient with other providers: yes    Patient Progress  Patient progress: stable      Final diagnoses:   Closed head injury, initial encounter   Chronic anticoagulation   Contusion of right chest wall, initial encounter   Rib pain       ED Disposition  ED Disposition     ED Disposition   Discharge    Condition   Stable    Comment   --             Ivan Lau MD  [address] [address]  [phone]      For Mandatory follow up and reevaluation in the office this coming week.  Call on Monday for an appointment         Medication List      No changes were made to your prescriptions during this visit.          Vitaly Sow PA  04/02/22 1137

## 2022-04-01 NOTE — DISCHARGE INSTRUCTIONS
Rest and push plenty of fluids    Use Tylenol as needed for pain    Continue current medications    Use incentive spirometer 10 times an hour while awake for the next several days and until your chest wall pain is significantly improved    You should expect to have continued muscular pain for several days.  Your x-ray did not show a broken rib but there still may be broken ribs there.  If you have continued and worsening pain follow-up with your doctor or the ED    You have any acute or significantly worsening symptoms including shortness of breath, fevers chills, neurologic symptoms or any other emergent symptoms, return to the ED at once as discussed

## 2022-04-18 PROBLEM — D63.8 ANEMIA, CHRONIC DISEASE: Status: ACTIVE | Noted: 2022-01-01

## 2022-04-18 PROBLEM — I10 ESSENTIAL HYPERTENSION: Status: ACTIVE | Noted: 2022-01-01

## 2022-04-18 PROBLEM — I50.810 RIGHT HEART FAILURE (HCC): Status: ACTIVE | Noted: 2022-01-01

## 2022-04-18 PROBLEM — J90 PLEURAL EFFUSION ON RIGHT: Status: ACTIVE | Noted: 2022-01-01

## 2022-04-18 PROBLEM — I26.99 PULMONARY EMBOLISM (HCC): Status: ACTIVE | Noted: 2022-01-01

## 2022-04-18 PROBLEM — J96.01 ACUTE RESPIRATORY FAILURE WITH HYPOXIA (HCC): Status: ACTIVE | Noted: 2022-01-01

## 2022-04-18 PROBLEM — E78.5 HYPERLIPEMIA: Status: ACTIVE | Noted: 2022-01-01

## 2022-04-21 PROBLEM — I48.91 ATRIAL FIBRILLATION WITH RVR: Status: ACTIVE | Noted: 2022-01-01

## 2022-05-01 NOTE — PROGRESS NOTES
[facility] Cardiology Daily Note       LOS: 13 days   Patient Care Team:  Ivan Lau MD as PCP - General (Family Medicine)  Andrzej Mirza MD as Referring Physician (Hematology and Oncology)  Franco Mujica MD as Consulting Physician (Orthopedic Surgery)  Juan A Briseno MD as Consulting Physician (Radiation Oncology)    Chief Complaint:  A. fib RVR/recurrent right pleural effusion/ heart failure with preserved EF    Subjective     Subjective: Patient sitting in bed family at bedside.  She states she is uncomfortable all over.  She has intermittent shortness of breath and an increased amount of phlegm in her throat.  She declines any chest pain.  States she has had a decreased appetite.  Discussed the pigtail drain again today as there was some confusion as to whether she did or did not want it and today she states that she absolutely does not want the pigtail drain.  Sinus rhythm sinus bradycardia.  Blood pressure stable.    Review of Systems:   As above.    Medications:  amiodarone, 200 mg, Oral, Q12H  apixaban, 5 mg, Oral, Q12H  aspirin, 81 mg, Oral, Daily  atorvastatin, 10 mg, Oral, Nightly  bisacodyl, 10 mg, Rectal, Once  citalopram, 20 mg, Oral, Daily  docusate sodium, 200 mg, Oral, BID  furosemide, 60 mg, Intravenous, BID  gabapentin, 300 mg, Oral, Q8H  ipratropium-albuterol, 3 mL, Nebulization, Q6H While Awake - RT  lidocaine, 1 patch, Transdermal, Q24H  melatonin, 5 mg, Oral, Nightly  metoprolol tartrate, 12.5 mg, Oral, Q12H  oxybutynin XL, 5 mg, Oral, Daily  pantoprazole, 40 mg, Oral, BID AC  polyethylene glycol, 17 g, Oral, Daily  saccharomyces boulardii, 250 mg, Oral, BID  sodium chloride, 10 mL, Intravenous, Q12H        Objective     Vital Sign Min/Max for last 24 hours  Temp  Min: 98.2 °F (36.8 °C)  Max: 98.4 °F (36.9 °C)    BP  Min: 125/63  Max: 134/62   Pulse  Min: 58  Max: 67   Resp  Min: 18  Max: 20   SpO2  Min: 92 %  Max: 100 %   Flow (L/min)  Min: 2  Max: 3  "  No data recorded      Intake/Output Summary (Last 24 hours) at 5/1/2022 0911  Last data filed at 4/30/2022 2228  Gross per 24 hour   Intake --   Output 2100 ml   Net -2100 ml        Flowsheet Rows    Flowsheet Row First Filed Value   Admission Height 165.1 cm (65\") Documented at 04/18/2022 1213   Admission Weight 81.6 kg (180 lb) Documented at 04/18/2022 1213          Physical Exam:    General: Alert and oriented.   Cardiovascular: Heart has a nondisplaced focal PMI. Regular rate and rhythm without murmur, gallop or rub.  Lungs: Equal bilateral symmetrical expansion with increased crackles on the left markedly reduced breath sounds on the right with changes consistent with atelectasis or an effusion  Abdomen: Soft, nontender.  Extremities: Show 1-2+ edema   Skin: warm and dry.     Results Review:    I reviewed the patient's new clinical results.    Tele: Sinus rhythm    Labs:    Results from last 7 days   Lab Units 04/29/22  0522 04/28/22  0324 04/27/22  0436   SODIUM mmol/L 132* 132* 138   POTASSIUM mmol/L 4.4 4.6 3.6   CHLORIDE mmol/L 92* 92* 93*   CO2 mmol/L 36.0* 37.0* 33.0*   BUN mg/dL 12 10 10   CREATININE mg/dL 0.58 0.67 0.78   CALCIUM mg/dL 9.4 9.2 8.9   GLUCOSE mg/dL 108* 125* 96     Results from last 7 days   Lab Units 04/29/22  0522 04/28/22  0324 04/27/22  0436   WBC 10*3/mm3 9.28 8.37 10.57   HEMOGLOBIN g/dL 8.6* 9.0* 8.7*   HEMATOCRIT % 26.7* 27.8* 26.3*   PLATELETS 10*3/mm3 251 266 261     Lab Results   Component Value Date    TROPONINT 0.029 04/21/2022    TROPONINT 0.017 04/20/2022    TROPONINT 0.014 04/18/2022        Lab Results   Component Value Date    INR 1.31 (H) 04/19/2022    INR 0.96 08/10/2021    INR 0.99 10/23/2019    PROTIME 16.3 (H) 04/19/2022    PROTIME 12.5 08/10/2021    PROTIME 11.3 10/23/2019         Assessment   Assessment:    PAF/RVR,   - resolved with amio initiation  - continue amio PO. Continue eliquis   - Remains in NSR      Recurrent right plural effusion  -Declined pigtail " drain and stated she just wants to be made comfortable.      Hypertension  Hyperlipidemia  -Continue Lisinopril, Metoprolol   -Continue statin     heart failure with preserved EF  -Continue diuresis today        Plan:  Continue cardiac medications for now.   Chest x ray requested per family to evaluate progression of plueral effusion  Will follow up tomorrow      Electronically signed by MEENA Díaz, 05/01/22, 9:11 AM EDT.

## 2022-05-01 NOTE — PROGRESS NOTES
[facility] Medicine Services  PROGRESS NOTE    Patient Name: Antonette Alvarez  : 1935  MRN: [id number]    Date of Admission: 2022  Primary Care Physician: Ivan Lau MD    Subjective   Subjective     CC:    F/U Right HF, PNA, Lung Ca     HPI:    Pt continues to be comfort measures   Pt reports pain in hip but does not want to be sleepy for visitors       ROS:  Gen- No fevers, chills  CV- No chest pain, palpitations  Resp- +cough, +dyspnea  GI- No N/V/D, abd pain    Objective   Objective     Vital Signs:   Temp:  [98.2 °F (36.8 °C)-98.4 °F (36.9 °C)] 98.4 °F (36.9 °C)  Heart Rate:  [58-67] 67  Resp:  [18-20] 18  BP: (125-134)/(62-63) 132/63  Flow (L/min):  [2-3] 3     Physical Exam:  Constitutional: No acute distress, awake, alert  HENT: NCAT, mucous membranes moist, +JVD  Respiratory: B/L Coarse breath sounds/crackles on 2L NC  Cardiovascular: RRR, no murmurs, rubs, or gallops  Gastrointestinal: Positive bowel sounds, soft, nontender, nondistended  Musculoskeletal: 3+ pitting LE edema  Psychiatric: Appropriate affect, cooperative  Neurologic: Oriented x 3,  speech clear, CANDELARIO  Skin: No rashes      Results Reviewed:  LAB RESULTS:      Lab 22  0324 22  0436   WBC 9.28 8.37 10.57   HEMOGLOBIN 8.6* 9.0* 8.7*   HEMATOCRIT 26.7* 27.8* 26.3*   PLATELETS 251 266 261   MCV 88.7 86.9 84.6   PROCALCITONIN 0.10  --  0.12         Lab 22  0324 22  0436   SODIUM 132* 132* 138   POTASSIUM 4.4 4.6 3.6   CHLORIDE 92* 92* 93*   CO2 36.0* 37.0* 33.0*   ANION GAP 4.0* 3.0* 12.0   BUN 12 10 10   CREATININE 0.58 0.67 0.78   EGFR 88.8 85.8 74.5   GLUCOSE 108* 125* 96   CALCIUM 9.4 9.2 8.9             Lab 22  0436   PROBNP 1,167.0                 Brief Urine Lab Results  (Last result in the past 365 days)      Color   Clarity   Blood   Leuk Est   Nitrite   Protein   CREAT   Urine HCG        22 0814 Yellow   Clear   Negative    Trace   Negative   Negative                 Microbiology Results Abnormal     Procedure Component Value - Date/Time    COVID PRE-OP / PRE-PROCEDURE SCREENING ORDER (NO ISOLATION) - Swab, Nasopharynx [[id number]]  (Normal) Collected: 04/27/22 0333    Lab Status: Final result Specimen: Swab from Nasopharynx Updated: 04/27/22 0333    Narrative:      The following orders were created for panel order COVID PRE-OP / PRE-PROCEDURE SCREENING ORDER (NO ISOLATION) - Swab, Nasopharynx.  Procedure                               Abnormality         Status                     ---------                               -----------         ------                     COVID-19, ABBOTT IN-HOUS...[[id number]]  Normal              Final result                 Please view results for these tests on the individual orders.    COVID-19, ABBOTT IN-HOUSE,NASAL Swab (NO TRANSPORT MEDIA) 2 HR TAT - Swab, Nasopharynx [[id number]]  (Normal) Collected: 04/27/22 0333    Lab Status: Final result Specimen: Swab from Nasopharynx Updated: 04/27/22 0333     COVID19 Presumptive Negative    Narrative:      Fact sheet for providers: https://www.fda.gov/media/409877/download     Fact sheet for patients: https://www.fda.gov/media/322431/download    Test performed by PCR.  If inconsistent with clinical signs and symptoms patient should be tested with different authorized molecular test.    Blood Culture - Blood, Hand, Right [[id number]]  (Normal) Collected: 04/20/22 2110    Lab Status: Final result Specimen: Blood from Hand, Right Updated: 04/25/22 2217     Blood Culture No growth at 5 days    Blood Culture - Blood, Arm, Left [[id number]]  (Normal) Collected: 04/20/22 2110    Lab Status: Final result Specimen: Blood from Arm, Left Updated: 04/25/22 2217     Blood Culture No growth at 5 days    Body Fluid Culture - Body Fluid, Pleural Cavity [[id number]] Collected: 04/19/22 1232    Lab Status: Final result Specimen: Body Fluid from Pleural Cavity Updated: 04/22/22 0932      Body Fluid Culture No growth at 3 days     Gram Stain Moderate (3+) WBCs seen      No organisms seen    MRSA Screen, PCR (Inpatient) - Swab, Nares [[id number]]  (Normal) Collected: 04/21/22 0815    Lab Status: Final result Specimen: Swab from Nares Updated: 04/21/22 0945     MRSA PCR Negative    Narrative:      MRSA Negative    KOH Prep - Swab, Pleural Cavity [[id number]] Collected: 04/19/22 1409    Lab Status: Final result Specimen: Swab from Pleural Cavity Updated: 04/19/22 1536     KOH Prep No yeast or hyphal elements seen    COVID PRE-OP / PRE-PROCEDURE SCREENING ORDER (NO ISOLATION) - Swab, Nasopharynx [[id number]]  (Normal) Collected: 04/18/22 1852    Lab Status: Final result Specimen: Swab from Nasopharynx Updated: 04/18/22 1934    Narrative:      The following orders were created for panel order COVID PRE-OP / PRE-PROCEDURE SCREENING ORDER (NO ISOLATION) - Swab, Nasopharynx.  Procedure                               Abnormality         Status                     ---------                               -----------         ------                     COVID-19 and FLU A/B PCR...[[id number]]  Normal              Final result                 Please view results for these tests on the individual orders.    COVID-19 and FLU A/B PCR - Swab, Nasopharynx [[id number]]  (Normal) Collected: 04/18/22 1852    Lab Status: Final result Specimen: Swab from Nasopharynx Updated: 04/18/22 1934     COVID19 Not Detected     Influenza A PCR Not Detected     Influenza B PCR Not Detected    Narrative:      Fact sheet for providers: https://www.fda.gov/media/253711/download    Fact sheet for patients: https://www.fda.gov/media/461153/download    Test performed by PCR.          No radiology results from the last 24 hrs    Results for orders placed during the hospital encounter of 04/18/22    Adult Transthoracic Echo Complete W/ Cont if Necessary Per Protocol    Interpretation Summary  · Left ventricular wall thickness is consistent with  mild concentric hypertrophy.  · The right ventricular cavity is mildly dilated.  · There is calcification of the aortic valve.  · Estimated right ventricular systolic pressure from tricuspid regurgitation is mildly elevated (35-45 mmHg). Calculated right ventricular systolic pressure from tricuspid regurgitation is 39 mmHg.  · Mild to moderate MR      I have reviewed the medications:  Scheduled Meds:amiodarone, 200 mg, Oral, Q12H  apixaban, 5 mg, Oral, Q12H  aspirin, 81 mg, Oral, Daily  atorvastatin, 10 mg, Oral, Nightly  bisacodyl, 10 mg, Rectal, Once  citalopram, 20 mg, Oral, Daily  docusate sodium, 200 mg, Oral, BID  furosemide, 60 mg, Intravenous, BID  gabapentin, 300 mg, Oral, Q8H  ipratropium-albuterol, 3 mL, Nebulization, Q6H While Awake - RT  lidocaine, 1 patch, Transdermal, Q24H  melatonin, 5 mg, Oral, Nightly  metoprolol tartrate, 12.5 mg, Oral, Q12H  oxybutynin XL, 5 mg, Oral, Daily  pantoprazole, 40 mg, Oral, BID AC  polyethylene glycol, 17 g, Oral, Daily  saccharomyces boulardii, 250 mg, Oral, BID  sodium chloride, 10 mL, Intravenous, Q12H      Continuous Infusions:   PRN Meds:.•  acetaminophen  •  bisacodyl  •  bisacodyl  •  lidocaine  •  LORazepam  •  magnesium sulfate **OR** magnesium sulfate **OR** magnesium sulfate  •  morphine  •  ondansetron **OR** ondansetron  •  potassium chloride **OR** potassium chloride **OR** potassium chloride  •  sodium chloride  •  sodium chloride    Assessment/Plan   Assessment & Plan     Active Hospital Problems    Diagnosis  POA   • Atrial fibrillation with RVR (HCC) [I48.91]  Unknown   • Right heart failure (HCC) [I50.810]  Yes   • Anemia, chronic disease [D63.8]  Yes   • Acute respiratory failure with hypoxia (HCC) [J96.01]  Yes   • Pleural effusion on right [J90]  Yes   • Essential hypertension [I10]  Yes   • Hyperlipemia [E78.5]  Yes   • Pulmonary embolism (HCC) [I26.99]  Yes   • Primary cancer of right middle lobe of lung (HCC) [C34.2]  Yes      Resolved Hospital  Problems   No resolved problems to display.        Brief Hospital Course to date:  Antonette Alvarez is a 86 y.o. female w history of multiple myeloma (s/p chemo now in remission), hypertension, hyperlipidemia, DVT/PE, known adenocarcinoma of the right lung, and hx of stroke (10 yrs ago).     Presented to [facility] ED for complaint of worsening lower extremity edema as well as worsening fatigue. Failed outpatient Lasix therapy.  -  Recently admitted to [facility] for bilat pulmonary emboli, as well as new onset Atrial Fib. Started on Eliquis.  - History of lung cancer, got Cyberknife therapy over 2 years ago. A year later, repeat Cyberknife for metastatic disease of the adrenal glands.  Most recent PET scan on 3/29/22 shows findings overall likely representing treatment response.    This patient's problems and plans were partially entered by my partner and updated as appropriate by me 05/01/22.     B/L Pleural Effusions on admission (large Right, small Left)   B/L LE Edema   Recurrent Non-Small Cell Lung Cancer   R heart failure  Bilateral PE's, now on Eliquis   RLL PNA   - on 2L  - IR (R) thoracentesis done 4/19:  TRANSUDATIVE. Cytology NEGATIVE.  1300ml clear yellow   -Oncology has seen.  Despite transudative effusion, the enlarging LAD is suspicious for CA relapse.  Patient is aware of this.     -Dr Mirza following,  -Cardiology following, Not much change from yesterday after IV Lasix BID - needs pigtail   -Continue Merrem for PNA (Day 3) -stopped now that comfort   -SLP following, mod pharyngeal dysphagia. S/P UGI + MBS, recommend nectar thick liquids -now comfort diet       Hypotension   - Resolved  - Remains on Midodrine 5mg TID     PAF/RVR,   - was complicated by hypotension  - resolved with amio initiation  - continue amio PO.  Cards following.  Continue eliquis   - Remains in NSR      Hypertension  Hyperlipidemia  -Continue Lisinopril, Metoprolol   -Continue statin     Anemia, chronic  -Appears  chronic in nature    Hypokalemia  -Resolved     Hyponatremia   -Continue Fluid restriction -stopped now that she is comfort          GOC/Family Update  I updated the patient's son via the phone.   .       DVT prophylaxis:  Medical and mechanical DVT prophylaxis orders are present.       AM-PAC 6 Clicks Score (PT): 12 (05/01/22 0815)    Disposition: I expect the patient to be discharged TBD.     CODE STATUS:   Code Status and Medical Interventions:   Ordered at: 04/29/22 1427     Code Status (Patient has no pulse and is not breathing):    No CPR (Do Not Attempt to Resuscitate)     Medical Interventions (Patient has pulse or is breathing):    Comfort Measures       Tiffani Hutchins, DO  05/01/22

## 2022-05-01 NOTE — PLAN OF CARE
Goal Outcome Evaluation:               Patient desatted an episode of dry heaving. O2 slow to recover, placed on non rebreather mask for a short period- patient recovered. Patient on 4 L nasal cannula. Prn norco, dilaudid and zofran given.

## 2022-05-02 NOTE — PLAN OF CARE
Goal Outcome Evaluation:               Uneventful shift. Comfort care orders in place. PRN norco given once. SonDoron at bedside earlier this am- updated about patients condition. Tele removed per order. Purewick removed due to incontinence.

## 2022-05-02 NOTE — CASE MANAGEMENT/SOCIAL WORK
Continued Stay Note  [facility]     Patient Name: Antonette Alvarez  MRN: [id number]  Today's Date: 5/2/2022    Admit Date: 4/18/2022     Discharge Plan     Row Name 05/02/22 1142       Plan    Plan Comments CM is following for discharge needs. Palliative is followind and once GOC are established CM will discuss possible need for rehab.               Discharge Codes    No documentation.               Expected Discharge Date and Time     Expected Discharge Date Expected Discharge Time    Apr 28, 2022             Henna Paul RN

## 2022-05-02 NOTE — PROGRESS NOTES
[facility] at [facility]  IP Progress Note      Chief Complaint/Reason for service: #1 A. fib RVR resolved #2 heart failure with preserved EF #3 recurrent right pleural effusion    Subjective   Subjective: The patient states her breathing has not changed at all since Friday.  That is when I increased her diuretic dose to 60 mg IV every 12 hours.  She denies shortness of breath.  She feels weak.  No nausea or vomiting    Past medical, surgical, social and family history reviewed in the patient's electronic medical record.    Objective     Vital Sign Min/Max for last 24 hours  Temp  Min: 98.3 °F (36.8 °C)  Max: 98.9 °F (37.2 °C)   BP  Min: 98/43  Max: 152/70   Pulse  Min: 59  Max: 85   Resp  Min: 18  Max: 18   SpO2  Min: 90 %  Max: 96 %   Flow (L/min)  Min: 3  Max: 15      Intake/Output Summary (Last 24 hours) at 5/2/2022 0709  Last data filed at 5/1/2022 1835  Gross per 24 hour   Intake 300 ml   Output 975 ml   Net -675 ml             Current Facility-Administered Medications:   •  acetaminophen (TYLENOL) tablet 650 mg, 650 mg, Oral, Q4H PRN, Lakeisha Lemon PA-C, 650 mg at 05/01/22 0826  •  amiodarone (PACERONE) tablet 200 mg, 200 mg, Oral, Q12H, Tyrell Ortega MD, 200 mg at 05/01/22 2059  •  apixaban (ELIQUIS) tablet 5 mg, 5 mg, Oral, Q12H, Kimberlee Hatch MD, 5 mg at 05/01/22 2100  •  aspirin EC tablet 81 mg, 81 mg, Oral, Daily, Lakeisha Lemon PA-C, 81 mg at 05/01/22 0826  •  atorvastatin (LIPITOR) tablet 10 mg, 10 mg, Oral, Nightly, Lakeisha Lemon PA-C, 10 mg at 05/01/22 2059  •  Biotene dry mouth liquid 15 mL, 15 mL, Swish & Spit, 5x Daily PRN, Tiffani Hutchins DO, 15 mL at 05/01/22 1649  •  bisacodyl (DULCOLAX) EC tablet 10 mg, 10 mg, Oral, Daily PRN, Bibi Rubin DO, 10 mg at 04/29/22 2044  •  bisacodyl (DULCOLAX) suppository 10 mg, 10 mg, Rectal, Daily PRN, Bibi Rubin,   •  bisacodyl (DULCOLAX) suppository 10 mg, 10 mg,  Rectal, Once, Flora Thompson APRN  •  citalopram (CeleXA) tablet 20 mg, 20 mg, Oral, Daily, Bibi Rubin DO, 20 mg at 05/01/22 0827  •  docusate sodium (COLACE) capsule 200 mg, 200 mg, Oral, BID, Josephine Brice APRN, 200 mg at 05/01/22 2059  •  furosemide (LASIX) injection 60 mg, 60 mg, Intravenous, BID, Tyrell Ortega MD, 60 mg at 05/01/22 1755  •  gabapentin (NEURONTIN) capsule 300 mg, 300 mg, Oral, Q8H, Mratha Flores MD, 300 mg at 05/01/22 2101  •  HYDROcodone-acetaminophen (NORCO) 5-325 MG per tablet 1 tablet, 1 tablet, Oral, Q6H PRN, Tiffani Hutchins DO, 1 tablet at 05/01/22 1259  •  HYDROmorphone (DILAUDID) injection 0.5 mg, 0.5 mg, Intravenous, Q2H PRN, Tiffani Hutchins DO, 0.5 mg at 05/01/22 1937  •  ipratropium-albuterol (DUO-NEB) nebulizer solution 3 mL, 3 mL, Nebulization, Q6H While Awake - RT, Bibi Rubin DO, 3 mL at 05/02/22 0636  •  lidocaine (LIDODERM) 5 % 1 patch, 1 patch, Transdermal, Q24H, Josephine Brice APRN, 1 patch at 05/01/22 1027  •  lidocaine (LIDODERM) 5 % 1 patch, 1 patch, Transdermal, Daily PRN, Josephine Brice APRN  •  LORazepam (ATIVAN) injection 1 mg, 1 mg, Intravenous, Q2H PRN, Tiffani Hutchins DO  •  Magnesium Sulfate 2 gram Bolus, followed by 8 gram infusion (total Mg dose 10 grams)- Mg less than or equal to 1mg/dL, 2 g, Intravenous, PRN **OR** Magnesium Sulfate 2 gram / 50mL Infusion (GIVE X 3 BAGS TO EQUAL 6GM TOTAL DOSE) - Mg 1.1 - 1.5 mg/dl, 2 g, Intravenous, PRN, Last Rate: 25 mL/hr at 04/21/22 0227, 2 g at 04/21/22 0227 **OR** Magnesium Sulfate 4 gram infusion- Mg 1.6-1.9 mg/dL, 4 g, Intravenous, PRN, Kari Ron, DO  •  melatonin tablet 5 mg, 5 mg, Oral, Nightly, Lakeisha Lemon PA-C, 5 mg at 05/01/22 2100  •  metoprolol tartrate (LOPRESSOR) half tablet 12.5 mg, 12.5 mg, Oral, Q12H, Lakeisha Lemon PA-C, 12.5 mg at 05/01/22 2059  •  ondansetron (ZOFRAN) tablet 4 mg, 4 mg,  Oral, Q6H PRN **OR** ondansetron (ZOFRAN) injection 4 mg, 4 mg, Intravenous, Q6H PRN, Lakeisha Lemon PA-C, 4 mg at 05/01/22 1802  •  oxybutynin XL (DITROPAN-XL) 24 hr tablet 5 mg, 5 mg, Oral, Daily, Lakeisha Lemon PA-C, 5 mg at 05/01/22 0826  •  pantoprazole (PROTONIX) EC tablet 40 mg, 40 mg, Oral, BID AC, Lakeisha Lemon PA-C, 40 mg at 05/01/22 1755  •  polyethylene glycol (MIRALAX) packet 17 g, 17 g, Oral, Daily, Bibi Rubin DO, 17 g at 04/29/22 0907  •  potassium chloride (MICRO-K) CR capsule 40 mEq, 40 mEq, Oral, PRN, 40 mEq at 04/27/22 2356 **OR** potassium chloride (KLOR-CON) packet 40 mEq, 40 mEq, Oral, PRN **OR** potassium chloride 10 mEq in 100 mL IVPB, 10 mEq, Intravenous, Q1H PRN, Bibi Rubin DO  •  saccharomyces boulardii (FLORASTOR) capsule 250 mg, 250 mg, Oral, BID, Bibi Rubin DO, 250 mg at 05/01/22 2059  •  sodium chloride 0.9 % flush 10 mL, 10 mL, Intravenous, PRN, Nikolay Balbuena MD  •  sodium chloride 0.9 % flush 10 mL, 10 mL, Intravenous, Q12H, Lakeisha Lemon PA-C, 10 mL at 05/01/22 2100  •  sodium chloride 0.9 % flush 10 mL, 10 mL, Intravenous, PRN, Lakeisha Lemon PA-C    Physical Exam: General elderly female with resting tachypnea and dyspnea.  She appears weak and frail        HEENT: No icterus.  Nasal O2 is present.       Respiratory: Equal bilateral symmetrical expansion with markedly reduced breath sounds on the right with E to a changes.  Crackles on the left base also with mild decrease changes at the base       Cardiovascular: Regular rate and rhythm without murmur gallop or click with persistent pitting edema of the lower extremities unchanged from Friday       GI: Soft nontender       Lower Extremities: No obvious lesions       Neuro: Patient is very weak.  Her facial expressions do appear symmetrical       Skin: Warm and dry with pitting edema to palpation       Psych: Flat affect lethargy    Results  Review: Output exceeds intake since admission by 9.2 L.  Heart rates in the 60s.  Blood pressures 98-1 52.  Hemoglobin yesterday was 8.6.  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        COMPARISON: Chest radiograph dated 04/28/2022     TECHNIQUE: Portable AP view of the chest was obtained.     FINDINGS:  The cardiac silhouette appears stable from the prior examination. There  is aortic arch atherosclerotic calcification. There is increase in the  right-sided pleural effusion, now small-to-moderate in size. There is  persistent right basilar airspace opacity, likely atelectasis. There is  a stable small left-sided pleural effusion with associated left basilar  airspace opacity. There is no evidence of pneumothorax. There are  degenerative changes of thoracic spine.       Impression:      1. Mild increase in the right-sided pleural effusion, now small to  moderate in size with associated airspace disease. This likely relates  to compressive atelectasis or pneumonia.  2. Stable small left-sided pleural effusion with associated left basilar  airspace disease.     This report was finalized on 5/1/2022 10:32 AM by Lincoln Hartley MD.       FL Video Swallow With Speech Single Contrast [[id number]] Collected: 04/27/22 1430     Updated: 04/29/22 0812    Narrative:      EXAMINATION: FL  VIDEO SWALLOW W SPEECH SINGLE-CONTRAST-     INDICATION: r/o aspiration; R60.9-Edema, unspecified; Z86.711-Personal  history of pulmonary embolism; Z79.01-Long term (current) use of  anticoagulants; Z86.79-Personal history of other diseases of the  circulatory system; Z85.118-Personal history of other malignant neoplasm  of bronchus and lung; C79.72-Secondary malignant neoplasm of left  adrenal gland     TECHNIQUE: 2 minutes of fluoroscopic time was used for this exam. 10  associated fluoroscopic loops were saved. The patient was evaluated in  the seated lateral position while taking a variety of consistencies of  barium by mouth under the direction of speech pathology.     COMPARISON: NONE     FINDINGS: 2 minutes of fluoroscopy provided for a modified barium  swallow. Please see speech therapy report for full details and  recommendations. Limited upper GI series demonstrated mild  gastroesophageal reflux to the level of the midesophagus. There was mild  esophageal dysmotility. There was no evidence of a focal esophageal  stricture.          Impression:      Fluoroscopy provided for a modified barium swallow. Please  see speech therapy report for full details and recommendations. Limited  upper GI series demonstrated mild gastroesophageal reflux, and mild  esophageal dysmotility.         This report was finalized on 4/27/2022 5:27 PM by Nikolay Loza MD.       FL Limited Ugi For Mbs Reflux Single-Contrast [[id number]] Collected: 04/27/22 1430     Updated: 04/29/22 0812    Narrative:      EXAMINATION: FL VIDEO SWALLOW W SPEECH SINGLE-CONTRAST-     INDICATION: r/o aspiration; R60.9-Edema, unspecified; Z86.711-Personal  history of pulmonary embolism; Z79.01-Long term (current) use of  anticoagulants; Z86.79-Personal history of other diseases of the  circulatory system; Z85.118-Personal history of other malignant neoplasm  of bronchus and lung; C79.72-Secondary malignant neoplasm of left  adrenal gland     TECHNIQUE: 2  minutes of fluoroscopic time was used for this exam. 10  associated fluoroscopic loops were saved. The patient was evaluated in  the seated lateral position while taking a variety of consistencies of  barium by mouth under the direction of speech pathology.     COMPARISON: NONE     FINDINGS: 2 minutes of fluoroscopy provided for a modified barium  swallow. Please see speech therapy report for full details and  recommendations. Limited upper GI series demonstrated mild  gastroesophageal reflux to the level of the midesophagus. There was mild  esophageal dysmotility. There was no evidence of a focal esophageal  stricture.          Impression:      Fluoroscopy provided for a modified barium swallow. Please  see speech therapy report for full details and recommendations. Limited  upper GI series demonstrated mild gastroesophageal reflux, and mild  esophageal dysmotility.         This report was finalized on 4/27/2022 5:27 PM by Nikolay Loza MD.             EKG: Sinus rhythm    ECHO: Normal EF    Tele: Sinus rhythm    Assessment   Assessment/Plan: 1 patient had atrial fibrillation early in her admission.  She converted with amiodarone.  She is maintaining sinus rhythm and is anticoagulated with Eliquis  2 recurrent pleural effusion-chest x-ray reports the effusion is slightly worse and  there is compressive atelectasis or pneumonia of the right lung.  I have diuresed the patient aggressively and despite that the effusion is increased slightly.  The patient remains tachypneic and dyspneic at rest.  At this point I see no reason to continue the IV diuretics as the patient has had no improvement despite diuresis of nearly 10  L of fluid.  I recommend the patient be under palliative care or hospice care.  I will make the patient as needed but please free to call us for any issues    Tyrell Ortega MD  05/02/22  07:09 EDT

## 2022-05-02 NOTE — SIGNIFICANT NOTE
05/02/22 0815   SLP Deferred Reason   SLP Deferred Reason Routine  (Pt now comfort measures & on comfort diet. No further SLP intervention indicated. Will sign off. Please re-consult if any further needs. Thank you.)

## 2022-05-02 NOTE — PROGRESS NOTES
Continued Stay Note   [facility]     Patient Name: Antonette Alvarez  MRN: [id number]  Today's Date: 5/2/2022    Admit Date: 4/18/2022     Discharge Plan     Row Name 05/02/22 1601       Plan    Plan TBD    Patient/Family in Agreement with Plan yes    Plan Comments BS visit made. Pt today is more lethargic when compared to previous visits. Pt has now been taking Norco. Call placed to the pt.'s son, Doron, who states that he is currently in a meeting. Writer will await a return phone call. If further assistance is needed, please call [phone].               Discharge Codes    No documentation.               Expected Discharge Date and Time     Expected Discharge Date Expected Discharge Time    Apr 28, 2022             Denita Ag

## 2022-05-02 NOTE — PLAN OF CARE
Problem: Fall Injury Risk  Goal: Absence of Fall and Fall-Related Injury  Outcome: Ongoing, Progressing  Intervention: Identify and Manage Contributors  Recent Flowsheet Documentation  Taken 5/1/2022 2045 by Kaern Cordova RN  Medication Review/Management: medications reviewed  Intervention: Promote Injury-Free Environment  Recent Flowsheet Documentation  Taken 5/2/2022 0400 by Karen Cordova, RN  Safety Promotion/Fall Prevention:   safety round/check completed   fall prevention program maintained   assistive device/personal items within reach  Taken 5/2/2022 0200 by Karen Cordova RN  Safety Promotion/Fall Prevention:   safety round/check completed   fall prevention program maintained   assistive device/personal items within reach  Taken 5/2/2022 0000 by Karen Cordova RN  Safety Promotion/Fall Prevention:   safety round/check completed   fall prevention program maintained   assistive device/personal items within reach   activity supervised  Taken 5/1/2022 2200 by Karen Cordova, RN  Safety Promotion/Fall Prevention:   safety round/check completed   fall prevention program maintained   assistive device/personal items within reach  Taken 5/1/2022 2045 by Karen Cordova RN  Safety Promotion/Fall Prevention:   safety round/check completed   fall prevention program maintained   assistive device/personal items within reach   activity supervised     Problem: Adult Inpatient Plan of Care  Goal: Optimal Comfort and Wellbeing  Outcome: Ongoing, Progressing  Goal: Readiness for Transition of Care  Outcome: Ongoing, Progressing   Goal Outcome Evaluation:

## 2022-05-02 NOTE — PROGRESS NOTES
[facility] Medicine Services  PROGRESS NOTE    Patient Name: Antonette Alvarez  : 1935  MRN: [id number]    Date of Admission: 2022  Primary Care Physician: Ivan Lau MD    Subjective   Subjective     CC:    F/U Right HF, PNA, Lung Ca     HPI:    Pt continues to be comfort measures  No acute events overnight         ROS:  Gen- No fevers, chills  CV- No chest pain, palpitations  Resp- +cough, +dyspnea  GI- No N/V/D, abd pain    Objective   Objective     Vital Signs:   Temp:  [98.1 °F (36.7 °C)-98.9 °F (37.2 °C)] 98.1 °F (36.7 °C)  Heart Rate:  [60-85] 67  Resp:  [18] 18  BP: ()/(43-70) 100/44  Flow (L/min):  [3-15] 4     Physical Exam:  Constitutional: No acute distress, awake, alert  HENT: NCAT, mucous membranes moist, +JVD  Respiratory: B/L Coarse breath sounds/crackles on 2L NC  Cardiovascular: RRR, no murmurs, rubs, or gallops  Gastrointestinal: Positive bowel sounds, soft, nontender, nondistended  Musculoskeletal: 3+ pitting LE edema  Psychiatric: Appropriate affect, cooperative  Neurologic: Oriented x 3,  speech clear, CANDELARIO  Skin: No rashes      Results Reviewed:  LAB RESULTS:      Lab 22  0436   WBC 9.28 8.37 10.57   HEMOGLOBIN 8.6* 9.0* 8.7*   HEMATOCRIT 26.7* 27.8* 26.3*   PLATELETS 251 266 261   MCV 88.7 86.9 84.6   PROCALCITONIN 0.10  --  0.12         Lab 22  0348 22  0522 22  0324 22  0436   SODIUM  --  132* 132* 138   POTASSIUM  --  4.4 4.6 3.6   CHLORIDE  --  92* 92* 93*   CO2  --  36.0* 37.0* 33.0*   ANION GAP  --  4.0* 3.0* 12.0   BUN  --  12 10 10   CREATININE  --  0.58 0.67 0.78   EGFR  --  88.8 85.8 74.5   GLUCOSE  --  108* 125* 96   CALCIUM  --  9.4 9.2 8.9   MAGNESIUM 1.8  --   --   --              Lab 22  0436   PROBNP 1,167.0                 Brief Urine Lab Results  (Last result in the past 365 days)      Color   Clarity   Blood   Leuk Est   Nitrite   Protein   CREAT   Urine  HCG        04/21/22 0814 Yellow   Clear   Negative   Trace   Negative   Negative                 Microbiology Results Abnormal     Procedure Component Value - Date/Time    COVID PRE-OP / PRE-PROCEDURE SCREENING ORDER (NO ISOLATION) - Swab, Nasopharynx [[id number]]  (Normal) Collected: 04/27/22 0333    Lab Status: Final result Specimen: Swab from Nasopharynx Updated: 04/27/22 0333    Narrative:      The following orders were created for panel order COVID PRE-OP / PRE-PROCEDURE SCREENING ORDER (NO ISOLATION) - Swab, Nasopharynx.  Procedure                               Abnormality         Status                     ---------                               -----------         ------                     COVID-19, ABBOTT IN-HOUS...[[id number]]  Normal              Final result                 Please view results for these tests on the individual orders.    COVID-19, ABBOTT IN-HOUSE,NASAL Swab (NO TRANSPORT MEDIA) 2 HR TAT - Swab, Nasopharynx [[id number]]  (Normal) Collected: 04/27/22 0333    Lab Status: Final result Specimen: Swab from Nasopharynx Updated: 04/27/22 0333     COVID19 Presumptive Negative    Narrative:      Fact sheet for providers: https://www.fda.gov/media/699229/download     Fact sheet for patients: https://www.fda.gov/media/505899/download    Test performed by PCR.  If inconsistent with clinical signs and symptoms patient should be tested with different authorized molecular test.    Blood Culture - Blood, Hand, Right [[id number]]  (Normal) Collected: 04/20/22 2110    Lab Status: Final result Specimen: Blood from Hand, Right Updated: 04/25/22 2217     Blood Culture No growth at 5 days    Blood Culture - Blood, Arm, Left [[id number]]  (Normal) Collected: 04/20/22 2110    Lab Status: Final result Specimen: Blood from Arm, Left Updated: 04/25/22 2217     Blood Culture No growth at 5 days    Body Fluid Culture - Body Fluid, Pleural Cavity [[id number]] Collected: 04/19/22 1232    Lab Status: Final result Specimen:  Body Fluid from Pleural Cavity Updated: 04/22/22 0932     Body Fluid Culture No growth at 3 days     Gram Stain Moderate (3+) WBCs seen      No organisms seen    MRSA Screen, PCR (Inpatient) - Swab, Nares [[id number]]  (Normal) Collected: 04/21/22 0815    Lab Status: Final result Specimen: Swab from Nares Updated: 04/21/22 0945     MRSA PCR Negative    Narrative:      MRSA Negative    KOH Prep - Swab, Pleural Cavity [[id number]] Collected: 04/19/22 1409    Lab Status: Final result Specimen: Swab from Pleural Cavity Updated: 04/19/22 1536     KOH Prep No yeast or hyphal elements seen    COVID PRE-OP / PRE-PROCEDURE SCREENING ORDER (NO ISOLATION) - Swab, Nasopharynx [[id number]]  (Normal) Collected: 04/18/22 1852    Lab Status: Final result Specimen: Swab from Nasopharynx Updated: 04/18/22 1934    Narrative:      The following orders were created for panel order COVID PRE-OP / PRE-PROCEDURE SCREENING ORDER (NO ISOLATION) - Swab, Nasopharynx.  Procedure                               Abnormality         Status                     ---------                               -----------         ------                     COVID-19 and FLU A/B PCR...[[id number]]  Normal              Final result                 Please view results for these tests on the individual orders.    COVID-19 and FLU A/B PCR - Swab, Nasopharynx [[id number]]  (Normal) Collected: 04/18/22 1852    Lab Status: Final result Specimen: Swab from Nasopharynx Updated: 04/18/22 1934     COVID19 Not Detected     Influenza A PCR Not Detected     Influenza B PCR Not Detected    Narrative:      Fact sheet for providers: https://www.fda.gov/media/262084/download    Fact sheet for patients: https://www.fda.gov/media/291289/download    Test performed by PCR.          XR Chest 1 View    Result Date: 5/1/2022  EXAMINATION: XR CHEST 1 VW-  DATE OF EXAM: 5/1/2022 9:41 AM  INDICATION: Progressive shortness of breath; R60.9-Edema, unspecified; Z86.711-Personal history of pulmonary  embolism; Z79.01-Long term (current) use of anticoagulants; Z86.79-Personal history of other diseases of the circulatory system; Z85.118-Personal history of other malignant neoplasm of bronchus and lung; C79.72-Secondary malignant neoplasm of left adrenal gland; R13.13-Dysphagia, pharyngeal phase.    COMPARISON: Chest radiograph dated 04/28/2022  TECHNIQUE: Portable AP view of the chest was obtained.  FINDINGS: The cardiac silhouette appears stable from the prior examination. There is aortic arch atherosclerotic calcification. There is increase in the right-sided pleural effusion, now small-to-moderate in size. There is persistent right basilar airspace opacity, likely atelectasis. There is a stable small left-sided pleural effusion with associated left basilar airspace opacity. There is no evidence of pneumothorax. There are degenerative changes of thoracic spine.      Impression: 1. Mild increase in the right-sided pleural effusion, now small to moderate in size with associated airspace disease. This likely relates to compressive atelectasis or pneumonia. 2. Stable small left-sided pleural effusion with associated left basilar airspace disease.  This report was finalized on 5/1/2022 10:32 AM by Lincoln Hartley MD.        Results for orders placed during the hospital encounter of 04/18/22    Adult Transthoracic Echo Complete W/ Cont if Necessary Per Protocol    Interpretation Summary  · Left ventricular wall thickness is consistent with mild concentric hypertrophy.  · The right ventricular cavity is mildly dilated.  · There is calcification of the aortic valve.  · Estimated right ventricular systolic pressure from tricuspid regurgitation is mildly elevated (35-45 mmHg). Calculated right ventricular systolic pressure from tricuspid regurgitation is 39 mmHg.  · Mild to moderate MR      I have reviewed the medications:  Scheduled Meds:amiodarone, 200 mg, Oral, Q12H  apixaban, 5 mg, Oral, Q12H  aspirin, 81 mg, Oral,  Daily  atorvastatin, 10 mg, Oral, Nightly  bisacodyl, 10 mg, Rectal, Once  citalopram, 20 mg, Oral, Daily  docusate sodium, 200 mg, Oral, BID  gabapentin, 300 mg, Oral, Q8H  ipratropium-albuterol, 3 mL, Nebulization, Q6H While Awake - RT  lidocaine, 1 patch, Transdermal, Q24H  melatonin, 5 mg, Oral, Nightly  metoprolol tartrate, 12.5 mg, Oral, Q12H  oxybutynin XL, 5 mg, Oral, Daily  pantoprazole, 40 mg, Oral, BID AC  polyethylene glycol, 17 g, Oral, Daily  saccharomyces boulardii, 250 mg, Oral, BID  sodium chloride, 10 mL, Intravenous, Q12H      Continuous Infusions:   PRN Meds:.•  acetaminophen  •  Biotene dry mouth  •  bisacodyl  •  bisacodyl  •  HYDROcodone-acetaminophen  •  HYDROmorphone  •  lidocaine  •  LORazepam  •  magnesium sulfate **OR** magnesium sulfate **OR** magnesium sulfate  •  ondansetron **OR** ondansetron  •  potassium chloride **OR** potassium chloride **OR** potassium chloride  •  sodium chloride  •  sodium chloride    Assessment/Plan   Assessment & Plan     Active Hospital Problems    Diagnosis  POA   • Atrial fibrillation with RVR (HCC) [I48.91]  Unknown   • Right heart failure (HCC) [I50.810]  Yes   • Anemia, chronic disease [D63.8]  Yes   • Acute respiratory failure with hypoxia (HCC) [J96.01]  Yes   • Pleural effusion on right [J90]  Yes   • Essential hypertension [I10]  Yes   • Hyperlipemia [E78.5]  Yes   • Pulmonary embolism (HCC) [I26.99]  Yes   • Primary cancer of right middle lobe of lung (HCC) [C34.2]  Yes      Resolved Hospital Problems   No resolved problems to display.        Brief Hospital Course to date:  Antonette Alvarez is a 86 y.o. female w history of multiple myeloma (s/p chemo now in remission), hypertension, hyperlipidemia, DVT/PE, known adenocarcinoma of the right lung, and hx of stroke (10 yrs ago).     Presented to [facility] ED for complaint of worsening lower extremity edema as well as worsening fatigue. Failed outpatient Lasix therapy.  -  Recently admitted to  [facility] for bilat pulmonary emboli, as well as new onset Atrial Fib. Started on Eliquis.  - History of lung cancer, got Cyberknife therapy over 2 years ago. A year later, repeat Cyberknife for metastatic disease of the adrenal glands.  Most recent PET scan on 3/29/22 shows findings overall likely representing treatment response.    This patient's problems and plans were partially entered by my partner and updated as appropriate by me 05/02/22.     B/L Pleural Effusions on admission (large Right, small Left)   B/L LE Edema   Recurrent Non-Small Cell Lung Cancer   R heart failure  Bilateral PE's, now on Eliquis   RLL PNA   - on 2L  - IR (R) thoracentesis done 4/19:  TRANSUDATIVE. Cytology NEGATIVE.  1300ml clear yellow   -Oncology has seen.  Despite transudative effusion, the enlarging LAD is suspicious for CA relapse.  Patient is aware of this.     -Dr Mirza following,  -Cardiology following, Not much change from yesterday after IV Lasix BID - needs pigtail   -Continue Merrem for PNA (Day 3) -stopped now that comfort   -SLP following, mod pharyngeal dysphagia. S/P UGI + MBS, recommend nectar thick liquids -now comfort diet       Hypotension   - Resolved  - Remains on Midodrine 5mg TID     PAF/RVR,   - was complicated by hypotension  - resolved with amio initiation  - continue amio PO.  Cards following.  Continue eliquis   - Remains in NSR      Hypertension  Hyperlipidemia  -Continue Lisinopril, Metoprolol   -Continue statin     Anemia, chronic  -Appears chronic in nature    Hypokalemia  -Resolved     Hyponatremia   -Continue Fluid restriction -stopped now that she is comfort          GOC/Family Update  I updated the patient's son via the phone.   .       DVT prophylaxis:  Medical and mechanical DVT prophylaxis orders are present.       AM-PAC 6 Clicks Score (PT): 12 (05/01/22 2045)    Disposition: I expect the patient to be discharged TBD.     CODE STATUS:   Code Status and Medical Interventions:   Ordered  at: 04/29/22 1427     Code Status (Patient has no pulse and is not breathing):    No CPR (Do Not Attempt to Resuscitate)     Medical Interventions (Patient has pulse or is breathing):    Comfort Measures       Tiffani Hutchins, DO  05/02/22

## 2022-05-02 NOTE — PLAN OF CARE
"Goal Outcome Evaluation:  Plan of Care Reviewed With: patient, family           Outcome Evaluation: Pt. lying in bed on 4LNC, tachypneic, wheezy and demonstrated accessory muscle use especially with conversation.  Pt. would answer questions then fall back to sleep.  Per NATALIE Garcia, pt. has not been resting well at night.  Pt. declined pigtail drain and wants to be comfortable.  Pt. hesitant to take opioid medications as she does not like the way they make her feel.  Pt. denies overall \"pain\" but stated her left shoulder is a bit uncomfortable but adjusting her position helps some.  Pts son Doron at bedside as well.  Pt. very weak and tired.  Palliative care to follow for support, POC and symptom management.    1330 Palliative IDT meeting: DONALDO Miguel RN, CHPN; CHASE Sierra, RN, CHPN; MEENA Mclean; LARA Ag, BRIJESHW; BOLIVAR Talley RN, CHPN; LARA Bernstein, RN;  STU Castillo MD  "

## 2022-05-03 PROBLEM — C34.90 RECURRENT NON-SMALL CELL LUNG CANCER (NSCLC) (HCC): Status: ACTIVE | Noted: 2022-01-01

## 2022-05-03 NOTE — PROGRESS NOTES
Continued Stay Note  KY[facility]     Patient Name: Antonette Alvarez  MRN: [id number]  Today's Date: 5/3/2022    Admit Date: 4/18/2022     Discharge Plan     Row Name 05/03/22 0841       Plan    Plan In pt Hospice Assessment    Patient/Family in Agreement with Plan yes    Plan Comments BS visit made. Pt. is currently unresponsive & is agonally breathing. PPS is 10%. Family was @ BS. Writer explain that pt.'s decline meets in pt hospice Medicare criteria, family verbalized understanding. In pt hospice has been notified.               Discharge Codes    No documentation.               Expected Discharge Date and Time     Expected Discharge Date Expected Discharge Time    Apr 28, 2022             Denita Ag

## 2022-05-03 NOTE — PROGRESS NOTES
Continued Stay Note  [facility]     Patient Name: Antonette Alvarez  MRN: [id number]  Today's Date: 5/3/2022    Admit Date: 5/3/2022     Discharge Plan     Row Name 05/03/22 1123       Plan    Plan Inpatient hospice    Plan Comments Admitted to inpatient hospice. Noted with relaxed face, jaw, body posture, and respirations. Unresponsive. Abdomen is tight, distended, firm. Toes cool, faint mottling. Family at bedside are supportive. Discussed assessment, nonverbal indicators of comfort, medications, safety, eol signs/symptoms, care at end of life, what to possibly expect from this point forward. Family verbalized understanding. Rn offered ongoing support, open communication. This patient meets inpatient criteria due to requirements of injectable medications requiring frequent skilled nursing assessment, intervention, and reevaluation.  Current level of care unable to be provided in alternate setting.  Disposition dependent on trajectory of illness.    Final Discharge Disposition Code 51 - hospice medical facility               Discharge Codes    No documentation.                     Tamika Gonzalez RN

## 2022-05-03 NOTE — PROGRESS NOTES
Continued Stay Note  [facility]     Patient Name: Antonette Alvarez  MRN: [id number]  Today's Date: 5/3/2022    Admit Date: 4/18/2022     Discharge Plan     Row Name 05/03/22 0854       Plan    Plan Meeting at 10:30am    Plan Comments Inpatient hospice will meet with family today at 10:30am.     If inpatient hospice team can be of assistance, please call [phone].    Row Name 05/03/22 0841                                     Discharge Codes    No documentation.               Expected Discharge Date and Time     Expected Discharge Date Expected Discharge Time    Apr 28, 2022             JENNIFER Landa

## 2022-05-04 NOTE — INTERVAL H&P NOTE
Admitted to inpatient Hospice today for full comfort focused plan of care.  Terminal dx of recurrent NSC lung cancer with related heart failure, pleural effusions, AF, PNA, and dysphagia.    Prognosis hours to days.  Full H&P update to follow with MD F2F visit.    Carli Balbuena MD  5/3/22

## 2022-05-04 NOTE — PLAN OF CARE
Goal Outcome Evaluation:  Plan of Care Reviewed With: patient        Progress: declining  Outcome Evaluation: Pt resting well. Family at bedside. No PRN's needed for comfort. Will continue to monitor.

## 2022-05-04 NOTE — SIGNIFICANT NOTE
Exam confirms with auscultation zero audible heart tones and zero audible respirations. Ms.Violet GILDARDO Alvarez was pronounced dead at 0953.  MD notified by Patient's RN.    Mari Tanner RN  Clinical House Supervisor  5/4/2022 10:05 EDT

## 2022-05-04 NOTE — PROGRESS NOTES
Continued Stay Note  [facility]     Patient Name: Antonette Alvarez  MRN: [id number]  Today's Date: 5/4/2022    Admit Date: 5/3/2022     Discharge Plan     Row Name 05/04/22 1009       Plan    Plan Inpatient hospice    Plan Comments Visit to bedside. Noted relaxed face, jaw, body posture, respirations.  Non verbal signs of comfort present. Mottled fingers, toes, center of knees. Hands, knees, feet are cool. These are changes since assessment yesterday.  Discussed assessment, condition, eol signs/symptoms, what to possibly expect from this point forward with the patient's children at the bedside. Rn offers support, open communication. Noted verbalization of understanding. This patient continues to meet inpatient criteria due to complex technical injectable medication delivery requiring frequent skilled nursing assessments, interventions, and reevaluations. Her current level of care is unable to be maintained in an alternate setting.    Final Discharge Disposition Code 51 - hospice medical facility               Discharge Codes    No documentation.                     Tamika Gonzalez RN

## 2022-05-09 NOTE — DISCHARGE SUMMARY
[facility] Medicine Services  DISCHARGE TO INPATIENT HOSPICE    Patient Name: Antonette Alvarez  : 1935  MRN: [id number]    Date of Admission: 2022  Date of Discharge:  5/3/21  Primary Care Physician: Ivan Lau MD    Consults     Date and Time Order Name Status Description    2022  9:44 AM Inpatient Palliative Care MD Consult Completed     2022  3:02 AM Inpatient Cardiology Consult Completed     2022 12:34 AM Inpatient Hematology & Oncology Consult Completed         Hospital Course     Presenting Problem:   Right heart failure (HCC) [I50.810]    Active Hospital Problems    Diagnosis  POA   • Atrial fibrillation with RVR (HCC) [I48.91]  Unknown   • Right heart failure (HCC) [I50.810]  Yes   • Anemia, chronic disease [D63.8]  Yes   • Acute respiratory failure with hypoxia (HCC) [J96.01]  Yes   • Pleural effusion on right [J90]  Yes   • Essential hypertension [I10]  Yes   • Hyperlipemia [E78.5]  Yes   • Pulmonary embolism (HCC) [I26.99]  Yes   • Primary cancer of right middle lobe of lung (HCC) [C34.2]  Yes      Resolved Hospital Problems   No resolved problems to display.          Hospital Course:  Antonette Alvarez is a 86 y.o. female  history of multiple myeloma (s/p chemo now in remission), hypertension, hyperlipidemia, DVT/PE, known adenocarcinoma of the right lung, and hx of stroke (10 yrs ago).      Presented to [facility] ED for complaint of worsening lower extremity edema as well as worsening fatigue. Failed outpatient Lasix therapy.  -  Recently admitted to [facility] for bilat pulmonary emboli, as well as new onset Atrial Fib. Started on Eliquis.  - History of lung cancer, got Cyberknife therapy over 2 years ago. A year later, repeat Cyberknife for metastatic disease of the adrenal glands.  Most recent PET scan on 3/29/22 shows findings overall likely representing treatment response.  Opted for comfort measures and was transferred to  hospice     Day of Discharge     HPI:   Unable to obtain    ROS:  Unable to obtain    Vital Signs:         Physical Exam:  Constitutional: No acute distress, elderly female   HENT: NCAT, mucous membranes moist  Respiratory: respiratory effort normal   Cardiovascular: RRR,   Gastrointestinal: , nondistended  Musculoskeletal: No bilateral ankle edema  Psychiatric: Unable to assess   Neurologic: Opens eyes to  stimuli   Skin: No rashes      Discharge Details     Discharge Disposition:  Transfer care to inpatient Hospice at [facility]    Time Spent on Discharge:   15 minutes    Tiffani Hutchins DO  05/08/22

## 2022-05-10 NOTE — DISCHARGE SUMMARY
Date of Death: 2022  Time of Death:  0953    Presenting Problem/History of Present Illness    Recurrent non-small cell lung cancer (NSCLC) (HCC)      Per Hospitalist Discharge Summary:     Hospital Course:  Antonette Alvarez is a 86 y.o. female  history of multiple myeloma (s/p chemo now in remission), hypertension, hyperlipidemia, DVT/PE, known adenocarcinoma of the right lung, and hx of stroke (10 yrs ago).      Presented to [facility] ED for complaint of worsening lower extremity edema as well as worsening fatigue. Failed outpatient Lasix therapy.  -  Recently admitted to [facility] for bilat pulmonary emboli, as well as new onset Atrial Fib. Started on Eliquis.  - History of lung cancer, got Cyberknife therapy over 2 years ago. A year later, repeat Cyberknife for metastatic disease of the adrenal glands.  Most recent PET scan on 3/29/22 shows findings overall likely representing treatment response.  Opted for comfort measures and was transferred to hospice       Social History:  Social History     Tobacco Use   • Smoking status: Former Smoker     Quit date: 1980     Years since quittin.3   • Smokeless tobacco: Never Used   Substance Use Topics   • Alcohol use: No         Consults:   Consults     Date and Time Order Name Status Description    2022  9:44 AM Inpatient Palliative Care MD Consult Completed     2022  3:02 AM Inpatient Cardiology Consult Completed     2022 12:34 AM Inpatient Hematology & Oncology Consult Completed         Exam confirms with auscultation zero audible heart tones and zero audible respirations. Ms.Violet GILDARDO Alvarez was pronounced dead at 0953.  MD notified by Patient's RN.     Mari Tanner RN  Clinical House Supervisor  2022 10:05 EDT      Dianelys Short, JESUS, MHA, APRN  [facility]ators  Hospice and Palliative Care Nurse Practitioner  05/10/22  13:52 EDT

## 2022-05-23 ENCOUNTER — APPOINTMENT (OUTPATIENT)
Dept: PET IMAGING | Facility: HOSPITAL | Age: 87
End: 2022-05-23